# Patient Record
Sex: MALE | Race: WHITE | NOT HISPANIC OR LATINO | Employment: FULL TIME | URBAN - METROPOLITAN AREA
[De-identification: names, ages, dates, MRNs, and addresses within clinical notes are randomized per-mention and may not be internally consistent; named-entity substitution may affect disease eponyms.]

---

## 2017-01-10 ENCOUNTER — OFFICE VISIT (OUTPATIENT)
Dept: URGENT CARE | Facility: CLINIC | Age: 52
End: 2017-01-10
Payer: COMMERCIAL

## 2017-01-10 VITALS
WEIGHT: 137.44 LBS | DIASTOLIC BLOOD PRESSURE: 67 MMHG | HEART RATE: 89 BPM | TEMPERATURE: 98 F | SYSTOLIC BLOOD PRESSURE: 104 MMHG | HEIGHT: 66 IN | BODY MASS INDEX: 22.09 KG/M2

## 2017-01-10 DIAGNOSIS — J06.9 UPPER RESPIRATORY TRACT INFECTION, UNSPECIFIED TYPE: Primary | ICD-10-CM

## 2017-01-10 PROCEDURE — 99999 PR PBB SHADOW E&M-EST. PATIENT-LVL III: CPT | Mod: PBBFAC,,, | Performed by: NURSE PRACTITIONER

## 2017-01-10 PROCEDURE — 96372 THER/PROPH/DIAG INJ SC/IM: CPT | Mod: S$GLB,,, | Performed by: NURSE PRACTITIONER

## 2017-01-10 PROCEDURE — 99213 OFFICE O/P EST LOW 20 MIN: CPT | Mod: 25,S$GLB,, | Performed by: NURSE PRACTITIONER

## 2017-01-10 PROCEDURE — 3074F SYST BP LT 130 MM HG: CPT | Mod: S$GLB,,, | Performed by: NURSE PRACTITIONER

## 2017-01-10 PROCEDURE — 3078F DIAST BP <80 MM HG: CPT | Mod: S$GLB,,, | Performed by: NURSE PRACTITIONER

## 2017-01-10 PROCEDURE — 1159F MED LIST DOCD IN RCRD: CPT | Mod: S$GLB,,, | Performed by: NURSE PRACTITIONER

## 2017-01-10 RX ORDER — HYDROCODONE BITARTRATE AND ACETAMINOPHEN 10; 325 MG/1; MG/1
1 TABLET ORAL
COMMUNITY
End: 2021-12-29

## 2017-01-10 RX ORDER — AZITHROMYCIN 250 MG/1
TABLET, FILM COATED ORAL
Qty: 6 TABLET | Refills: 0 | Status: SHIPPED | OUTPATIENT
Start: 2017-01-10 | End: 2017-01-15

## 2017-01-10 RX ORDER — DEXAMETHASONE SODIUM PHOSPHATE 4 MG/ML
8 INJECTION, SOLUTION INTRA-ARTICULAR; INTRALESIONAL; INTRAMUSCULAR; INTRAVENOUS; SOFT TISSUE ONCE
Status: COMPLETED | OUTPATIENT
Start: 2017-01-10 | End: 2017-01-10

## 2017-01-10 RX ADMIN — DEXAMETHASONE SODIUM PHOSPHATE 8 MG: 4 INJECTION, SOLUTION INTRA-ARTICULAR; INTRALESIONAL; INTRAMUSCULAR; INTRAVENOUS; SOFT TISSUE at 12:01

## 2017-01-10 NOTE — MR AVS SNAPSHOT
[facility]  [address] [facility] [address]  Phone: [phone]  Fax: [phone]                  Hector Rao   1/10/2017 12:00 PM   Office Visit    Description:  Male : 1965   Provider:  Larissa Pope NP   Department:  [facility]           Reason for Visit     URI           Diagnoses this Visit        Comments    Upper respiratory tract infection, unspecified type    -  Primary            To Do List           Goals (5 Years of Data)     None      Follow-Up and Disposition     Return if symptoms worsen or fail to improve.       These Medications        Disp Refills Start End    azithromycin (Z-ANETTE) 250 MG tablet 6 tablet 0 1/10/2017 1/15/2017    Take 2 tablets by mouth on day 1; Take 1 tablet by mouth on days 2-5    [place] [facility] [place] [address] Ph #: [phone]         [facility]     [facility] On Call Nurse [facility] Line -  Assistance  Registered nurses in the [facility] provide clinical advisement, health education, appointment booking, and other advisory services.  Call for this free service at [phone].             Medications           Message regarding Medications     Verify the changes and/or additions to your medication regime listed below are the same as discussed with your clinician today.  If any of these changes or additions are incorrect, please notify your healthcare provider.        START taking these NEW medications        Refills    azithromycin (Z-ANETTE) 250 MG tablet 0    Sig: Take 2 tablets by mouth on day 1; Take 1 tablet by mouth on days 2-5    Class: Normal      These medications were administered today        Dose Freq    dexamethasone injection 8 mg 8 mg Once    Sig: Inject 2 mLs (8 mg total) into the muscle once.    Class: Normal    Route: Intramuscular      STOP taking these medications     budesonide-formoterol 160-4.5 mcg (SYMBICORT) 160-4.5 mcg/actuation HFAA  "Inhale 2 puffs into the lungs every 12 (twelve) hours.    predniSONE (DELTASONE) 5 MG tablet Take 8 po q day x 3 days then 4 po q day x 3 days then 2 po q day x 3 days then 1 po q day x 3 days then stop.           Verify that the below list of medications is an accurate representation of the medications you are currently taking.  If none reported, the list may be blank. If incorrect, please contact your healthcare provider. Carry this list with you in case of emergency.           Current Medications     albuterol 90 mcg/actuation inhaler Inhale 2 puffs into the lungs every 6 (six) hours as needed for Wheezing.    benazepril (LOTENSIN) 10 MG tablet Take 1 tablet (10 mg total) by mouth once daily.    clonazePAM (KLONOPIN) 0.5 MG tablet TAKE 1 TABLET BY MOUTH THREE TIMES A DAY IF NEEDED ANXIETY **MAY CAUSE DROWSINESS**    hydrocodone-acetaminophen 10-325mg (NORCO)  mg Tab Take 1 tablet by mouth.    azithromycin (Z-ANETTE) 250 MG tablet Take 2 tablets by mouth on day 1; Take 1 tablet by mouth on days 2-5           Clinical Reference Information           Vital Signs - Last Recorded  Most recent update: 1/10/2017 12:12 PM by Bhavna Lynch LPN    BP Pulse Temp Ht Wt BMI    104/67 89 98.3 °F (36.8 °C) (Oral) 5' 6" (1.676 m) 62.4 kg (137 lb 7.3 oz) 22.19 kg/m2      Blood Pressure          Most Recent Value    BP  104/67      Allergies as of 1/10/2017     Codeine    Lexapro [Escitalopram Oxalate]      Immunizations Administered on Date of Encounter - 1/10/2017     None      Smoking Cessation     If you would like to quit smoking:   You may be eligible for free services if you are a [place] resident and started smoking cigarettes before September 1, 1988.  Call the Smoking Cessation Trust ([employer]) toll free at ([phone] or ([phone].   Call 4-800-QUIT-NOW if you do not meet the above criteria.            "

## 2017-01-10 NOTE — PROGRESS NOTES
"CC:   Chief Complaint   Patient presents with    URI     HPI: This is a new problem.   Hector Rao is a 51 y.o. male with a complaint of URI.  The current episode started in the past 4 days.   The problem has been gradually worsening.   Associated symptoms included fever, chills, sore throat, cough, dyspnea, wheezing, headache.    Pertinent negatives include night sweats, nasal congestion, rhinorrhea, facial pain, swollen glands, chest pain, hemoptysis   Treatments tried: mucinex, sudafed, tylenol has been used and this has provided no relief.     The current allergy list that we have since it was last reconciled is as follows:  Review of patient's allergies indicates:   Allergen Reactions    Codeine      Other reaction(s): Nausea    Lexapro [escitalopram oxalate]      Erectile dysfunction.     Outpatient Medications Prior to Visit   Medication Sig Dispense Refill    albuterol 90 mcg/actuation inhaler Inhale 2 puffs into the lungs every 6 (six) hours as needed for Wheezing. 3 Inhaler 3    benazepril (LOTENSIN) 10 MG tablet Take 1 tablet (10 mg total) by mouth once daily. 90 tablet 3    clonazePAM (KLONOPIN) 0.5 MG tablet TAKE 1 TABLET BY MOUTH THREE TIMES A DAY IF NEEDED ANXIETY **MAY CAUSE DROWSINESS** 270 tablet 1    budesonide-formoterol 160-4.5 mcg (SYMBICORT) 160-4.5 mcg/actuation HFAA Inhale 2 puffs into the lungs every 12 (twelve) hours. 3 Inhaler 3    predniSONE (DELTASONE) 5 MG tablet Take 8 po q day x 3 days then 4 po q day x 3 days then 2 po q day x 3 days then 1 po q day x 3 days then stop. 45 tablet 0     No facility-administered medications prior to visit.         Physical Exam     Visit Vitals    /67    Pulse 89    Temp 98.3 °F (36.8 °C) (Oral)    Ht 5' 6" (1.676 m)    Wt 62.4 kg (137 lb 7.3 oz)    BMI 22.19 kg/m2     Physical Exam   Constitutional: He is oriented to person, place, and time. Vital signs are normal. He appears well-developed and well-nourished.   HENT:   Head: " Normocephalic and atraumatic.   Right Ear: Tympanic membrane and ear canal normal.   Left Ear: Tympanic membrane and ear canal normal.   Nose: Mucosal edema and rhinorrhea present.   Mouth/Throat: Uvula is midline and mucous membranes are normal. Posterior oropharyngeal erythema present. No oropharyngeal exudate.   Eyes: EOM are normal. Pupils are equal, round, and reactive to light.   Neck: Neck supple.   Cardiovascular: Normal rate, regular rhythm and normal heart sounds.    Pulmonary/Chest: Effort normal. He has no decreased breath sounds. He has wheezes in the right middle field, the right lower field, the left middle field and the left lower field. He has no rhonchi. He has no rales.   Musculoskeletal: Normal range of motion.   Neurological: He is alert and oriented to person, place, and time.   Skin: Skin is warm and dry.   Psychiatric: He has a normal mood and affect. His behavior is normal. Judgment and thought content normal.   Nursing note and vitals reviewed.      Encounter Diagnosis   Name Primary?    Upper respiratory tract infection, unspecified type Yes       PLAN:    Hector was seen today for uri.    Diagnoses and all orders for this visit:    Upper respiratory tract infection, unspecified type  -     dexamethasone injection 8 mg; Inject 2 mLs (8 mg total) into the muscle once.  -     azithromycin (Z-ANETTE) 250 MG tablet; Take 2 tablets by mouth on day 1; Take 1 tablet by mouth on days 2-5      Medications Ordered This Encounter      azithromycin (Z-ANETTE) 250 MG tablet          Sig: Take 2 tablets by mouth on day 1; Take 1 tablet by mouth on days 2-5          Dispense:  6 tablet          Refill:  0      dexamethasone injection 8 mg  No orders of the defined types were placed in this encounter.    RTC if symptoms are worsening or changing significantly or if not improved by the end of therapy.

## 2017-01-30 DIAGNOSIS — I10 ESSENTIAL HYPERTENSION: ICD-10-CM

## 2017-01-30 RX ORDER — BENAZEPRIL HYDROCHLORIDE 10 MG/1
TABLET ORAL
Qty: 90 TABLET | Refills: 3 | Status: SHIPPED | OUTPATIENT
Start: 2017-01-30 | End: 2017-04-05 | Stop reason: SDUPTHER

## 2017-02-14 DIAGNOSIS — F41.9 ANXIETY: ICD-10-CM

## 2017-02-14 RX ORDER — CLONAZEPAM 0.5 MG/1
TABLET ORAL
Qty: 90 TABLET | Refills: 0 | Status: SHIPPED | OUTPATIENT
Start: 2017-02-14 | End: 2017-04-05 | Stop reason: SDUPTHER

## 2017-02-14 NOTE — TELEPHONE ENCOUNTER
I have refilled the patient's requested medication x 1 month.  However, the patient is due for a face to face visit for narcotic refill. Call the patient on the phone and book the patient with EITHER ME OR MELVIN LUIS NP for a visit.    PLEASE DOCUMENT THE FACT THAT YOU HAVE CONTACTED THE PATIENT IN THE CHART FOR FUTURE REFERENCE.    Health Maintenance Due   Topic Date Due    Hepatitis C Screening  1965    Pneumococcal PPSV23 (Medium Risk) (1) 07/05/1981    Influenza Vaccine  08/01/2016

## 2017-03-23 ENCOUNTER — PATIENT OUTREACH (OUTPATIENT)
Dept: ADMINISTRATIVE | Facility: HOSPITAL | Age: 52
End: 2017-03-23

## 2017-03-23 DIAGNOSIS — I10 ESSENTIAL HYPERTENSION: Primary | ICD-10-CM

## 2017-04-05 ENCOUNTER — OFFICE VISIT (OUTPATIENT)
Dept: FAMILY MEDICINE | Facility: CLINIC | Age: 52
End: 2017-04-05
Payer: COMMERCIAL

## 2017-04-05 VITALS
DIASTOLIC BLOOD PRESSURE: 85 MMHG | HEIGHT: 66 IN | HEART RATE: 83 BPM | WEIGHT: 139.31 LBS | TEMPERATURE: 98 F | BODY MASS INDEX: 22.39 KG/M2 | SYSTOLIC BLOOD PRESSURE: 137 MMHG

## 2017-04-05 DIAGNOSIS — I10 ESSENTIAL HYPERTENSION: Primary | ICD-10-CM

## 2017-04-05 DIAGNOSIS — Z11.59 NEED FOR HEPATITIS C SCREENING TEST: ICD-10-CM

## 2017-04-05 DIAGNOSIS — J45.20 INTERMITTENT ASTHMA, UNCOMPLICATED: ICD-10-CM

## 2017-04-05 DIAGNOSIS — Z72.0 NICOTINE ABUSE: ICD-10-CM

## 2017-04-05 DIAGNOSIS — S03.00XA TMJ (DISLOCATION OF TEMPOROMANDIBULAR JOINT), INITIAL ENCOUNTER: ICD-10-CM

## 2017-04-05 DIAGNOSIS — F41.9 ANXIETY: ICD-10-CM

## 2017-04-05 DIAGNOSIS — Z12.5 PROSTATE CANCER SCREENING: ICD-10-CM

## 2017-04-05 PROCEDURE — 3075F SYST BP GE 130 - 139MM HG: CPT | Mod: S$GLB,,, | Performed by: FAMILY MEDICINE

## 2017-04-05 PROCEDURE — 3079F DIAST BP 80-89 MM HG: CPT | Mod: S$GLB,,, | Performed by: FAMILY MEDICINE

## 2017-04-05 PROCEDURE — 90471 IMMUNIZATION ADMIN: CPT | Mod: S$GLB,,, | Performed by: FAMILY MEDICINE

## 2017-04-05 PROCEDURE — 99214 OFFICE O/P EST MOD 30 MIN: CPT | Mod: S$GLB,,, | Performed by: FAMILY MEDICINE

## 2017-04-05 PROCEDURE — 1160F RVW MEDS BY RX/DR IN RCRD: CPT | Mod: S$GLB,,, | Performed by: FAMILY MEDICINE

## 2017-04-05 PROCEDURE — 90732 PPSV23 VACC 2 YRS+ SUBQ/IM: CPT | Mod: S$GLB,,, | Performed by: FAMILY MEDICINE

## 2017-04-05 PROCEDURE — 99999 PR PBB SHADOW E&M-EST. PATIENT-LVL III: CPT | Mod: PBBFAC,,, | Performed by: FAMILY MEDICINE

## 2017-04-05 RX ORDER — ALBUTEROL SULFATE 90 UG/1
2 AEROSOL, METERED RESPIRATORY (INHALATION) EVERY 6 HOURS PRN
Qty: 3 INHALER | Refills: 3 | Status: SHIPPED | OUTPATIENT
Start: 2017-04-05 | End: 2018-07-10 | Stop reason: SDUPTHER

## 2017-04-05 RX ORDER — CLONAZEPAM 0.5 MG/1
TABLET ORAL
Qty: 90 TABLET | Refills: 5 | Status: SHIPPED | OUTPATIENT
Start: 2017-04-05 | End: 2017-11-25 | Stop reason: SDUPTHER

## 2017-04-05 RX ORDER — BENAZEPRIL HYDROCHLORIDE 10 MG/1
10 TABLET ORAL DAILY
Qty: 90 TABLET | Refills: 3 | Status: SHIPPED | OUTPATIENT
Start: 2017-04-05 | End: 2017-07-31 | Stop reason: SDUPTHER

## 2017-04-05 NOTE — PROGRESS NOTES
"Subjective:      Patient ID: Hector Rao is a 51 y.o. male.    Chief Complaint: Annual Exam    HPI       He has had an ENT evaluate him for his TMJ and he has had a dentist that has been doing various reconstruction on his teeth.  He has had some continued ear pain on the left and he has had chiropractic treatment.  He has a snapping of his left ear when he opens and closes his jaw.  He can feel it with his finger.  He states that the chiropractor has referred him to a TMJ specialist.  It is only on the left ear that this is occurring.       The patient presents with essential hypertension.  The patient is tolerating the medication well and is in excellent compliance.  The patient is experiencing no side effects.  Counseling was offered regarding low salt diets.  The patient has a reduced salt intake.  The patient denies chest pain, palpitations, shortness of breath, dyspnea on exertion, left or murmur neck pain, nausea, vomiting, diaphoresis, paroxysmal nocturnal dyspnea, and orthopnea.   /85 (BP Location: Right arm, Patient Position: Sitting, BP Method: Automatic)  Pulse 83  Temp 97.6 °F (36.4 °C) (Oral)   Ht 5' 6" (1.676 m)  Wt 63.2 kg (139 lb 5.3 oz)  BMI 22.49 kg/m2    He has nicotine abuse and he does not want to quit this at this time.      Has has asthma and he is on albuterol for this intermittetnly.      He has anxiety and he is on chronic klonopin for this at this time and it is helping him stay calm.  He has not had problems with the medicine.     He has chronic pain and he is in with chronic pain management.     Health Maintenance Due   Topic Date Due    Hepatitis C Screening  1965    Pneumococcal PPSV23 (Medium Risk) (1) 07/05/1981    Influenza Vaccine  08/01/2016       Past Medical History:  Past Medical History:   Diagnosis Date    Anxiety     Asthma, acute     Joint pain     TMJ (dislocation of temporomandibular joint)      Past Surgical History:   Procedure Laterality Date "    KNEE SURGERY      left side     Review of patient's allergies indicates:   Allergen Reactions    Codeine      Other reaction(s): Nausea    Lexapro [escitalopram oxalate]      Erectile dysfunction.     Current Outpatient Prescriptions on File Prior to Visit   Medication Sig Dispense Refill    albuterol 90 mcg/actuation inhaler Inhale 2 puffs into the lungs every 6 (six) hours as needed for Wheezing. 3 Inhaler 3    benazepril (LOTENSIN) 10 MG tablet TAKE 1 TABLET BY MOUTH EVERY DAY 90 tablet 3    clonazePAM (KLONOPIN) 0.5 MG tablet TAKE 1 TABLET BY MOUTH THREE TIMES A DAY IF NEEDED ANXIETY **MAY CAUSE DROWSINESS** 90 tablet 0    hydrocodone-acetaminophen 10-325mg (NORCO)  mg Tab Take 1 tablet by mouth.       No current facility-administered medications on file prior to visit.      Social History     Social History    Marital status:      Spouse name: Myrtle    Number of children: 2    Years of education: N/A     Occupational History    [employer]      [employer]     Social History Main Topics    Smoking status: Current Every Day Smoker     Packs/day: 2.00     Years: 36.00     Types: Cigarettes    Smokeless tobacco: Never Used    Alcohol use 1.2 oz/week     2 Cans of beer per week      Comment: He is a reformed alcoholic/ 2 beers per week    Drug use: Yes      Comment: He used to use marijuana.    Sexual activity: Yes     Partners: Female     Other Topics Concern    Not on file     Social History Narrative    No narrative on file     Family History   Problem Relation Age of Onset    Stroke Mother     Hypertension Mother     Stroke Father     Hypertension Father     Stroke       grandmother/grandfather             Review of Systems   Constitutional: Negative.  Negative for chills, diaphoresis and fever.   HENT: Positive for dental problem. Negative for congestion, hearing loss, mouth sores, postnasal drip and sore throat.    Eyes: Negative for pain  "and visual disturbance.   Respiratory: Negative for cough, chest tightness, shortness of breath and wheezing.    Cardiovascular: Negative for chest pain.   Gastrointestinal: Negative for abdominal pain, anal bleeding, blood in stool, constipation, diarrhea, nausea and vomiting.   Genitourinary: Negative for dysuria and hematuria.   Musculoskeletal: Negative for back pain, neck pain and neck stiffness.   Skin: Negative for rash.   Neurological: Negative for dizziness and weakness.       Objective:   /85 (BP Location: Right arm, Patient Position: Sitting, BP Method: Automatic)  Pulse 83  Temp 97.6 °F (36.4 °C) (Oral)   Ht 5' 6" (1.676 m)  Wt 63.2 kg (139 lb 5.3 oz)  BMI 22.49 kg/m2    Physical Exam   Constitutional: He is oriented to person, place, and time. He appears well-developed and well-nourished.   HENT:   Head: Normocephalic and atraumatic.       Right Ear: External ear normal.   Left Ear: External ear normal.   Nose: Nose normal.   Mouth/Throat: Oropharynx is clear and moist. No oropharyngeal exudate.   There is a clicking noted on the left TMJ.   Eyes: Conjunctivae and EOM are normal. Pupils are equal, round, and reactive to light. Right eye exhibits no discharge. Left eye exhibits no discharge. No scleral icterus.   Neck: Normal range of motion. Neck supple. No JVD present. No thyromegaly present.   Cardiovascular: Normal rate, regular rhythm, normal heart sounds and intact distal pulses.  Exam reveals no gallop and no friction rub.    No murmur heard.  Pulmonary/Chest: Effort normal and breath sounds normal. No respiratory distress. He has no wheezes. He has no rales. He exhibits no tenderness.   Abdominal: Soft. Bowel sounds are normal. He exhibits no distension and no mass. There is no tenderness. There is no rebound and no guarding.   Genitourinary:   Genitourinary Comments: The patient was offered to allow me to check the prostate today but the patient declined to have it examined.  "   Musculoskeletal: Normal range of motion. He exhibits no edema or tenderness.   Lymphadenopathy:     He has no cervical adenopathy.   Neurological: He is alert and oriented to person, place, and time. No cranial nerve deficit. Coordination normal.   Skin: Skin is warm and dry. He is not diaphoretic.   Psychiatric: He has a normal mood and affect.       Assessment:     1. Essential hypertension    2. Anxiety    3. Nicotine abuse    4. TMJ (dislocation of temporomandibular joint), initial encounter    5. Need for hepatitis C screening test    6. Prostate cancer screening    7. Intermittent asthma, uncomplicated        Plan:   Hector was seen today for annual exam.    Diagnoses and all orders for this visit:    Essential hypertension  -     benazepril (LOTENSIN) 10 MG tablet; Take 1 tablet (10 mg total) by mouth once daily.  -     Comprehensive metabolic panel; Future  -     Lipid panel; Future    Anxiety  -     clonazePAM (KLONOPIN) 0.5 MG tablet; TAKE 1 TABLET BY MOUTH THREE TIMES A DAY IF NEEDED ANXIETY **MAY CAUSE DROWSINESS**    Nicotine abuse  -     Ambulatory referral to Smoking Cessation Program    TMJ (dislocation of temporomandibular joint), initial encounter    Need for hepatitis C screening test  -     Hepatitis C antibody; Future    Prostate cancer screening  -     PSA, Screening; Future    Intermittent asthma, uncomplicated  -     albuterol 90 mcg/actuation inhaler; Inhale 2 puffs into the lungs every 6 (six) hours as needed for Wheezing.    Other orders  -     Pneumococcal Polysaccharide Vaccine (23 Valent) (SQ/IM)        He needs to see a TMJ specialist who he has already secured the name of and is planning on seeing.

## 2017-04-05 NOTE — MR AVS SNAPSHOT
[facility]  [address] [facility] [address]  Phone: [phone]  Fax: [phone]                  Hector TAYLOR Jalen   2017 10:00 AM   Office Visit    Description:  Male : 1965   Provider:  Jerome Arnold MD   Department:  [facility]           Reason for Visit     Annual Exam           Diagnoses this Visit        Comments    Essential hypertension    -  Primary     Anxiety         Nicotine abuse         TMJ (dislocation of temporomandibular joint), initial encounter         Need for hepatitis C screening test         Prostate cancer screening         Intermittent asthma, uncomplicated                To Do List           Future Appointments        Provider Department Dept Phone    2017 1:45 PM LABORATORY, [place] [facility] [phone]      Goals (5 Years of Data)     None       These Medications        Disp Refills Start End    clonazePAM (KLONOPIN) 0.5 MG tablet 90 tablet 5 2017     TAKE 1 TABLET BY MOUTH THREE TIMES A DAY IF NEEDED ANXIETY **MAY CAUSE DROWSINESS**    Pharmacy: [address] Ph #: [phone]       Notes to Pharmacy: Generic For:KLONOPIN 0.5 MG TABLET    benazepril (LOTENSIN) 10 MG tablet 90 tablet 3 2017     Take 1 tablet (10 mg total) by mouth once daily. - Oral    Pharmacy: [address] Ph #: [phone]       Notes to Pharmacy: Generic For:LOTENSIN 10 MG TABLET  2017 4:56:11 PM  N O T I C E    Last quantity doesn't match original quantity    albuterol 90 mcg/actuation inhaler 3 Inhaler 3 2017     Inhale 2 puffs into the lungs every 6 (six) hours as needed for Wheezing. - Inhalation    Pharmacy: [address] Ph #: [phone]         [facility]     [facility] Nurse Care Line - 24/7 Assistance  Unless otherwise directed by your provider,  "please contact [facility], our nurse care line that is available for 24/7 assistance.     Registered nurses in the [facility] provide: appointment scheduling, clinical advisement, health education, and other advisory services.  Call: [phone] (toll free)               Medications           Message regarding Medications     Verify the changes and/or additions to your medication regime listed below are the same as discussed with your clinician today.  If any of these changes or additions are incorrect, please notify your healthcare provider.        CHANGE how you are taking these medications     Start Taking Instead of    benazepril (LOTENSIN) 10 MG tablet benazepril (LOTENSIN) 10 MG tablet    Dosage:  Take 1 tablet (10 mg total) by mouth once daily. Dosage:  TAKE 1 TABLET BY MOUTH EVERY DAY    Reason for Change:  Reorder            Verify that the below list of medications is an accurate representation of the medications you are currently taking.  If none reported, the list may be blank. If incorrect, please contact your healthcare provider. Carry this list with you in case of emergency.           Current Medications     albuterol 90 mcg/actuation inhaler Inhale 2 puffs into the lungs every 6 (six) hours as needed for Wheezing.    benazepril (LOTENSIN) 10 MG tablet Take 1 tablet (10 mg total) by mouth once daily.    clonazePAM (KLONOPIN) 0.5 MG tablet TAKE 1 TABLET BY MOUTH THREE TIMES A DAY IF NEEDED ANXIETY **MAY CAUSE DROWSINESS**    hydrocodone-acetaminophen 10-325mg (NORCO)  mg Tab Take 1 tablet by mouth.           Clinical Reference Information           Your Vitals Were     BP Pulse Temp Height Weight BMI    137/85 (BP Location: Right arm, Patient Position: Sitting, BP Method: Automatic) 83 97.6 °F (36.4 °C) (Oral) 5' 6" (1.676 m) 63.2 kg (139 lb 5.3 oz) 22.49 kg/m2      Blood Pressure          Most Recent Value    BP  137/85      Allergies as of 4/5/2017     Codeine    Lexapro " [Escitalopram Oxalate]      Immunizations Administered on Date of Encounter - 4/5/2017     Name Date Dose VIS Date Route    Pneumococcal Polysaccharide - 23 Valent  Incomplete 0.5 mL 4/24/2015 Intramuscular      Orders Placed During Today's Visit      Normal Orders This Visit    Ambulatory referral to Smoking Cessation Program     Pneumococcal Polysaccharide Vaccine (23 Valent) (SQ/IM)     Future Labs/Procedures Expected by Expires    Comprehensive metabolic panel  4/5/2017 (Approximate) 4/5/2018    Hepatitis C antibody  4/5/2017 (Approximate) 4/5/2018    Lipid panel  4/5/2017 (Approximate) 4/5/2018    PSA, Screening  4/5/2017 4/6/2018      Smoking Cessation     If you would like to quit smoking:   You may be eligible for free services if you are a [place] resident and started smoking cigarettes before September 1, 1988.  Call the Smoking Cessation Trust ([facility]) toll free at ([phone]) [phone] or [phone]) [phone].   Call 1-800-QUIT-NOW if you do not meet the above criteria.   Contact us via email: [email or web address]   View our website for more information: [email or web address]        Language Assistance Services     ATTENTION: Language assistance services are available, free of charge. Please call [phone].      ATENCIÓN: Si habla español, tiene a diallo disposición servicios gratuitos de asistencia lingüística. Llame al [phone].     CHÚ Ý: N?u b?n nói Ti?ng Vi?t, có các d?ch v? h? tr? ngôn ng? mi?n phí dành cho b?n. G?i s? [phone].         [facility] complies with applicable Federal civil rights laws and does not discriminate on the basis of race, color, national origin, age, disability, or sex.

## 2017-04-07 ENCOUNTER — LAB VISIT (OUTPATIENT)
Dept: LAB | Facility: HOSPITAL | Age: 52
End: 2017-04-07
Attending: FAMILY MEDICINE
Payer: COMMERCIAL

## 2017-04-07 DIAGNOSIS — I10 ESSENTIAL HYPERTENSION: ICD-10-CM

## 2017-04-07 DIAGNOSIS — Z11.59 NEED FOR HEPATITIS C SCREENING TEST: ICD-10-CM

## 2017-04-07 DIAGNOSIS — Z12.5 PROSTATE CANCER SCREENING: ICD-10-CM

## 2017-04-07 LAB
ALBUMIN SERPL BCP-MCNC: 3.9 G/DL
ALP SERPL-CCNC: 54 U/L
ALT SERPL W/O P-5'-P-CCNC: 18 U/L
ANION GAP SERPL CALC-SCNC: 10 MMOL/L
AST SERPL-CCNC: 22 U/L
BILIRUB SERPL-MCNC: 0.4 MG/DL
BUN SERPL-MCNC: 19 MG/DL
CALCIUM SERPL-MCNC: 9.3 MG/DL
CHLORIDE SERPL-SCNC: 106 MMOL/L
CHOLEST/HDLC SERPL: 3.3 {RATIO}
CO2 SERPL-SCNC: 22 MMOL/L
COMPLEXED PSA SERPL-MCNC: 0.39 NG/ML
CREAT SERPL-MCNC: 1 MG/DL
EST. GFR  (AFRICAN AMERICAN): >60 ML/MIN/1.73 M^2
EST. GFR  (NON AFRICAN AMERICAN): >60 ML/MIN/1.73 M^2
GLUCOSE SERPL-MCNC: 84 MG/DL
HDL/CHOLESTEROL RATIO: 30.5 %
HDLC SERPL-MCNC: 154 MG/DL
HDLC SERPL-MCNC: 47 MG/DL
LDLC SERPL CALC-MCNC: 94.4 MG/DL
NONHDLC SERPL-MCNC: 107 MG/DL
POTASSIUM SERPL-SCNC: 4 MMOL/L
PROT SERPL-MCNC: 6.7 G/DL
SODIUM SERPL-SCNC: 138 MMOL/L
TRIGL SERPL-MCNC: 63 MG/DL

## 2017-04-07 PROCEDURE — 80053 COMPREHEN METABOLIC PANEL: CPT

## 2017-04-07 PROCEDURE — 84153 ASSAY OF PSA TOTAL: CPT

## 2017-04-07 PROCEDURE — 36415 COLL VENOUS BLD VENIPUNCTURE: CPT | Mod: PO

## 2017-04-07 PROCEDURE — 86803 HEPATITIS C AB TEST: CPT

## 2017-04-07 PROCEDURE — 80061 LIPID PANEL: CPT

## 2017-04-10 LAB — HCV AB SERPL QL IA: NEGATIVE

## 2017-04-10 NOTE — PROGRESS NOTES
The patient's lipid and liver are at a controlled target on the labs that I reviewed.   Repeat a LIPID AND LIVER PROFILE in 12 months.  Refill the lipid medications for a year for the patient.    The patient has a normal PSA so recheck that in 1 year.     The electrolytes are good.  Recheck those in 1 year.

## 2017-07-31 ENCOUNTER — OFFICE VISIT (OUTPATIENT)
Dept: FAMILY MEDICINE | Facility: CLINIC | Age: 52
End: 2017-07-31
Payer: COMMERCIAL

## 2017-07-31 VITALS
WEIGHT: 137.38 LBS | DIASTOLIC BLOOD PRESSURE: 64 MMHG | HEIGHT: 66 IN | SYSTOLIC BLOOD PRESSURE: 101 MMHG | HEART RATE: 75 BPM | BODY MASS INDEX: 22.08 KG/M2 | TEMPERATURE: 98 F

## 2017-07-31 DIAGNOSIS — Z72.0 NICOTINE ABUSE: ICD-10-CM

## 2017-07-31 DIAGNOSIS — E78.5 HYPERLIPIDEMIA, UNSPECIFIED HYPERLIPIDEMIA TYPE: ICD-10-CM

## 2017-07-31 DIAGNOSIS — I10 ESSENTIAL HYPERTENSION: ICD-10-CM

## 2017-07-31 DIAGNOSIS — G45.9 TRANSIENT CEREBRAL ISCHEMIA, UNSPECIFIED TYPE: Primary | ICD-10-CM

## 2017-07-31 PROCEDURE — 99214 OFFICE O/P EST MOD 30 MIN: CPT | Mod: S$GLB,,, | Performed by: FAMILY MEDICINE

## 2017-07-31 PROCEDURE — 99999 PR PBB SHADOW E&M-EST. PATIENT-LVL III: CPT | Mod: PBBFAC,,, | Performed by: FAMILY MEDICINE

## 2017-07-31 RX ORDER — IBUPROFEN 200 MG
1 TABLET ORAL
COMMUNITY
Start: 2017-07-23 | End: 2017-08-06

## 2017-07-31 RX ORDER — ATORVASTATIN CALCIUM 20 MG/1
20 TABLET, FILM COATED ORAL DAILY
Qty: 30 TABLET | Refills: 11 | Status: SHIPPED | OUTPATIENT
Start: 2017-07-31 | End: 2018-07-17 | Stop reason: SDUPTHER

## 2017-07-31 RX ORDER — ASPIRIN 81 MG/1
81 TABLET ORAL
COMMUNITY
Start: 2017-07-23 | End: 2020-10-30 | Stop reason: SDUPTHER

## 2017-07-31 RX ORDER — OXYMETAZOLINE HCL 0.05 %
2 SPRAY, NON-AEROSOL (ML) NASAL
COMMUNITY
End: 2018-12-17

## 2017-07-31 RX ORDER — ACETAMINOPHEN 325 MG/1
650 TABLET ORAL
COMMUNITY
Start: 2017-07-23 | End: 2017-08-02

## 2017-07-31 RX ORDER — MULTIVITAMIN
1 TABLET ORAL
COMMUNITY
End: 2018-04-09

## 2017-07-31 RX ORDER — BENAZEPRIL HYDROCHLORIDE 10 MG/1
10 TABLET ORAL DAILY
Qty: 90 TABLET | Refills: 3 | Status: SHIPPED | OUTPATIENT
Start: 2017-07-31 | End: 2018-08-22 | Stop reason: SDUPTHER

## 2017-07-31 RX ORDER — ATORVASTATIN CALCIUM 20 MG/1
20 TABLET, FILM COATED ORAL
COMMUNITY
Start: 2017-07-23 | End: 2017-07-31 | Stop reason: SDUPTHER

## 2017-07-31 NOTE — PROGRESS NOTES
Subjective:      Patient ID: Hector Rao is a 52 y.o. male.    Chief Complaint: Follow-up    HPI he is f/u on his TIA workup.  I have reviewed all of his labs and workup and it appears that they kept him on the statin, ACE inhibitor and the aspirin.    He has not had a recurrence of the symptoms at this time.  He had a GABBY and the ECHO portion was read today.    He has lost his job and he has had a continue problem with his left ear and the vertigo, etc that he has had from it and he has been seeing an ENT for this.     Facial asymmetry 07/23/2017   Last Assessment & Plan:     The patient was placed in observation for evaluation of possible TIA. By the time the patient was evaluated in the ER is reported neurologic deficits (specifically facial asymmetry reported by the wife) had resolved. The patient has baseline cervical spinal canal stenosis. An MRI of the cervical spine did not show anything acute, specifically no spinal cord compression, and nothing that warranted neurosurgical/or orthopedic spine consultation inpatient. His MRI of the cervical spine showed multilevel osteophytes and some congenital narrowing. Of note, his wife was stating that he had an episode of the left side of his face being abnormal in appearance. Possibly left-sided facial droop? The patient had a head CT done at [facility] emergency room that did not show anything acute. He was transferred here for observation status for stroke workup. He was placed on an aspirin and statin. Again, as mentioned above, by the time of evaluation in the ER he had not had any shoulder droop or asymmetry in the only neurologic deficit was his report of pain and some numbness down his left arm but no strength deficits upon exam. His neuro exam has remained stable/essentially normal (with the exception of the aforementioned chronic sensory complaints in the left arm). The patient had an MRI of his brain done that did not show any acute findings nor old  stroke. Carotid ultrasound showed minimal disease on the left at 20-39% stenosis and normal on the right at 0-19% stenosis. It is suspected the patient might have had a TIA and he is being treated as such given his risk factors of age, hypertension, and tobacco history. He has been counseled regarding stroke risk factors and counseled to quit smoking. TTE done and official/cardiology read pending. No hx of arrhythmia and patient has NSR on EKG and no reported events on tele. We will continue him on a statin and aspirin at discharge continue his ACE inhibitor for blood pressure control. Of note, his hemodynamics have been stable while inpatient. He is not a diabetic. He did receive Decadron in the ER when they were working up his cervical disc disease and that resulted in some transient hyperglycemia. Overall the patient's been requesting discharge stating he feels much better. Of counseled the patient regarding the fact that this could have been a TIA and that we are going to treat him as such and he voiced understanding. Overall the patient is stable for discharge.     Hypertension 07/23/2017   Last Assessment & Plan:     The patient was continued on his home medication of lisinopril given his neurologic deficits had resolved. He will be continued on his lisinopril at discharge. His hemodynamics have remained stable.     Tobacco abuse 07/23/2017   Last Assessment & Plan:     The patient's been counseled to quit and provided with a nicotine patch. He wants to self quit.     Cervical stenosis of spine 07/22/2017   Last Assessment & Plan:     The MRI of the patient's cervical spine showed chronic changes, specifically multilevel osteophytes with some mild congenital narrowing. No cord compression was present. The patient has had chronic neurologic complaints in his left arm in the form of sensory complaints and pain but no motor loss/abnormality. His complaints are not new. There is no indication for inpatient  neurosurgical nor orthopedic spinal consultation. I have informed the patient of these results and I have recommended that he follow-up with his PCP and get potentially referred to a neurosurgeon or spinal surgeon for continued monitoring.         There are no preventive care reminders to display for this patient.    Past Medical History:  Past Medical History:   Diagnosis Date    Anxiety     Asthma, acute     Joint pain     TIA (transient ischemic attack) 7/31/2017    TMJ (dislocation of temporomandibular joint)      Past Surgical History:   Procedure Laterality Date    KNEE SURGERY      left side     Review of patient's allergies indicates:   Allergen Reactions    Codeine      Other reaction(s): Nausea    Lexapro [escitalopram oxalate]      Erectile dysfunction.     Current Outpatient Prescriptions on File Prior to Visit   Medication Sig Dispense Refill    albuterol 90 mcg/actuation inhaler Inhale 2 puffs into the lungs every 6 (six) hours as needed for Wheezing. 3 Inhaler 3    benazepril (LOTENSIN) 10 MG tablet Take 1 tablet (10 mg total) by mouth once daily. 90 tablet 3    clonazePAM (KLONOPIN) 0.5 MG tablet TAKE 1 TABLET BY MOUTH THREE TIMES A DAY IF NEEDED ANXIETY **MAY CAUSE DROWSINESS** 90 tablet 5    hydrocodone-acetaminophen 10-325mg (NORCO)  mg Tab Take 1 tablet by mouth.       No current facility-administered medications on file prior to visit.      Social History     Social History    Marital status:      Spouse name: Myrtle    Number of children: 2    Years of education: N/A     Occupational History     [employer]      [employer]     Social History Main Topics    Smoking status: Current Every Day Smoker     Packs/day: 2.00     Years: 36.00     Types: Cigarettes    Smokeless tobacco: Never Used    Alcohol use 1.2 oz/week     2 Cans of beer per week      Comment: He is a reformed alcoholic/ 2 beers per week    Drug use:       Comment: He  "used to use marijuana.    Sexual activity: Yes     Partners: Female     Other Topics Concern    Not on file     Social History Narrative    No narrative on file     Family History   Problem Relation Age of Onset    Stroke Mother     Hypertension Mother     Stroke Father     Hypertension Father     Stroke       grandmother/grandfather           Review of Systems   Constitutional: Negative for fatigue.   Respiratory: Negative for cough and shortness of breath.    Cardiovascular: Negative for chest pain and palpitations.   Gastrointestinal: Negative for abdominal pain.   Genitourinary: Negative for dysuria and hematuria.       Objective:   /64   Pulse 75   Temp 98.3 °F (36.8 °C) (Oral)   Ht 5' 6" (1.676 m)   Wt 62.3 kg (137 lb 5.6 oz)   BMI 22.17 kg/m²     Physical Exam   Constitutional: He is oriented to person, place, and time. He appears well-developed and well-nourished.   HENT:   Head: Normocephalic and atraumatic.   Right Ear: External ear normal.   Left Ear: External ear normal.   Nose: Nose normal.   Mouth/Throat: Oropharynx is clear and moist. No oropharyngeal exudate.   Eyes: Conjunctivae and EOM are normal. Pupils are equal, round, and reactive to light. Right eye exhibits no discharge. Left eye exhibits no discharge. No scleral icterus.   Neck: Normal range of motion. Neck supple. No JVD present. No thyromegaly present.   Cardiovascular: Normal rate, regular rhythm, normal heart sounds and intact distal pulses.  Exam reveals no gallop and no friction rub.    No murmur heard.  Pulmonary/Chest: Effort normal and breath sounds normal. No respiratory distress. He has no wheezes. He has no rales. He exhibits no tenderness.   Abdominal: Soft. Bowel sounds are normal. He exhibits no distension and no mass. There is no tenderness. There is no rebound and no guarding.   Musculoskeletal: Normal range of motion. He exhibits no edema or tenderness.   Lymphadenopathy:     He has no cervical " adenopathy.   Neurological: He is alert and oriented to person, place, and time. No cranial nerve deficit. Coordination normal.   Skin: Skin is warm and dry. He is not diaphoretic.   Psychiatric: He has a normal mood and affect.       Assessment:     1. Transient cerebral ischemia, unspecified type    2. Essential hypertension    3. Nicotine abuse    4. Hyperlipidemia, unspecified hyperlipidemia type        Plan:   Hector was seen today for follow-up.    Diagnoses and all orders for this visit:    Transient cerebral ischemia, unspecified type  -     atorvastatin (LIPITOR) 20 MG tablet; Take 1 tablet (20 mg total) by mouth once daily.    Essential hypertension  -     benazepril (LOTENSIN) 10 MG tablet; Take 1 tablet (10 mg total) by mouth once daily.    Nicotine abuse    Hyperlipidemia, unspecified hyperlipidemia type  -     Hepatic function panel; Future  -     Lipid panel; Future

## 2017-10-31 ENCOUNTER — LAB VISIT (OUTPATIENT)
Dept: LAB | Facility: HOSPITAL | Age: 52
End: 2017-10-31
Attending: FAMILY MEDICINE
Payer: COMMERCIAL

## 2017-10-31 DIAGNOSIS — E78.5 HYPERLIPIDEMIA, UNSPECIFIED HYPERLIPIDEMIA TYPE: ICD-10-CM

## 2017-10-31 LAB
ALBUMIN SERPL BCP-MCNC: 3.7 G/DL
ALP SERPL-CCNC: 55 U/L
ALT SERPL W/O P-5'-P-CCNC: 25 U/L
AST SERPL-CCNC: 20 U/L
BILIRUB DIRECT SERPL-MCNC: 0.3 MG/DL
BILIRUB SERPL-MCNC: 0.6 MG/DL
CHOLEST SERPL-MCNC: 116 MG/DL
CHOLEST/HDLC SERPL: 2.5 {RATIO}
HDLC SERPL-MCNC: 46 MG/DL
HDLC SERPL: 39.7 %
LDLC SERPL CALC-MCNC: 58.4 MG/DL
NONHDLC SERPL-MCNC: 70 MG/DL
PROT SERPL-MCNC: 7.2 G/DL
TRIGL SERPL-MCNC: 58 MG/DL

## 2017-10-31 PROCEDURE — 36415 COLL VENOUS BLD VENIPUNCTURE: CPT | Mod: PO

## 2017-10-31 PROCEDURE — 80061 LIPID PANEL: CPT

## 2017-10-31 PROCEDURE — 80076 HEPATIC FUNCTION PANEL: CPT

## 2017-11-25 DIAGNOSIS — F41.9 ANXIETY: ICD-10-CM

## 2017-11-25 RX ORDER — CLONAZEPAM 0.5 MG/1
TABLET ORAL
Qty: 90 TABLET | Refills: 2 | Status: SHIPPED | OUTPATIENT
Start: 2017-11-25 | End: 2018-08-30 | Stop reason: SDUPTHER

## 2017-11-27 DIAGNOSIS — F41.9 ANXIETY: ICD-10-CM

## 2017-11-27 RX ORDER — CLONAZEPAM 0.5 MG/1
TABLET ORAL
Qty: 90 TABLET | Refills: 2 | OUTPATIENT
Start: 2017-11-27

## 2017-11-27 NOTE — TELEPHONE ENCOUNTER
I have refused a medicine for the patient.  I refilled his klonopin this weekend.    clonazePAM (KLONOPIN) 0.5 MG tablet 90 tablet 2 11/25/2017  No   Sig: TAKE 1 TABLET BY MOUTH THREE TIMES A DAY IF NEEDED ANXIETY **MAY CAUSE DROWSINESS**   Class: Normal   Notes to Pharmacy: Generic For:KLONOPIN 0.5 MG TABLET   Order: [id number]   Date/Time Signed: 11/25/2017 11:09       E-Prescribing Status: Receipt confirmed by pharmacy (11/25/2017 11:14 AM CST)

## 2017-11-28 ENCOUNTER — PATIENT MESSAGE (OUTPATIENT)
Dept: FAMILY MEDICINE | Facility: CLINIC | Age: 52
End: 2017-11-28

## 2018-04-09 ENCOUNTER — OFFICE VISIT (OUTPATIENT)
Dept: FAMILY MEDICINE | Facility: CLINIC | Age: 53
End: 2018-04-09
Payer: COMMERCIAL

## 2018-04-09 VITALS
BODY MASS INDEX: 23.84 KG/M2 | SYSTOLIC BLOOD PRESSURE: 118 MMHG | DIASTOLIC BLOOD PRESSURE: 71 MMHG | HEART RATE: 82 BPM | HEIGHT: 66 IN | WEIGHT: 148.38 LBS

## 2018-04-09 DIAGNOSIS — F17.210 CIGARETTE NICOTINE DEPENDENCE WITHOUT COMPLICATION: Primary | ICD-10-CM

## 2018-04-09 DIAGNOSIS — H69.92 DYSFUNCTION OF LEFT EUSTACHIAN TUBE: ICD-10-CM

## 2018-04-09 DIAGNOSIS — H81.02 MENIERE DISEASE, LEFT: ICD-10-CM

## 2018-04-09 PROCEDURE — 3074F SYST BP LT 130 MM HG: CPT | Mod: CPTII,S$GLB,, | Performed by: FAMILY MEDICINE

## 2018-04-09 PROCEDURE — 99214 OFFICE O/P EST MOD 30 MIN: CPT | Mod: S$GLB,,, | Performed by: FAMILY MEDICINE

## 2018-04-09 PROCEDURE — 99999 PR PBB SHADOW E&M-EST. PATIENT-LVL III: CPT | Mod: PBBFAC,,, | Performed by: FAMILY MEDICINE

## 2018-04-09 PROCEDURE — 3078F DIAST BP <80 MM HG: CPT | Mod: CPTII,S$GLB,, | Performed by: FAMILY MEDICINE

## 2018-04-09 RX ORDER — HYDROCODONE BITARTRATE AND ACETAMINOPHEN 10; 325 MG/1; MG/1
TABLET ORAL
COMMUNITY
End: 2018-04-09 | Stop reason: SDUPTHER

## 2018-04-09 RX ORDER — FEXOFENADINE HCL AND PSEUDOEPHEDRINE HCI 60; 120 MG/1; MG/1
1 TABLET, EXTENDED RELEASE ORAL 2 TIMES DAILY
Qty: 60 TABLET | Refills: 0 | Status: SHIPPED | OUTPATIENT
Start: 2018-04-09 | End: 2018-12-17

## 2018-04-09 RX ORDER — FLUTICASONE PROPIONATE 50 MCG
SPRAY, SUSPENSION (ML) NASAL
Qty: 16 G | Refills: 12 | Status: SHIPPED | OUTPATIENT
Start: 2018-04-09 | End: 2018-12-17

## 2018-04-09 RX ORDER — METHYLPREDNISOLONE 4 MG/1
TABLET ORAL
Qty: 21 TABLET | Refills: 0 | Status: SHIPPED | OUTPATIENT
Start: 2018-04-09 | End: 2018-12-17

## 2018-04-15 NOTE — PROGRESS NOTES
Subjective:      Patient ID: Hector Rao is a 52 y.o. male.    Chief Complaint: Nicotine Dependence and Tinnitus (left)    Faith has a very difficult situation that he is dealing with.  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  He was off for a while and got back on with [employer] and he had a desk job in [place] but this was a problem also because he had trouble even going up in elevators.  He has lost that job and is now working janitorial or maintenance work.  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  He has been to an ENT and has had a shot of steroid that helped on the first but did not help on the second.  He states that this is a disabling condition and he has been unsuccessful in getting assistance with the disability office.  The jobs that he could do have been low paying and he has mental anguish over the fact that he has worked all of his life and has come to this position.  He feel abandoned by the system which is not supporting him with this recent declination of his benefits that he feels he should have.  I reviewed all of the ENT workup and treatments done at [facility] with him today as I was able to get their notes.  He is smoking and he would like to quit at this time. We discussed the free clinic and he is interested.     Health Maintenance Due   Topic Date Due    Influenza Vaccine  08/01/2017       Past Medical History:  Past Medical History:   Diagnosis Date    Anxiety     Asthma, acute     Hyperlipidemia 7/31/2017    Joint pain     TIA (transient ischemic attack) 7/31/2017    TMJ (dislocation of temporomandibular joint)      Past Surgical History:   Procedure Laterality Date    KNEE SURGERY      left side     Review of  patient's allergies indicates:   Allergen Reactions    Codeine      Other reaction(s): Nausea    Lexapro [escitalopram oxalate]      Erectile dysfunction.     Current Outpatient Prescriptions on File Prior to Visit   Medication Sig Dispense Refill    albuterol 90 mcg/actuation inhaler Inhale 2 puffs into the lungs every 6 (six) hours as needed for Wheezing. 3 Inhaler 3    aspirin (ECOTRIN) 81 MG EC tablet Take 81 mg by mouth.      atorvastatin (LIPITOR) 20 MG tablet Take 1 tablet (20 mg total) by mouth once daily. 30 tablet 11    benazepril (LOTENSIN) 10 MG tablet Take 1 tablet (10 mg total) by mouth once daily. 90 tablet 3    clonazePAM (KLONOPIN) 0.5 MG tablet TAKE 1 TABLET BY MOUTH THREE TIMES A DAY IF NEEDED ANXIETY **MAY CAUSE DROWSINESS** 90 tablet 2    hydrocodone-acetaminophen 10-325mg (NORCO)  mg Tab Take 1 tablet by mouth.      oxymetazoline (AFRIN) 0.05 % nasal spray 2 sprays.       No current facility-administered medications on file prior to visit.      Social History     Social History    Marital status:      Spouse name: Myrtle    Number of children: 2    Years of education: N/A     Occupational History     [employer]      [employer]     Social History Main Topics    Smoking status: Current Every Day Smoker     Packs/day: 2.00     Years: 36.00     Types: Cigarettes    Smokeless tobacco: Never Used    Alcohol use 1.2 oz/week     2 Cans of beer per week      Comment: He is a reformed alcoholic/ 2 beers per week    Drug use: Yes      Comment: He used to use marijuana.    Sexual activity: Yes     Partners: Female     Other Topics Concern    Not on file     Social History Narrative    No narrative on file     Family History   Problem Relation Age of Onset    Stroke Mother     Hypertension Mother     Stroke Father     Hypertension Father     Stroke       grandmother/grandfather             Review of Systems   Constitutional: Negative for  "fever.   HENT: Positive for ear pain (this is at times. ), hearing loss, postnasal drip, rhinorrhea and tinnitus (this is left sided. ). Negative for ear discharge, sinus pain, sore throat and trouble swallowing.    Respiratory: Negative for cough, shortness of breath and wheezing.    Cardiovascular: Negative for chest pain and palpitations.   Gastrointestinal: Negative for abdominal pain.   Genitourinary: Negative for dysuria and hematuria.       Objective:   /71   Pulse 82   Ht 5' 6" (1.676 m)   Wt 67.3 kg (148 lb 6.4 oz)   BMI 23.95 kg/m²     Physical Exam   Constitutional: He is oriented to person, place, and time. He appears well-developed and well-nourished.   HENT:   Head: Normocephalic and atraumatic.   Right Ear: External ear normal.   Left Ear: External ear normal.   Nose: Nose normal.   Mouth/Throat: Oropharynx is clear and moist. No oropharyngeal exudate.   Eyes: Conjunctivae and EOM are normal. Pupils are equal, round, and reactive to light. Right eye exhibits no discharge. Left eye exhibits no discharge. No scleral icterus.   Neck: Normal range of motion. Neck supple. No JVD present. No thyromegaly present.   Cardiovascular: Normal rate, regular rhythm, normal heart sounds and intact distal pulses.  Exam reveals no gallop and no friction rub.    No murmur heard.  Pulmonary/Chest: Effort normal and breath sounds normal. No respiratory distress. He has no wheezes. He has no rales. He exhibits no tenderness.   Abdominal: Soft. Bowel sounds are normal. He exhibits no distension and no mass. There is no tenderness. There is no rebound and no guarding.   Musculoskeletal: Normal range of motion. He exhibits no edema or tenderness.   Lymphadenopathy:     He has no cervical adenopathy.   Neurological: He is alert and oriented to person, place, and time. No cranial nerve deficit. Coordination normal.   Skin: Skin is warm and dry. He is not diaphoretic.   Psychiatric: He has a normal mood and affect. "       Assessment:     1. Cigarette nicotine dependence without complication    2. Dysfunction of left eustachian tube    3. Meniere disease, left        Plan:   Hector was seen today for nicotine dependence and tinnitus.    Diagnoses and all orders for this visit:    Cigarette nicotine dependence without complication  -     Ambulatory referral to Smoking Cessation Program    Dysfunction of left eustachian tube  -     methylPREDNISolone (MEDROL DOSEPACK) 4 mg tablet; follow package directions  -     fexofenadine-pseudoephedrine  mg (ALLEGRA-D)  mg per tablet; Take 1 tablet by mouth 2 (two) times daily.  -     fluticasone (FLONASE) 50 mcg/actuation nasal spray; Use 2 puffs in each nostril q day.    Meniere disease, left      I feel that he should see his ENT again at this time to see if other options are available.  This has obviously been debilitating to him over time and he has had workups and treatments with the specialists on this issue.

## 2018-04-17 ENCOUNTER — CLINICAL SUPPORT (OUTPATIENT)
Dept: SMOKING CESSATION | Facility: CLINIC | Age: 53
End: 2018-04-17
Payer: COMMERCIAL

## 2018-04-17 DIAGNOSIS — F17.210 HEAVY CIGARETTE SMOKER (20-39 PER DAY): Primary | ICD-10-CM

## 2018-04-17 PROCEDURE — 99404 PREV MED CNSL INDIV APPRX 60: CPT | Mod: S$GLB,,,

## 2018-04-17 PROCEDURE — 99999 PR PBB SHADOW E&M-EST. PATIENT-LVL I: CPT | Mod: PBBFAC,,,

## 2018-04-17 RX ORDER — BUPROPION HYDROCHLORIDE 150 MG/1
150 TABLET, EXTENDED RELEASE ORAL 2 TIMES DAILY
Qty: 60 TABLET | Refills: 0 | Status: SHIPPED | OUTPATIENT
Start: 2018-04-17 | End: 2018-05-28 | Stop reason: SDUPTHER

## 2018-04-17 RX ORDER — IBUPROFEN 200 MG
1 TABLET ORAL DAILY
Qty: 14 PATCH | Refills: 0 | Status: SHIPPED | OUTPATIENT
Start: 2018-04-17 | End: 2018-04-24 | Stop reason: SDUPTHER

## 2018-04-23 ENCOUNTER — CLINICAL SUPPORT (OUTPATIENT)
Dept: SMOKING CESSATION | Facility: CLINIC | Age: 53
End: 2018-04-23
Payer: COMMERCIAL

## 2018-04-23 DIAGNOSIS — F17.210 HEAVY CIGARETTE SMOKER (20-39 PER DAY): ICD-10-CM

## 2018-04-23 DIAGNOSIS — F17.210 MODERATE SMOKER (20 OR LESS PER DAY): Primary | ICD-10-CM

## 2018-04-23 PROCEDURE — 99999 PR PBB SHADOW E&M-EST. PATIENT-LVL I: CPT | Mod: PBBFAC,,,

## 2018-04-23 PROCEDURE — 90853 GROUP PSYCHOTHERAPY: CPT | Mod: S$GLB,,,

## 2018-04-23 NOTE — Clinical Note
Patient reports decreasing cigarette smoking from 2 packs per day for 41 years to 1 pack per day. The patient remains on the prescribed tobacco cessation medication regimen of 150 mg Wellbutrin SR with a 21 mg nicotine patch QD without any negative side effects at this time.

## 2018-04-24 RX ORDER — IBUPROFEN 200 MG
1 TABLET ORAL DAILY
Qty: 28 PATCH | Refills: 0 | Status: SHIPPED | OUTPATIENT
Start: 2018-04-24 | End: 2018-05-28 | Stop reason: SDUPTHER

## 2018-04-24 NOTE — PROGRESS NOTES
Site: [place]  Date:  4/23/18  Clinical Status of Patient: Outpatient   Length of Service and Code: 90 minutes - 03055   Number in Attendance: 5  Group Activities/Focus of Group:  Orientation, client introductions, completion of TCRS (Tobacco Cessation Rating Scale) learned addiction model, cues/triggers, personal reasons for quitting, medications, goals and  quit date. completion of TCRS (Tobacco Cessation Rating Scale) reviewed strategies, cues, and triggers. Introduced the negative impact of tobacco on health, the health advantages of discontinuing the use of tobacco, time line improved health changes after a quit, withdrawal issues to expect from nicotine and habit, and ways to achieve the goal of a quit.    Target symptoms:  withdrawal and medication side effects             The following were rated moderate (3) to severe (4) on TCRS:       Moderate 3: none     Severe 4:   none  Patient's Response to Intervention: Patient reports decreasing cigarette smoking from 2 packs per day for 41 years to 1 pack per day. The patient remains on the prescribed tobacco cessation medication regimen of 150 mg Wellbutrin SR with a 21 mg nicotine patch QD without any negative side effects at this time.     Progress Toward Goals and Other Mental Status Changes: The patient denies any abnormal behavioral or mental changes at this time.     Plan: The patient will continue with group therapy sessions and medication regimen prescribed with management by physician or Cessation Clinic Provider. Patient will inform Smoking Clinic Cessation Counselor of symptoms as rated high on TCRS.  The patient will continue with group therapy sessions and medication monitoring by CTTS. Prescribed medication management will be by physician.     Return to Clinic: 1 week

## 2018-04-30 ENCOUNTER — CLINICAL SUPPORT (OUTPATIENT)
Dept: SMOKING CESSATION | Facility: CLINIC | Age: 53
End: 2018-04-30
Payer: COMMERCIAL

## 2018-04-30 DIAGNOSIS — F17.210 MODERATE SMOKER (20 OR LESS PER DAY): Primary | ICD-10-CM

## 2018-04-30 PROCEDURE — 99999 PR PBB SHADOW E&M-EST. PATIENT-LVL I: CPT | Mod: PBBFAC,,,

## 2018-04-30 PROCEDURE — 90853 GROUP PSYCHOTHERAPY: CPT | Mod: S$GLB,,,

## 2018-04-30 NOTE — Clinical Note
Patient reports smoking less than a pack of cigarettes per day after smoking 2 packs per day for 41 years. He has romana a quit date of 5/15/18. The patient remains on the prescribed tobacco cessation medication regimen of 150 mg Wellbutrin SR BID with a 21 mg nicotine patch QD without any negative side effects at this time.

## 2018-05-07 ENCOUNTER — CLINICAL SUPPORT (OUTPATIENT)
Dept: SMOKING CESSATION | Facility: CLINIC | Age: 53
End: 2018-05-07
Payer: COMMERCIAL

## 2018-05-07 DIAGNOSIS — F17.210 MODERATE SMOKER (20 OR LESS PER DAY): Primary | ICD-10-CM

## 2018-05-07 PROCEDURE — 90853 GROUP PSYCHOTHERAPY: CPT | Mod: S$GLB,,,

## 2018-05-07 PROCEDURE — 99999 PR PBB SHADOW E&M-EST. PATIENT-LVL I: CPT | Mod: PBBFAC,,,

## 2018-05-07 NOTE — Clinical Note
Patient reports decreasing cigarette smoking from 2 packs per day for 41 years to a 1/2 pack per day. He is determined to stop smoking and has set a quit date of 5/30/18. The patient remains on the prescribed tobacco cessation medication regimen of 150 mg Wellbutrin BID with a 21 mg nicotine patch QD without any negative side effects at this time.

## 2018-05-08 NOTE — PROGRESS NOTES
Smoking Cessation Group Session #5    Site: [facility]  Date:  5/7/18  Clinical Status of Patient: Outpatient   Length of Service and Code: 90 minutes - 26324   Number in Attendance: 4  Group Activities/Focus of Group:  completion of TCRS (Tobacco Cessation Rating Scale) reviewed strategies, habitual behavior, high risks situations, understanding urges and cravings, stress and relaxation with open discussion and additional interventions, Introduced lapses, relapses, understanding them and analyzing the situation of a lapse, conflict issues that may be linked to a lapse.     Target symptoms:  withdrawal and medication side effects             The following were rated moderate (3) to severe (4) on TCRS:       Moderate 3: none     Severe 4:   none  Patient's Response to Intervention: Patient reports decreasing cigarette smoking from 2 packs per day for 41 years to a 1/2 pack per day. He is determined to stop smoking and has set a quit date of 5/30/18. The patient remains on the prescribed tobacco cessation medication regimen of 150 mg Wellbutrin BID with a 21 mg nicotine patch QD without any negative side effects at this time.     Progress Toward Goals and Other Mental Status Changes: The patient denies any abnormal behavioral or mental changes at this time.     Plan: The patient will continue with group therapy sessions and medication regimen prescribed with management by physician or by the Cessation Clinic Provider. Patient will inform Smoking Cessation Counselor of symptoms as rated high on TCRS.  The patient will continue with group therapy sessions and medication monitoring by CTTS. Prescribed medication management will be by physician.     Return to Clinic: 1 week    Quit Date: none   Planned Quit Date: 5/30/18

## 2018-05-14 ENCOUNTER — CLINICAL SUPPORT (OUTPATIENT)
Dept: SMOKING CESSATION | Facility: CLINIC | Age: 53
End: 2018-05-14
Payer: COMMERCIAL

## 2018-05-14 DIAGNOSIS — F17.210 LIGHT CIGARETTE SMOKER (1-9 CIGS/DAY): Primary | ICD-10-CM

## 2018-05-14 PROCEDURE — 99999 PR PBB SHADOW E&M-EST. PATIENT-LVL I: CPT | Mod: PBBFAC,,,

## 2018-05-14 PROCEDURE — 90853 GROUP PSYCHOTHERAPY: CPT | Mod: S$GLB,,,

## 2018-05-14 NOTE — Clinical Note
Patient reports decreasing cigarette smoking from 2 packs per day for 41 years to less than a pack per day. He is pleased with his progress and has set a quit date of 5/30/18. The patient remains on the prescribed tobacco cessation medication regimen of 21 mg nicotine patch QD without any negative side effects at this time. He is not taking Chantix at this time. Patient believes Chantix may make the ringing in his ears worse.

## 2018-05-15 NOTE — PROGRESS NOTES
Smoking Cessation Group Session #2    Site: [facility]  Date:  5/14/18  Clinical Status of Patient: Outpatient   Length of Service and Code: 90 minutes - 60619   Number in Attendance: 4  Group Activities/Focus of Group: completion of TCRS (Tobacco Cessation Rating Scale) reviewed strategies, cues, and triggers. Introduced the negative impact of tobacco on health, the health advantages of discontinuing the use of tobacco, time line improved health changes after a quit, withdrawal issues to expect from nicotine and habit, and ways to achieve the goal of a quit.    Target symptoms:  withdrawal and medication side effects             The following were rated moderate (3) to severe (4) on TCRS:       Moderate 3: none     Severe 4:   none  Patient's Response to Intervention: Patient reports decreasing cigarette smoking from 2 packs per day for 41 years to less than a pack per day. He is pleased with his progress and has set a quit date of 5/30/18. The patient remains on the prescribed tobacco cessation medication regimen of 21 mg nicotine patch QD without any negative side effects at this time. He is not taking Chantix at this time. Patient believes Chantix may make the ringing in his ears worse.    Progress Toward Goals and Other Mental Status Changes: The patient denies any abnormal behavioral or mental changes at this time.     Plan: The patient will continue with group therapy sessions and medication regimen prescribed with management by physician or by the Cessation Clinic Provider. Patient will inform Smoking Cessation Counselor of symptoms as rated high on TCRS. The patient will continue with group therapy sessions and medication monitoring by CTTS. Prescribed medication management will be by physician.     Return to Clinic: 1 week    Quit Date: none   Planned Quit Date: 5/30/18

## 2018-05-21 ENCOUNTER — CLINICAL SUPPORT (OUTPATIENT)
Dept: SMOKING CESSATION | Facility: CLINIC | Age: 53
End: 2018-05-21
Payer: COMMERCIAL

## 2018-05-21 DIAGNOSIS — F17.210 LIGHT CIGARETTE SMOKER (1-9 CIGS/DAY): Primary | ICD-10-CM

## 2018-05-21 PROCEDURE — 99999 PR PBB SHADOW E&M-EST. PATIENT-LVL I: CPT | Mod: PBBFAC,,,

## 2018-05-21 PROCEDURE — 90853 GROUP PSYCHOTHERAPY: CPT | Mod: S$GLB,,,

## 2018-05-21 NOTE — Clinical Note
The patient will continue with group therapy sessions and medication regimen prescribed with management by physician or by the Cessation Clinic Provider. Patient will inform Smoking Cessation Counselor of symptoms as rated high on TCRS. The patient will continue with group therapy sessions and medication monitoring by CTTS. Prescribed medication management will be by physician.

## 2018-05-22 NOTE — PROGRESS NOTES
Smoking Cessation Group Session #3    Site: [facility]  Date:  5/21/18  Clinical Status of Patient: Outpatient   Length of Service and Code: 90 minutes - 65003   Number in Attendance: 4  Group Activities/Focus of Group: completion of TCRS (Tobacco Cessation Rating Scale) reviewed strategies, controlling environment, cues, triggers, new goals set. Introduced high risk situations with preparation interventions, caffeine similarities with withdrawal issues of habit and nicotine, Alcohol, Understanding urges, cravings, stress and relaxation. Open discussion with intervention discussion.     Target symptoms:  withdrawal and medication side effects             The following were rated moderate (3) to severe (4) on TCRS:       Moderate 3: none     Severe 4:   none  Patient's Response to Intervention: Patient reports decreasing cigarette smoking from 2 packs per day for 41 years to a 1/2 pack of cigarettes. He is pleased with his progress and has set a quit date of 7/5/18. The patient remains on the prescribed tobacco cessation medication regimen of 21 mg nicotine patch QD without any negative side effects at this time. He is no longer taking Wellbutrin SR BID.    Progress Toward Goals and Other Mental Status Changes: The patient denies any abnormal behavioral or mental changes at this time.     Plan: The patient will continue with group therapy sessions and medication regimen prescribed with management by physician or by the Cessation Clinic Provider. Patient will inform Smoking Cessation Counselor of symptoms as rated high on TCRS. The patient will continue with group therapy sessions and medication monitoring by CTTS. Prescribed medication management will be by physician.     Return to Clinic: 1 week    Quit Date: none   Planned Quit Date: 7/5/18

## 2018-05-28 ENCOUNTER — CLINICAL SUPPORT (OUTPATIENT)
Dept: SMOKING CESSATION | Facility: CLINIC | Age: 53
End: 2018-05-28
Payer: COMMERCIAL

## 2018-05-28 DIAGNOSIS — F17.210 HEAVY CIGARETTE SMOKER (20-39 PER DAY): ICD-10-CM

## 2018-05-28 DIAGNOSIS — F17.210 MODERATE SMOKER (20 OR LESS PER DAY): ICD-10-CM

## 2018-05-28 DIAGNOSIS — F17.210 LIGHT CIGARETTE SMOKER (1-9 CIGS/DAY): Primary | ICD-10-CM

## 2018-05-28 PROCEDURE — 90853 GROUP PSYCHOTHERAPY: CPT | Mod: S$GLB,,,

## 2018-05-28 PROCEDURE — 99999 PR PBB SHADOW E&M-EST. PATIENT-LVL I: CPT | Mod: PBBFAC,,,

## 2018-05-28 RX ORDER — IBUPROFEN 200 MG
1 TABLET ORAL DAILY
Qty: 28 PATCH | Refills: 0 | Status: SHIPPED | OUTPATIENT
Start: 2018-05-28 | End: 2018-12-17

## 2018-05-28 RX ORDER — BUPROPION HYDROCHLORIDE 150 MG/1
150 TABLET, EXTENDED RELEASE ORAL 2 TIMES DAILY
Qty: 60 TABLET | Refills: 0 | Status: SHIPPED | OUTPATIENT
Start: 2018-05-28 | End: 2018-12-17

## 2018-05-28 NOTE — Clinical Note
Patient reports decreasing cigarette smoking from 2 packs per for 41 years to a 1/2 pack per day. He is pleased with his progress and intends to become tobacco free by 6/15/18. The patient remains on the prescribed tobacco cessation medication regimen of 150 mg  Wellbutrin SR BID with a 21 mg nicotine patch QD without any negative side effects at this time.

## 2018-05-29 NOTE — PROGRESS NOTES
Smoking Cessation Group Session #6    Site: [facility]  Date:  5/28/2018  Clinical Status of Patient: Outpatient   Length of Service and Code: 90 minutes - 42600   Number in Attendance: 2  Group Activities/Focus of Group: completion of TCRS (Tobacco Cessation Rating Scale) reviewed strategies, cues, triggers, high risk situations, lapses, relapses, diet, exercise, stress, relaxation, sleep, habitual behavior, and life style changes.    Target symptoms:  withdrawal and medication side effects             The following were rated moderate (3) to severe (4) on TCRS:       Moderate 3: none     Severe 4:   none  Patient's Response to Intervention: Patient reports decreasing cigarette smoking from 2 packs per for 41 years to a 1/2 pack per day. He is pleased with his progress and intends to become tobacco free by 6/15/18. The patient remains on the prescribed tobacco cessation medication regimen of 150 mg  Wellbutrin SR BID with a 21 mg nicotine patch QD without any negative side effects at this time.     Progress Toward Goals and Other Mental Status Changes: The patient denies any abnormal behavioral or mental changes at this time.       Plan: The patient will continue with group therapy sessions and medication regimen prescribed with management by physician or by the Cessation Clinic Provider. Patient will inform Smoking Cessation Counselor of symptoms as rated high on TCRS.  The patient will continue with group therapy sessions and medication monitoring by CTTS. Prescribed medication management will be by physician.     Return to Clinic: 1 week    Quit Date: none   Planned Quit Date: 6/15/18

## 2018-05-31 ENCOUNTER — TELEPHONE (OUTPATIENT)
Dept: SMOKING CESSATION | Facility: CLINIC | Age: 53
End: 2018-05-31

## 2018-05-31 NOTE — TELEPHONE ENCOUNTER
I returned patient's call in regard to medication. He stated in a voicemail to me he has not received the Wellbutrin that was ordered on 5/28/18. Once I was able to talk to him he had checked with the pharmacy and was told the RX is ready for . Patient has my contact information, Bibi Porter, [phone] for concerns or questions.

## 2018-06-26 ENCOUNTER — TELEPHONE (OUTPATIENT)
Dept: SMOKING CESSATION | Facility: CLINIC | Age: 53
End: 2018-06-26

## 2018-07-10 DIAGNOSIS — J45.20 INTERMITTENT ASTHMA, UNCOMPLICATED: ICD-10-CM

## 2018-07-10 DIAGNOSIS — J45.30 MILD PERSISTENT ASTHMA WITHOUT COMPLICATION: ICD-10-CM

## 2018-07-10 RX ORDER — ALBUTEROL SULFATE 90 UG/1
AEROSOL, METERED RESPIRATORY (INHALATION)
Qty: 54 G | Refills: 3 | Status: SHIPPED | OUTPATIENT
Start: 2018-07-10 | End: 2019-03-29

## 2018-07-10 RX ORDER — FLUTICASONE PROPIONATE AND SALMETEROL 50; 250 UG/1; UG/1
POWDER RESPIRATORY (INHALATION)
Qty: 60 EACH | Refills: 11 | Status: SHIPPED | OUTPATIENT
Start: 2018-07-10 | End: 2018-12-17

## 2018-07-11 ENCOUNTER — CLINICAL SUPPORT (OUTPATIENT)
Dept: SMOKING CESSATION | Facility: CLINIC | Age: 53
End: 2018-07-11
Payer: COMMERCIAL

## 2018-07-11 DIAGNOSIS — F17.200 NICOTINE DEPENDENCE: Primary | ICD-10-CM

## 2018-07-11 PROCEDURE — 99407 BEHAV CHNG SMOKING > 10 MIN: CPT | Mod: S$GLB,,,

## 2018-07-11 NOTE — PROGRESS NOTES
Successful contact with patient regarding tobacco cessation quit #1. Pt states, he is down to 8 cigarettes per day; from 2 packs per day and he feels he can continue on his own. Pt commended for the accomplishment thus far. Pt provided with his current benefit status and contact information to schedule an appointment if he needs support. Will follow up with his progress in 3 months.

## 2018-07-17 DIAGNOSIS — G45.9 TRANSIENT CEREBRAL ISCHEMIA, UNSPECIFIED TYPE: ICD-10-CM

## 2018-07-17 RX ORDER — ATORVASTATIN CALCIUM 20 MG/1
TABLET, FILM COATED ORAL
Qty: 30 TABLET | Refills: 11 | Status: SHIPPED | OUTPATIENT
Start: 2018-07-17 | End: 2019-06-27 | Stop reason: SDUPTHER

## 2018-08-22 DIAGNOSIS — I10 ESSENTIAL HYPERTENSION: ICD-10-CM

## 2018-08-22 RX ORDER — BENAZEPRIL HYDROCHLORIDE 10 MG/1
TABLET ORAL
Qty: 90 TABLET | Refills: 0 | Status: SHIPPED | OUTPATIENT
Start: 2018-08-22 | End: 2018-12-14 | Stop reason: SDUPTHER

## 2018-08-30 DIAGNOSIS — F41.9 ANXIETY: ICD-10-CM

## 2018-08-30 RX ORDER — CLONAZEPAM 0.5 MG/1
TABLET ORAL
Qty: 90 TABLET | Refills: 0 | Status: SHIPPED | OUTPATIENT
Start: 2018-08-30 | End: 2018-12-17 | Stop reason: SDUPTHER

## 2018-10-23 ENCOUNTER — TELEPHONE (OUTPATIENT)
Dept: SMOKING CESSATION | Facility: CLINIC | Age: 53
End: 2018-10-23

## 2018-11-02 ENCOUNTER — CLINICAL SUPPORT (OUTPATIENT)
Dept: SMOKING CESSATION | Facility: CLINIC | Age: 53
End: 2018-11-02
Payer: COMMERCIAL

## 2018-11-02 DIAGNOSIS — F17.200 NICOTINE DEPENDENCE: Primary | ICD-10-CM

## 2018-11-02 PROCEDURE — 99407 BEHAV CHNG SMOKING > 10 MIN: CPT | Mod: S$GLB,,,

## 2018-11-02 NOTE — PROGRESS NOTES
Successful contact with patient regarding tobacco cessation quit #1. Pt states, he has cut down tremendously, he currently smoke 5-6 cigarettes per day, and he is not ready to return to the program at this time. Pt informed of his benefit status, future telephone follow ups, and contact information to schedule an appointment if he need support. Will update the tobacco cessation smart form for 6 months on quit #1.

## 2018-11-15 DIAGNOSIS — I10 ESSENTIAL HYPERTENSION: ICD-10-CM

## 2018-11-16 RX ORDER — BENAZEPRIL HYDROCHLORIDE 10 MG/1
TABLET ORAL
Qty: 90 TABLET | Refills: 0 | OUTPATIENT
Start: 2018-11-16

## 2018-12-14 DIAGNOSIS — I10 ESSENTIAL HYPERTENSION: ICD-10-CM

## 2018-12-15 RX ORDER — BENAZEPRIL HYDROCHLORIDE 10 MG/1
TABLET ORAL
Qty: 90 TABLET | Refills: 0 | Status: SHIPPED | OUTPATIENT
Start: 2018-12-15 | End: 2018-12-17

## 2018-12-17 ENCOUNTER — HOSPITAL ENCOUNTER (OUTPATIENT)
Dept: RADIOLOGY | Facility: HOSPITAL | Age: 53
Discharge: HOME | End: 2018-12-17
Attending: FAMILY MEDICINE
Payer: COMMERCIAL

## 2018-12-17 ENCOUNTER — OFFICE VISIT (OUTPATIENT)
Dept: FAMILY MEDICINE | Facility: CLINIC | Age: 53
End: 2018-12-17
Payer: COMMERCIAL

## 2018-12-17 VITALS
OXYGEN SATURATION: 96 % | BODY MASS INDEX: 24.43 KG/M2 | SYSTOLIC BLOOD PRESSURE: 120 MMHG | WEIGHT: 152 LBS | HEIGHT: 66 IN | TEMPERATURE: 98 F | DIASTOLIC BLOOD PRESSURE: 60 MMHG

## 2018-12-17 DIAGNOSIS — J45.40 MODERATE PERSISTENT ASTHMA WITHOUT COMPLICATION: ICD-10-CM

## 2018-12-17 DIAGNOSIS — Z72.0 NICOTINE ABUSE: ICD-10-CM

## 2018-12-17 DIAGNOSIS — F41.9 ANXIETY: ICD-10-CM

## 2018-12-17 DIAGNOSIS — J45.40 MODERATE PERSISTENT ASTHMA WITHOUT COMPLICATION: Primary | ICD-10-CM

## 2018-12-17 DIAGNOSIS — I10 ESSENTIAL HYPERTENSION: ICD-10-CM

## 2018-12-17 PROCEDURE — 90686 IIV4 VACC NO PRSV 0.5 ML IM: CPT | Mod: S$GLB,,, | Performed by: FAMILY MEDICINE

## 2018-12-17 PROCEDURE — 99999 PR PBB SHADOW E&M-EST. PATIENT-LVL IV: CPT | Mod: PBBFAC,,, | Performed by: FAMILY MEDICINE

## 2018-12-17 PROCEDURE — 71046 X-RAY EXAM CHEST 2 VIEWS: CPT | Mod: TC,PO

## 2018-12-17 PROCEDURE — 3074F SYST BP LT 130 MM HG: CPT | Mod: CPTII,S$GLB,, | Performed by: FAMILY MEDICINE

## 2018-12-17 PROCEDURE — 3008F BODY MASS INDEX DOCD: CPT | Mod: CPTII,S$GLB,, | Performed by: FAMILY MEDICINE

## 2018-12-17 PROCEDURE — 99214 OFFICE O/P EST MOD 30 MIN: CPT | Mod: 25,S$GLB,, | Performed by: FAMILY MEDICINE

## 2018-12-17 PROCEDURE — 90471 IMMUNIZATION ADMIN: CPT | Mod: S$GLB,,, | Performed by: FAMILY MEDICINE

## 2018-12-17 PROCEDURE — 3078F DIAST BP <80 MM HG: CPT | Mod: CPTII,S$GLB,, | Performed by: FAMILY MEDICINE

## 2018-12-17 PROCEDURE — 71046 X-RAY EXAM CHEST 2 VIEWS: CPT | Mod: 26,,, | Performed by: RADIOLOGY

## 2018-12-17 RX ORDER — CLONAZEPAM 0.5 MG/1
0.5 TABLET ORAL NIGHTLY PRN
Qty: 90 TABLET | Refills: 0 | Status: SHIPPED | OUTPATIENT
Start: 2018-12-17 | End: 2019-03-29 | Stop reason: SDUPTHER

## 2018-12-17 RX ORDER — ALBUTEROL SULFATE 90 UG/1
2 AEROSOL, METERED RESPIRATORY (INHALATION) EVERY 6 HOURS PRN
COMMUNITY
End: 2019-09-23 | Stop reason: SDUPTHER

## 2018-12-17 RX ORDER — PREDNISONE 20 MG/1
40 TABLET ORAL DAILY
Qty: 10 TABLET | Refills: 0 | Status: SHIPPED | OUTPATIENT
Start: 2018-12-17 | End: 2018-12-22

## 2018-12-17 RX ORDER — BUDESONIDE AND FORMOTEROL FUMARATE DIHYDRATE 160; 4.5 UG/1; UG/1
2 AEROSOL RESPIRATORY (INHALATION) EVERY 12 HOURS
Qty: 1 INHALER | Refills: 5 | Status: SHIPPED | OUTPATIENT
Start: 2018-12-17 | End: 2019-05-10 | Stop reason: ALTCHOICE

## 2018-12-18 ENCOUNTER — TELEPHONE (OUTPATIENT)
Dept: FAMILY MEDICINE | Facility: CLINIC | Age: 53
End: 2018-12-18

## 2018-12-18 DIAGNOSIS — R91.8 ABNORMAL FINDINGS ON DIAGNOSTIC IMAGING OF LUNG: ICD-10-CM

## 2018-12-18 NOTE — PROGRESS NOTES
Complains of some dyspnea on exertion for months.  He did have recent pulmonary function testing consistent with obstruction.  He did have some evidence of reversibility.  He has chronic long-term smoking history.  He was on Advair previously, but states too expensive.  He does use albuterol intermittently.  He continues to smoke.  Hypertension blood pressure controlled.  Chronic anxiety uses clonazepam daily through Dr. Arnold.  He is on chronic opioid therapy through outside pain management physician.      Past Medical History:  Past Medical History:   Diagnosis Date    Anxiety     Asthma, acute     Hyperlipidemia 7/31/2017    Joint pain     TIA (transient ischemic attack) 7/31/2017    TMJ (dislocation of temporomandibular joint)      Past Surgical History:   Procedure Laterality Date    COLONOSCOPY N/A 3/17/2015    Performed by Jerome Arnold MD at [facility]    KNEE SURGERY      left side     Social History     Socioeconomic History    Marital status:      Spouse name: Myrtle    Number of children: 2    Years of education: Not on file    Highest education level: Not on file   Social Needs    Financial resource strain: Not on file    Food insecurity - worry: Not on file    Food insecurity - inability: Not on file    Transportation needs - medical: Not on file    Transportation needs - non-medical: Not on file   Occupational History    Occupation: Electronic Tech     Employer: [employer]     Employer:  [employer]   Tobacco Use    Smoking status: Current Every Day Smoker     Packs/day: 2.00     Years: 41.00     Pack years: 82.00     Types: Cigarettes    Smokeless tobacco: Never Used   Substance and Sexual Activity    Alcohol use: Yes     Alcohol/week: 1.2 oz     Types: 2 Cans of beer per week     Comment: He is a reformed alcoholic/ 2 beers per week    Drug use: Yes     Comment: He used to use marijuana.    Sexual activity: Yes     Partners: Female   Other Topics  Concern    Not on file   Social History Narrative    Not on file     Family History   Problem Relation Age of Onset    Stroke Mother     Hypertension Mother     Stroke Father     Hypertension Father     Stroke Unknown         grandmother/grandfather     Review of patient's allergies indicates:   Allergen Reactions    Codeine      Other reaction(s): Nausea    Lexapro [escitalopram oxalate]      Erectile dysfunction.     Current Outpatient Medications on File Prior to Visit   Medication Sig Dispense Refill    albuterol (PROVENTIL/VENTOLIN HFA) 90 mcg/actuation inhaler Inhale 2 puffs into the lungs every 6 (six) hours as needed. Rescue      atorvastatin (LIPITOR) 20 MG tablet TAKE 1 TABLET BY MOUTH EVERY DAY 30 tablet 11    hydrocodone-acetaminophen 10-325mg (NORCO)  mg Tab Take 1 tablet by mouth.      VENTOLIN HFA 90 mcg/actuation inhaler INHALE 2 PUFFS INTO THE LUNGS EVERY 6 HOURS AS NEEDED FOR WHEEZING. 54 g 3    [DISCONTINUED] benazepril (LOTENSIN) 10 MG tablet TAKE 1 TABLET BY MOUTH EVERY DAY 90 tablet 0    [DISCONTINUED] clonazePAM (KLONOPIN) 0.5 MG tablet TAKE 1 TABLET BY MOUTH THREE TIMES A DAY IF NEEDED ANXIETY **MAY CAUSE DROWSINESS** 90 tablet 0    aspirin (ECOTRIN) 81 MG EC tablet Take 81 mg by mouth.      [DISCONTINUED] ADVAIR DISKUS 250-50 mcg/dose diskus inhaler USE 1 INHALATION BY MOUTH TWICE DAILY, RINSE MOUTH AFTER EACH USE 60 each 11    [DISCONTINUED] buPROPion (WELLBUTRIN SR) 150 MG TBSR 12 hr tablet Take 1 tablet (150 mg total) by mouth 2 (two) times daily. 60 tablet 0    [DISCONTINUED] fexofenadine-pseudoephedrine  mg (ALLEGRA-D)  mg per tablet Take 1 tablet by mouth 2 (two) times daily. 60 tablet 0    [DISCONTINUED] fluticasone (FLONASE) 50 mcg/actuation nasal spray Use 2 puffs in each nostril q day. 16 g 12    [DISCONTINUED] methylPREDNISolone (MEDROL DOSEPACK) 4 mg tablet follow package directions 21 tablet 0    [DISCONTINUED] nicotine (NICODERM CQ) 21  "mg/24 hr Place 1 patch onto the skin once daily. 28 patch 0    [DISCONTINUED] oxymetazoline (AFRIN) 0.05 % nasal spray 2 sprays.       No current facility-administered medications on file prior to visit.            ROS:  GENERAL: No fever, chills,  or significant weight changes.   CARDIOVASCULAR: Denies chest pain, PND.  ABDOMEN: Appetite fine. Denies diarrhea, abdominal pain, hematemesis or blood in stool.  URINARY: No flank pain, dysuria or hematuria.    OBJECTIVE:     Vitals:    12/17/18 1320   BP: 120/60   Temp: 98.1 °F (36.7 °C)   SpO2: 96%   Weight: 68.9 kg (152 lb)   Height: 5' 6" (1.676 m)     Wt Readings from Last 3 Encounters:   12/17/18 68.9 kg (152 lb)   04/09/18 67.3 kg (148 lb 6.4 oz)   07/31/17 62.3 kg (137 lb 5.6 oz)     APPEARANCE: Well nourished, well developed, in no acute distress.    HEAD: Normocephalic.  Atraumatic.  No sinus tenderness.  EYES:   Right eye: Pupil reactive.  Conjunctiva clear.    Left eye: Pupil reactive.  Conjunctiva clear.    Both fundi:  Grossly normal to nondilated exam. EOMI.    EARS: TM's intact. Light reflex normal. No retraction or perforation.    NOSE:  clear.  MOUTH & THROAT:  No pharyngeal erythema or exudate. No lesions.  NECK: Supple. No bruits.  No JVD.  No cervical lymphadenopathy.  No thyromegaly.    CHEST:  He has some scattered wheezes and rhonchi.  Normal respiratory effort.  No rales .  CARDIOVASCULAR: Normal rate.  Regular rhythm.  No murmurs.  No rub.  No gallops.  ABDOMEN: Bowel sounds normal.  Soft.  No tenderness.  No organomegaly.  PERIPHERAL VASCULAR: No cyanosis.  No clubbing.  No edema.  NEUROLOGIC: No focal findings.  MENTAL STATUS: Alert.  Oriented x 3.            Hector was seen today for medication refill and shortness of breath.    Diagnoses and all orders for this visit:    Moderate persistent asthma without complication  -     Ambulatory referral to Pulmonology  -     X-Ray Chest PA And Lateral; Future    Anxiety  -     clonazePAM (KLONOPIN) " 0.5 MG tablet; Take 1 tablet (0.5 mg total) by mouth nightly as needed for Anxiety.    Nicotine abuse  -     Ambulatory referral to Pulmonology    Essential hypertension    Other orders  -     budesonide-formoterol 160-4.5 mcg (SYMBICORT) 160-4.5 mcg/actuation HFAA; Inhale 2 puffs into the lungs every 12 (twelve) hours.  -     predniSONE (DELTASONE) 20 MG tablet; Take 2 tablets (40 mg total) by mouth once daily. for 5 days  -     Influenza - Quadrivalent (3 years & older) (PF)      Discussed risk of overdose with benzodiazepine and hydrocodone.  He has been on this combination for quite a while without issue.  Encouraged him to quit smoking.  Will see if the Symbicort is cheaper.  Arrange pulmonary follow-up.  Follow-up Dr. Arnold 3 months.

## 2018-12-18 NOTE — TELEPHONE ENCOUNTER
----- Message from Kenny Mota MD sent at 12/17/2018  5:04 PM CST -----  The chest x-ray shows evidence of COPD and emphysema.  Possible abnormality towards the right of unclear cause.  Need further imaging for evaluation.  Schedule CT scan chest with contrast. My nurse will call you to arrange.  Thanks,  Dr. Mota

## 2018-12-19 ENCOUNTER — TELEPHONE (OUTPATIENT)
Dept: FAMILY MEDICINE | Facility: CLINIC | Age: 53
End: 2018-12-19

## 2018-12-19 NOTE — TELEPHONE ENCOUNTER
----- Message from Fidelina Bowden sent at 12/19/2018  2:08 PM CST -----  Pt at [phone]//states he is returning your call regarding his x-ray results//please call again//sherri/humberto

## 2018-12-23 ENCOUNTER — HOSPITAL ENCOUNTER (OUTPATIENT)
Dept: RADIOLOGY | Facility: HOSPITAL | Age: 53
Discharge: HOME | End: 2018-12-23
Attending: FAMILY MEDICINE
Payer: COMMERCIAL

## 2018-12-23 DIAGNOSIS — R91.8 ABNORMAL FINDINGS ON DIAGNOSTIC IMAGING OF LUNG: ICD-10-CM

## 2018-12-23 PROCEDURE — 71260 CT THORAX DX C+: CPT | Mod: TC

## 2018-12-23 PROCEDURE — 25500020 PHARM REV CODE 255: Performed by: FAMILY MEDICINE

## 2018-12-23 RX ADMIN — IOHEXOL 75 ML: 350 INJECTION, SOLUTION INTRAVENOUS at 09:12

## 2018-12-24 DIAGNOSIS — R93.89 ABNORMAL CT OF THE CHEST: Primary | ICD-10-CM

## 2018-12-24 NOTE — PROGRESS NOTES
I have reviewed this patient's recent labs and due to this, the following orders AND/OR meds have been sent in.  Please notify the patient so that the patient can be informed about my recommendations.      Orders Placed This Encounter   Procedures    CT Chest With Contrast     Standing Status:   Future     Standing Expiration Date:   12/24/2019     Order Specific Question:   Is the patient allergic to iodine or contrast?     Answer:   No     Order Specific Question:   Is the patient on ANY Metformin medication such as Glucophage/Glucovance ?     Answer:   No     Order Specific Question:   Does the patient have any of the following risk factors?     Answer:   None

## 2018-12-24 NOTE — PROGRESS NOTES
The CT of the chest shows what appears to be scarring of the lung.  Based on this, the radiologist recommends a 6 month recheck of the chest with a CT with contrast.    Orders Placed This Encounter  Procedures   CT Chest With Contrast    Standing Status:   Future    Standing Expiration Date:   12/24/2019    Order Specific Question:   Is the patient allergic to iodine or contrast?    Answer:   No    Order Specific Question:   Is the patient on ANY Metformin medication such as Glucophage/Glucovance ?    Answer:   No    Order Specific Question:   Does the patient have any of the following risk factors?    Answer:   None

## 2019-01-21 ENCOUNTER — OFFICE VISIT (OUTPATIENT)
Dept: FAMILY MEDICINE | Facility: CLINIC | Age: 54
End: 2019-01-21
Payer: COMMERCIAL

## 2019-01-21 VITALS
DIASTOLIC BLOOD PRESSURE: 84 MMHG | HEIGHT: 66 IN | WEIGHT: 150.38 LBS | TEMPERATURE: 99 F | BODY MASS INDEX: 24.17 KG/M2 | SYSTOLIC BLOOD PRESSURE: 136 MMHG | HEART RATE: 93 BPM

## 2019-01-21 DIAGNOSIS — J06.9 UPPER RESPIRATORY TRACT INFECTION, UNSPECIFIED TYPE: Primary | ICD-10-CM

## 2019-01-21 PROCEDURE — 3079F PR MOST RECENT DIASTOLIC BLOOD PRESSURE 80-89 MM HG: ICD-10-PCS | Mod: CPTII,S$GLB,, | Performed by: NURSE PRACTITIONER

## 2019-01-21 PROCEDURE — 3079F DIAST BP 80-89 MM HG: CPT | Mod: CPTII,S$GLB,, | Performed by: NURSE PRACTITIONER

## 2019-01-21 PROCEDURE — 3075F PR MOST RECENT SYSTOLIC BLOOD PRESS GE 130-139MM HG: ICD-10-PCS | Mod: CPTII,S$GLB,, | Performed by: NURSE PRACTITIONER

## 2019-01-21 PROCEDURE — 96372 THER/PROPH/DIAG INJ SC/IM: CPT | Mod: S$GLB,,, | Performed by: NURSE PRACTITIONER

## 2019-01-21 PROCEDURE — 96372 PR INJECTION,THERAP/PROPH/DIAG2ST, IM OR SUBCUT: ICD-10-PCS | Mod: S$GLB,,, | Performed by: NURSE PRACTITIONER

## 2019-01-21 PROCEDURE — 3008F PR BODY MASS INDEX (BMI) DOCUMENTED: ICD-10-PCS | Mod: CPTII,S$GLB,, | Performed by: NURSE PRACTITIONER

## 2019-01-21 PROCEDURE — 99213 PR OFFICE/OUTPT VISIT, EST, LEVL III, 20-29 MIN: ICD-10-PCS | Mod: 25,S$GLB,, | Performed by: NURSE PRACTITIONER

## 2019-01-21 PROCEDURE — 3075F SYST BP GE 130 - 139MM HG: CPT | Mod: CPTII,S$GLB,, | Performed by: NURSE PRACTITIONER

## 2019-01-21 PROCEDURE — 3008F BODY MASS INDEX DOCD: CPT | Mod: CPTII,S$GLB,, | Performed by: NURSE PRACTITIONER

## 2019-01-21 PROCEDURE — 99999 PR PBB SHADOW E&M-EST. PATIENT-LVL IV: CPT | Mod: PBBFAC,,, | Performed by: NURSE PRACTITIONER

## 2019-01-21 PROCEDURE — 99213 OFFICE O/P EST LOW 20 MIN: CPT | Mod: 25,S$GLB,, | Performed by: NURSE PRACTITIONER

## 2019-01-21 PROCEDURE — 99999 PR PBB SHADOW E&M-EST. PATIENT-LVL IV: ICD-10-PCS | Mod: PBBFAC,,, | Performed by: NURSE PRACTITIONER

## 2019-01-21 RX ORDER — METHYLPREDNISOLONE 4 MG/1
TABLET ORAL
Qty: 1 PACKAGE | Refills: 0 | Status: SHIPPED | OUTPATIENT
Start: 2019-01-21 | End: 2019-03-22

## 2019-01-21 RX ORDER — DEXAMETHASONE SODIUM PHOSPHATE 4 MG/ML
4 INJECTION, SOLUTION INTRA-ARTICULAR; INTRALESIONAL; INTRAMUSCULAR; INTRAVENOUS; SOFT TISSUE
Status: COMPLETED | OUTPATIENT
Start: 2019-01-21 | End: 2019-01-21

## 2019-01-21 RX ADMIN — DEXAMETHASONE SODIUM PHOSPHATE 4 MG: 4 INJECTION, SOLUTION INTRA-ARTICULAR; INTRALESIONAL; INTRAMUSCULAR; INTRAVENOUS; SOFT TISSUE at 03:01

## 2019-01-21 NOTE — PROGRESS NOTES
Subjective:       Patient ID: Hector Rao is a 53 y.o. male.    Chief Complaint: Fever and Chest Congestion    Sinus Problem   This is a new problem. The current episode started in the past 7 days. The problem has been gradually worsening since onset. There has been no fever. The pain is mild. Associated symptoms include congestion, coughing, headaches and sinus pressure. Pertinent negatives include no ear pain, shortness of breath or sore throat. Treatments tried: antihistamine, flonase. The treatment provided no relief.       Review of Systems   Constitutional: Negative for fatigue, fever and unexpected weight change.   HENT: Positive for congestion and sinus pressure. Negative for ear pain and sore throat.    Eyes: Negative.  Negative for pain and visual disturbance.   Respiratory: Positive for cough. Negative for shortness of breath.    Cardiovascular: Negative for chest pain and palpitations.   Gastrointestinal: Negative for abdominal pain, diarrhea, nausea and vomiting.   Genitourinary: Negative for dysuria and frequency.   Musculoskeletal: Negative for arthralgias and myalgias.   Skin: Negative for color change and rash.   Neurological: Positive for headaches. Negative for dizziness.   Psychiatric/Behavioral: Negative for sleep disturbance. The patient is not nervous/anxious.        Vitals:    01/21/19 1523   BP: 136/84   Pulse: 93   Temp: 98.7 °F (37.1 °C)       Objective:     Current Outpatient Medications   Medication Sig Dispense Refill    albuterol (PROVENTIL/VENTOLIN HFA) 90 mcg/actuation inhaler Inhale 2 puffs into the lungs every 6 (six) hours as needed. Rescue      aspirin (ECOTRIN) 81 MG EC tablet Take 81 mg by mouth.      atorvastatin (LIPITOR) 20 MG tablet TAKE 1 TABLET BY MOUTH EVERY DAY 30 tablet 11    budesonide-formoterol 160-4.5 mcg (SYMBICORT) 160-4.5 mcg/actuation HFAA Inhale 2 puffs into the lungs every 12 (twelve) hours. 1 Inhaler 5    clonazePAM (KLONOPIN) 0.5 MG tablet Take 1  tablet (0.5 mg total) by mouth nightly as needed for Anxiety. 90 tablet 0    hydrocodone-acetaminophen 10-325mg (NORCO)  mg Tab Take 1 tablet by mouth.      methylPREDNISolone (MEDROL DOSEPACK) 4 mg tablet use as directed 1 Package 0    VENTOLIN HFA 90 mcg/actuation inhaler INHALE 2 PUFFS INTO THE LUNGS EVERY 6 HOURS AS NEEDED FOR WHEEZING. 54 g 3     Current Facility-Administered Medications   Medication Dose Route Frequency Provider Last Rate Last Dose    dexamethasone injection 4 mg  4 mg Intramuscular 1 time in Clinic/HOD Juanjose Huynh NP           Physical Exam   Constitutional: He is oriented to person, place, and time. He appears well-developed. No distress.   HENT:   Head: Normocephalic and atraumatic.   Right Ear: Tympanic membrane normal.   Left Ear: Tympanic membrane is injected.   Nose: Mucosal edema present.   Mouth/Throat: Posterior oropharyngeal edema present.   Eyes: EOM are normal. Pupils are equal, round, and reactive to light.   Neck: Normal range of motion. Neck supple.   Cardiovascular: Normal rate and regular rhythm.   Pulmonary/Chest: Effort normal and breath sounds normal.   Musculoskeletal: Normal range of motion.   Neurological: He is alert and oriented to person, place, and time.   Skin: Skin is warm and dry. No rash noted.   Psychiatric: He has a normal mood and affect. Thought content normal.   Nursing note and vitals reviewed.      Assessment:       1. Upper respiratory tract infection, unspecified type        Plan:   Upper respiratory tract infection, unspecified type  -     dexamethasone injection 4 mg    Other orders  -     methylPREDNISolone (MEDROL DOSEPACK) 4 mg tablet; use as directed  Dispense: 1 Package; Refill: 0        Follow-up if symptoms worsen or fail to improve.    Patient Instructions   Start medrol edmar tomorrow am

## 2019-02-01 ENCOUNTER — PATIENT MESSAGE (OUTPATIENT)
Dept: FAMILY MEDICINE | Facility: CLINIC | Age: 54
End: 2019-02-01

## 2019-02-20 ENCOUNTER — CLINICAL SUPPORT (OUTPATIENT)
Dept: FAMILY MEDICINE | Facility: CLINIC | Age: 54
End: 2019-02-20

## 2019-02-20 DIAGNOSIS — Z00.00 ROUTINE GENERAL MEDICAL EXAMINATION AT A HEALTH CARE FACILITY: Primary | ICD-10-CM

## 2019-02-20 PROCEDURE — 99201 PR OFFICE/OUTPT VISIT,NEW,LEVL I: CPT | Mod: S$GLB,,, | Performed by: FAMILY MEDICINE

## 2019-02-20 PROCEDURE — 99201 PR OFFICE/OUTPT VISIT,NEW,LEVL I: ICD-10-PCS | Mod: S$GLB,,, | Performed by: FAMILY MEDICINE

## 2019-02-20 NOTE — PROGRESS NOTES
Hector has presented today for return to work screening on behalf of [facility] [employer] ( [email or web address]. Hector Rao has completed Non-DOT Physical.    Total: $55.00    Francine Brooke

## 2019-03-08 ENCOUNTER — TELEPHONE (OUTPATIENT)
Dept: FAMILY MEDICINE | Facility: CLINIC | Age: 54
End: 2019-03-08

## 2019-03-19 ENCOUNTER — PATIENT MESSAGE (OUTPATIENT)
Dept: FAMILY MEDICINE | Facility: CLINIC | Age: 54
End: 2019-03-19

## 2019-03-20 DIAGNOSIS — F41.9 ANXIETY: ICD-10-CM

## 2019-03-21 ENCOUNTER — PATIENT MESSAGE (OUTPATIENT)
Dept: FAMILY MEDICINE | Facility: CLINIC | Age: 54
End: 2019-03-21

## 2019-03-21 ENCOUNTER — TELEPHONE (OUTPATIENT)
Dept: FAMILY MEDICINE | Facility: CLINIC | Age: 54
End: 2019-03-21

## 2019-03-21 RX ORDER — CLONAZEPAM 0.5 MG/1
TABLET ORAL
Qty: 90 TABLET | Refills: 0 | OUTPATIENT
Start: 2019-03-21

## 2019-03-22 ENCOUNTER — LAB VISIT (OUTPATIENT)
Dept: LAB | Facility: HOSPITAL | Age: 54
End: 2019-03-22
Attending: INTERNAL MEDICINE
Payer: COMMERCIAL

## 2019-03-22 ENCOUNTER — OFFICE VISIT (OUTPATIENT)
Dept: PULMONOLOGY | Facility: CLINIC | Age: 54
End: 2019-03-22
Payer: COMMERCIAL

## 2019-03-22 VITALS
HEIGHT: 66 IN | HEART RATE: 80 BPM | OXYGEN SATURATION: 99 % | SYSTOLIC BLOOD PRESSURE: 140 MMHG | RESPIRATION RATE: 18 BRPM | WEIGHT: 147.69 LBS | DIASTOLIC BLOOD PRESSURE: 80 MMHG | BODY MASS INDEX: 23.74 KG/M2

## 2019-03-22 DIAGNOSIS — J45.21 MILD INTERMITTENT ASTHMA WITH ACUTE EXACERBATION: Chronic | ICD-10-CM

## 2019-03-22 DIAGNOSIS — J43.2 CENTRILOBULAR EMPHYSEMA: ICD-10-CM

## 2019-03-22 DIAGNOSIS — F17.210 NICOTINE DEPENDENCE, CIGARETTES, UNCOMPLICATED: Chronic | ICD-10-CM

## 2019-03-22 DIAGNOSIS — J45.21 MILD INTERMITTENT ASTHMA WITH ACUTE EXACERBATION: Primary | Chronic | ICD-10-CM

## 2019-03-22 LAB
CRP SERPL-MCNC: 0.7 MG/L
ERYTHROCYTE [SEDIMENTATION RATE] IN BLOOD BY WESTERGREN METHOD: 1 MM/HR

## 2019-03-22 PROCEDURE — 99205 PR OFFICE/OUTPT VISIT, NEW, LEVL V, 60-74 MIN: ICD-10-PCS | Mod: 25,S$GLB,, | Performed by: INTERNAL MEDICINE

## 2019-03-22 PROCEDURE — 86255 FLUORESCENT ANTIBODY SCREEN: CPT

## 2019-03-22 PROCEDURE — 3077F PR MOST RECENT SYSTOLIC BLOOD PRESSURE >= 140 MM HG: ICD-10-PCS | Mod: CPTII,S$GLB,, | Performed by: INTERNAL MEDICINE

## 2019-03-22 PROCEDURE — 99205 OFFICE O/P NEW HI 60 MIN: CPT | Mod: 25,S$GLB,, | Performed by: INTERNAL MEDICINE

## 2019-03-22 PROCEDURE — 96372 PR INJECTION,THERAP/PROPH/DIAG2ST, IM OR SUBCUT: ICD-10-PCS | Mod: S$GLB,,, | Performed by: INTERNAL MEDICINE

## 2019-03-22 PROCEDURE — 3079F PR MOST RECENT DIASTOLIC BLOOD PRESSURE 80-89 MM HG: ICD-10-PCS | Mod: CPTII,S$GLB,, | Performed by: INTERNAL MEDICINE

## 2019-03-22 PROCEDURE — 3079F DIAST BP 80-89 MM HG: CPT | Mod: CPTII,S$GLB,, | Performed by: INTERNAL MEDICINE

## 2019-03-22 PROCEDURE — 99999 PR PBB SHADOW E&M-EST. PATIENT-LVL III: ICD-10-PCS | Mod: PBBFAC,,, | Performed by: INTERNAL MEDICINE

## 2019-03-22 PROCEDURE — 3077F SYST BP >= 140 MM HG: CPT | Mod: CPTII,S$GLB,, | Performed by: INTERNAL MEDICINE

## 2019-03-22 PROCEDURE — 3008F PR BODY MASS INDEX (BMI) DOCUMENTED: ICD-10-PCS | Mod: CPTII,S$GLB,, | Performed by: INTERNAL MEDICINE

## 2019-03-22 PROCEDURE — 86038 ANTINUCLEAR ANTIBODIES: CPT

## 2019-03-22 PROCEDURE — 96372 THER/PROPH/DIAG INJ SC/IM: CPT | Mod: S$GLB,,, | Performed by: INTERNAL MEDICINE

## 2019-03-22 PROCEDURE — 94640 PR INHAL RX, AIRWAY OBST/DX SPUTUM INDUCT: ICD-10-PCS | Mod: 59,S$GLB,, | Performed by: INTERNAL MEDICINE

## 2019-03-22 PROCEDURE — 86140 C-REACTIVE PROTEIN: CPT

## 2019-03-22 PROCEDURE — 94640 AIRWAY INHALATION TREATMENT: CPT | Mod: 59,S$GLB,, | Performed by: INTERNAL MEDICINE

## 2019-03-22 PROCEDURE — 36415 COLL VENOUS BLD VENIPUNCTURE: CPT

## 2019-03-22 PROCEDURE — 99999 PR PBB SHADOW E&M-EST. PATIENT-LVL III: CPT | Mod: PBBFAC,,, | Performed by: INTERNAL MEDICINE

## 2019-03-22 PROCEDURE — 3008F BODY MASS INDEX DOCD: CPT | Mod: CPTII,S$GLB,, | Performed by: INTERNAL MEDICINE

## 2019-03-22 PROCEDURE — 85651 RBC SED RATE NONAUTOMATED: CPT

## 2019-03-22 RX ORDER — IPRATROPIUM BROMIDE AND ALBUTEROL SULFATE 2.5; .5 MG/3ML; MG/3ML
3 SOLUTION RESPIRATORY (INHALATION) EVERY 6 HOURS PRN
Qty: 120 VIAL | Refills: 5 | Status: SHIPPED | OUTPATIENT
Start: 2019-03-22 | End: 2020-09-23

## 2019-03-22 RX ORDER — PREDNISONE 10 MG/1
TABLET ORAL
Qty: 60 TABLET | Refills: 0 | Status: SHIPPED | OUTPATIENT
Start: 2019-03-22 | End: 2019-05-24 | Stop reason: SDUPTHER

## 2019-03-22 RX ORDER — IPRATROPIUM BROMIDE AND ALBUTEROL SULFATE 2.5; .5 MG/3ML; MG/3ML
3 SOLUTION RESPIRATORY (INHALATION)
Status: COMPLETED | OUTPATIENT
Start: 2019-03-22 | End: 2019-03-22

## 2019-03-22 RX ORDER — TRIAMCINOLONE ACETONIDE 40 MG/ML
80 INJECTION, SUSPENSION INTRA-ARTICULAR; INTRAMUSCULAR ONCE
Status: COMPLETED | OUTPATIENT
Start: 2019-03-22 | End: 2019-03-22

## 2019-03-22 RX ADMIN — TRIAMCINOLONE ACETONIDE 80 MG: 40 INJECTION, SUSPENSION INTRA-ARTICULAR; INTRAMUSCULAR at 09:03

## 2019-03-22 RX ADMIN — IPRATROPIUM BROMIDE AND ALBUTEROL SULFATE 3 ML: 2.5; .5 SOLUTION RESPIRATORY (INHALATION) at 09:03
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
## 2019-03-22 NOTE — PATIENT INSTRUCTIONS
Lung Anatomy  Your lungs take air in to give your body oxygen, which the body needs to work. Your lungs, like all the tissues in your body, are made up of billions of tiny specialized cells. Old lung cells die and are replaced by new, identical lung cells. This natural process helps ensure healthy lungs.    Date Last Reviewed: 11/1/2016  © 7558-8260 [employer]. [address] [place] [address], [place] [address]. All rights reserved. This information is not intended as a substitute for professional medical care. Always follow your healthcare professional's instructions.

## 2019-03-22 NOTE — ASSESSMENT & PLAN NOTE
1. Azithromycin 500mg PO daily X 5 days  2. Prednisone taper per orders  3. Kenalog 80mg IM X 1   4. Nebulizer for home use  5. DUONEB PER ORDERS    PULMONARY EVALUATION IN 1 MONTH:  [x]        Complete PFT with bronchodilator.  []        Bronchial challenge with methacholine.   [x]        Stress test, pulmonary.  [x]        PULM - Arterial Blood Gases  []        Chest X Ray  []        CT scan of chest.   [x]        CINDY  [x]        Sedimentation rate  [x]        C-reactive protein  [x]        Anti-neutrophilic cytoplasmic antibody          Call if shortness of breath worsens, blood in sputum, change in character of cough, development of fever or chills, inability to maintain nutrition and hydration.     Re evaluate in four weeks with results.

## 2019-03-25 LAB
ANA SER QL IF: NORMAL
ANCA AB TITR SER IF: NORMAL TITER
P-ANCA TITR SER IF: NORMAL TITER

## 2019-03-27 ENCOUNTER — TELEPHONE (OUTPATIENT)
Dept: FAMILY MEDICINE | Facility: CLINIC | Age: 54
End: 2019-03-27

## 2019-03-27 NOTE — TELEPHONE ENCOUNTER
----- Message from Alia Gilbert sent at 3/27/2019  2:59 PM CDT -----  .Type:  Sooner Apoointment Request    Caller is requesting a sooner appointment.  Caller declined first available appointment listed below.  Caller will not accept being placed on the waitlist and is requesting a message be sent to doctor.  Name of Caller:pt  When is the first available appointment? 5/6  Symptoms:multiple health issues  Would the patient rather a call back or a response via [facility]? call  Best Call Back Number:.[phone] (home)   Additional Information: prefers Friday, only wants to see

## 2019-03-29 ENCOUNTER — OFFICE VISIT (OUTPATIENT)
Dept: FAMILY MEDICINE | Facility: CLINIC | Age: 54
End: 2019-03-29
Payer: COMMERCIAL

## 2019-03-29 VITALS
WEIGHT: 148 LBS | DIASTOLIC BLOOD PRESSURE: 88 MMHG | TEMPERATURE: 98 F | SYSTOLIC BLOOD PRESSURE: 148 MMHG | BODY MASS INDEX: 23.78 KG/M2 | HEART RATE: 74 BPM | HEIGHT: 66 IN

## 2019-03-29 DIAGNOSIS — M50.30 DDD (DEGENERATIVE DISC DISEASE), CERVICAL: ICD-10-CM

## 2019-03-29 DIAGNOSIS — I10 ESSENTIAL HYPERTENSION: ICD-10-CM

## 2019-03-29 DIAGNOSIS — G89.29 CHRONIC PAIN OF BOTH SHOULDERS: ICD-10-CM

## 2019-03-29 DIAGNOSIS — F41.9 ANXIETY: ICD-10-CM

## 2019-03-29 DIAGNOSIS — M25.512 CHRONIC PAIN OF BOTH SHOULDERS: ICD-10-CM

## 2019-03-29 DIAGNOSIS — J43.2 CENTRILOBULAR EMPHYSEMA: Primary | ICD-10-CM

## 2019-03-29 DIAGNOSIS — J45.21 MILD INTERMITTENT ASTHMA WITH ACUTE EXACERBATION: ICD-10-CM

## 2019-03-29 DIAGNOSIS — M25.511 CHRONIC PAIN OF BOTH SHOULDERS: ICD-10-CM

## 2019-03-29 DIAGNOSIS — H81.02 MENIERE'S DISEASE OF LEFT EAR: ICD-10-CM

## 2019-03-29 PROCEDURE — 3077F PR MOST RECENT SYSTOLIC BLOOD PRESSURE >= 140 MM HG: ICD-10-PCS | Mod: CPTII,S$GLB,, | Performed by: FAMILY MEDICINE

## 2019-03-29 PROCEDURE — 99999 PR PBB SHADOW E&M-EST. PATIENT-LVL III: CPT | Mod: PBBFAC,,, | Performed by: FAMILY MEDICINE

## 2019-03-29 PROCEDURE — 3079F DIAST BP 80-89 MM HG: CPT | Mod: CPTII,S$GLB,, | Performed by: FAMILY MEDICINE

## 2019-03-29 PROCEDURE — 99999 PR PBB SHADOW E&M-EST. PATIENT-LVL III: ICD-10-PCS | Mod: PBBFAC,,, | Performed by: FAMILY MEDICINE

## 2019-03-29 PROCEDURE — 99214 OFFICE O/P EST MOD 30 MIN: CPT | Mod: S$GLB,,, | Performed by: FAMILY MEDICINE

## 2019-03-29 PROCEDURE — 99214 PR OFFICE/OUTPT VISIT, EST, LEVL IV, 30-39 MIN: ICD-10-PCS | Mod: S$GLB,,, | Performed by: FAMILY MEDICINE

## 2019-03-29 PROCEDURE — 3079F PR MOST RECENT DIASTOLIC BLOOD PRESSURE 80-89 MM HG: ICD-10-PCS | Mod: CPTII,S$GLB,, | Performed by: FAMILY MEDICINE

## 2019-03-29 PROCEDURE — 3077F SYST BP >= 140 MM HG: CPT | Mod: CPTII,S$GLB,, | Performed by: FAMILY MEDICINE

## 2019-03-29 PROCEDURE — 3008F PR BODY MASS INDEX (BMI) DOCUMENTED: ICD-10-PCS | Mod: CPTII,S$GLB,, | Performed by: FAMILY MEDICINE

## 2019-03-29 PROCEDURE — 3008F BODY MASS INDEX DOCD: CPT | Mod: CPTII,S$GLB,, | Performed by: FAMILY MEDICINE

## 2019-03-29 RX ORDER — LOSARTAN POTASSIUM 25 MG/1
25 TABLET ORAL DAILY
Qty: 90 TABLET | Refills: 3 | Status: SHIPPED | OUTPATIENT
Start: 2019-03-29 | End: 2020-04-03

## 2019-03-29 RX ORDER — CLONAZEPAM 0.5 MG/1
0.5 TABLET ORAL NIGHTLY PRN
Qty: 90 TABLET | Refills: 1 | Status: SHIPPED | OUTPATIENT
Start: 2019-03-29 | End: 2021-12-29
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
## 2019-04-01 ENCOUNTER — TELEPHONE (OUTPATIENT)
Dept: FAMILY MEDICINE | Facility: CLINIC | Age: 54
End: 2019-04-01

## 2019-04-01 PROBLEM — H81.02 MENIERE'S DISEASE OF LEFT EAR: Status: ACTIVE | Noted: 2019-04-01

## 2019-04-01 NOTE — TELEPHONE ENCOUNTER
Pt came in office to  paperwork. Pt stated he was feeling dizzy, light headed and had dry mouth. Pt requested to have BP taken cause Dr. Arnold had changed BP medication. /74 with a Pulse of 79. Dr. Arnold aware.

## 2019-04-11 ENCOUNTER — OFFICE VISIT (OUTPATIENT)
Dept: FAMILY MEDICINE | Facility: CLINIC | Age: 54
End: 2019-04-11
Payer: COMMERCIAL

## 2019-04-11 VITALS
HEIGHT: 66 IN | HEART RATE: 78 BPM | BODY MASS INDEX: 23.76 KG/M2 | WEIGHT: 147.81 LBS | SYSTOLIC BLOOD PRESSURE: 132 MMHG | DIASTOLIC BLOOD PRESSURE: 86 MMHG | TEMPERATURE: 98 F

## 2019-04-11 DIAGNOSIS — I10 ESSENTIAL HYPERTENSION: Primary | ICD-10-CM

## 2019-04-11 DIAGNOSIS — H81.02 MENIERE'S DISEASE OF LEFT EAR: ICD-10-CM

## 2019-04-11 PROCEDURE — 99214 OFFICE O/P EST MOD 30 MIN: CPT | Mod: S$GLB,,, | Performed by: NURSE PRACTITIONER

## 2019-04-11 PROCEDURE — 3075F SYST BP GE 130 - 139MM HG: CPT | Mod: CPTII,S$GLB,, | Performed by: NURSE PRACTITIONER

## 2019-04-11 PROCEDURE — 3079F PR MOST RECENT DIASTOLIC BLOOD PRESSURE 80-89 MM HG: ICD-10-PCS | Mod: CPTII,S$GLB,, | Performed by: NURSE PRACTITIONER

## 2019-04-11 PROCEDURE — 3079F DIAST BP 80-89 MM HG: CPT | Mod: CPTII,S$GLB,, | Performed by: NURSE PRACTITIONER

## 2019-04-11 PROCEDURE — 3075F PR MOST RECENT SYSTOLIC BLOOD PRESS GE 130-139MM HG: ICD-10-PCS | Mod: CPTII,S$GLB,, | Performed by: NURSE PRACTITIONER

## 2019-04-11 PROCEDURE — 3008F BODY MASS INDEX DOCD: CPT | Mod: CPTII,S$GLB,, | Performed by: NURSE PRACTITIONER

## 2019-04-11 PROCEDURE — 99999 PR PBB SHADOW E&M-EST. PATIENT-LVL III: CPT | Mod: PBBFAC,,, | Performed by: NURSE PRACTITIONER

## 2019-04-11 PROCEDURE — 99214 PR OFFICE/OUTPT VISIT, EST, LEVL IV, 30-39 MIN: ICD-10-PCS | Mod: S$GLB,,, | Performed by: NURSE PRACTITIONER

## 2019-04-11 PROCEDURE — 3008F PR BODY MASS INDEX (BMI) DOCUMENTED: ICD-10-PCS | Mod: CPTII,S$GLB,, | Performed by: NURSE PRACTITIONER

## 2019-04-11 PROCEDURE — 99999 PR PBB SHADOW E&M-EST. PATIENT-LVL III: ICD-10-PCS | Mod: PBBFAC,,, | Performed by: NURSE PRACTITIONER

## 2019-04-11 RX ORDER — CHLORZOXAZONE 500 MG/1
500 TABLET ORAL 3 TIMES DAILY
Refills: 1 | COMMUNITY
Start: 2019-01-03

## 2019-04-11 RX ORDER — NALDEMEDINE 0.2 MG/1
TABLET ORAL
COMMUNITY
Start: 2019-03-28 | End: 2021-12-29

## 2019-04-11 NOTE — LETTER
April 11, 2019      [facility] ([facility]  [address] [address]  Phone: [phone]  Fax: [phone]       Patient: Hector Rao   YOB: 1965  Date of Visit: 04/11/2019    To Whom It May Concern:    Edd Rao  was at [facility] on 04/11/2019. He may return to work/school on 4/15/19 with no restrictions. If you have any questions or concerns, or if I can be of further assistance, please do not hesitate to contact me.    Sincerely,    Juanjose Huynh NP

## 2019-04-11 NOTE — PATIENT INSTRUCTIONS
Decrease Losartan to 1/2 tablet (12.5 mg) once daily            Treating Menieres Disease: Eating a Low-Salt Diet    A common way to combat Menieres disease is to eat less salt. Salt contains sodium. Sodium makes your body hold extra fluid. Because Menieres disease is caused by a buildup of fluid in the inner ear, eating less sodium may help relieve your symptoms. Your doctor can tell you how many milligrams (mg) of sodium are OK to eat each day. A common recommendation is to limit sodium to no more than 1,500 mg to 2,000 mg each day. This is the amount of sodium in about 1 teaspoon of table salt.  Tips for eating less salt  · Dont add salt to food when youre cooking. Season foods with flavorings such as lemon, pepper, garlic, onion, and dried herbs instead.  · Take the saltshaker off the table. Replace it with salt-free herb mixes, spices, and salt substitutes.  · Get a cookbook of low-salt recipes. It can give you ideas for healthy and great-tasting meals.  · Choose low-salt snacks such as no-salt pretzels or crackers, air-popped popcorn, or low-fat frozen yogurt.  · Don't use condiments high in sodium. These include mustard, relish, ketchup, soy sauce, and Worcestershire sauce.  · When you eat out, ask that your food be cooked without added salt. Watch out for fast foods. These are often heavily salted.  · Read labels before buying any food that isnt fresh. Check the label for the milligrams of sodium in each serving. Watch out for high-sodium ingredients such as baking soda and saccharin.  · Don't eat foods that are pickled, smoked, or packed in brine or broth.  · If you buy antacid tablets, choose a sodium-free brand.  · Certain toothpastes, mouthwashes, and medicines may have salt added. Ask your pharmacist to recommend low-salt substitutes.  Date Last Reviewed: 10/1/2016  © 6641-9743 [employer]. [address] [place] [address], [place] [address]. All rights reserved. This information is not  intended as a substitute for professional medical care. Always follow your healthcare professional's instructions.

## 2019-04-11 NOTE — PROGRESS NOTES
Subjective:       Patient ID: Hector Rao is a 53 y.o. male.    Chief Complaint: Blood Pressure Check    Hypertension   This is a chronic problem. The current episode started more than 1 year ago. The problem is unchanged. The problem is controlled. Pertinent negatives include no chest pain, headaches, palpitations or shortness of breath. (Dizziness) Past treatments include angiotensin blockers. The current treatment provides significant improvement. Compliance problems include medication side effects.    Dizziness:   Chronicity:  Recurrent  Onset:  In the past 7 days  Progression since onset:  Waxing and waning  Frequency:  Every few minutes  Severity:  Initially severe, but improved  Dizziness characteristics:  Sensation of movement, off-balance and spinning inside head only   Associated symptoms: tinnitus and light-headedness.no ear pain, no fever, no headaches, no nausea, no vomiting, no palpitations and no chest pain.  Treatments tried: discontinued Losartan.  Improvements on treatment:  No relief         Review of Systems   Constitutional: Negative for fatigue, fever and unexpected weight change.   HENT: Positive for tinnitus. Negative for ear pain and sore throat.    Eyes: Negative.  Negative for pain and visual disturbance.   Respiratory: Negative for cough and shortness of breath.    Cardiovascular: Negative for chest pain and palpitations.   Gastrointestinal: Negative for abdominal pain, diarrhea, nausea and vomiting.   Genitourinary: Negative for dysuria and frequency.   Musculoskeletal: Negative for arthralgias and myalgias.   Skin: Negative for color change and rash.   Neurological: Positive for dizziness and light-headedness. Negative for headaches.   Psychiatric/Behavioral: Negative for sleep disturbance. The patient is not nervous/anxious.        Vitals:    04/11/19 0900   BP: 132/86   Pulse: 78   Temp: 97.9 °F (36.6 °C)       Objective:     Current Outpatient Medications   Medication Sig Dispense  Refill    albuterol (PROVENTIL/VENTOLIN HFA) 90 mcg/actuation inhaler Inhale 2 puffs into the lungs every 6 (six) hours as needed. Rescue      albuterol-ipratropium (DUO-NEB) 2.5 mg-0.5 mg/3 mL nebulizer solution Take 3 mLs by nebulization every 6 (six) hours as needed for Wheezing. Rescue 120 vial 5    aspirin (ECOTRIN) 81 MG EC tablet Take 81 mg by mouth.      atorvastatin (LIPITOR) 20 MG tablet TAKE 1 TABLET BY MOUTH EVERY DAY 30 tablet 11    budesonide-formoterol 160-4.5 mcg (SYMBICORT) 160-4.5 mcg/actuation HFAA Inhale 2 puffs into the lungs every 12 (twelve) hours. 1 Inhaler 5    chlorzoxazone (PARAFON FORTE) 500 mg Tab Take 500 mg by mouth 3 (three) times daily.  1    clonazePAM (KLONOPIN) 0.5 MG tablet Take 1 tablet (0.5 mg total) by mouth nightly as needed for Anxiety. 90 tablet 1    hydrocodone-acetaminophen 10-325mg (NORCO)  mg Tab Take 1 tablet by mouth.      losartan (COZAAR) 25 MG tablet Take 1 tablet (25 mg total) by mouth once daily. 90 tablet 3    nebulizer and compressor Susham use as directed 1 each 0    predniSONE (DELTASONE) 10 MG tablet 40 mg PO QD X 3 days then 30 mg PO QD x 3 days then 20 mg PO QD X 3 days then 10 mg PO QD X 3 days then 5 mg PO QD x 3 days then stop. 60 tablet 0    SYMPROIC 0.2 mg Tab        No current facility-administered medications for this visit.        Physical Exam   Constitutional: He is oriented to person, place, and time. He appears well-developed. No distress.   HENT:   Head: Normocephalic and atraumatic.   Eyes: Pupils are equal, round, and reactive to light. EOM are normal.   Neck: Normal range of motion. Neck supple.   Cardiovascular: Normal rate and regular rhythm.   Pulmonary/Chest: Effort normal and breath sounds normal.   Musculoskeletal: Normal range of motion.   Neurological: He is alert and oriented to person, place, and time.   Skin: Skin is warm and dry. No rash noted.   Psychiatric: He has a normal mood and affect. Thought content  normal.   Nursing note and vitals reviewed.      Assessment:       1. Essential hypertension    2. Meniere's disease of left ear        Plan:   Essential hypertension    Meniere's disease of left ear    Low sodium diet  Keep follow up with pulmonology     Follow up in about 1 month (around 5/9/2019) for repeat blood pressure.    Patient Instructions   Decrease Losartan to 1/2 tablet (12.5 mg) once daily            Treating Menieres Disease: Eating a Low-Salt Diet    A common way to combat Menieres disease is to eat less salt. Salt contains sodium. Sodium makes your body hold extra fluid. Because Menieres disease is caused by a buildup of fluid in the inner ear, eating less sodium may help relieve your symptoms. Your doctor can tell you how many milligrams (mg) of sodium are OK to eat each day. A common recommendation is to limit sodium to no more than 1,500 mg to 2,000 mg each day. This is the amount of sodium in about 1 teaspoon of table salt.  Tips for eating less salt  · Dont add salt to food when youre cooking. Season foods with flavorings such as lemon, pepper, garlic, onion, and dried herbs instead.  · Take the saltshaker off the table. Replace it with salt-free herb mixes, spices, and salt substitutes.  · Get a cookbook of low-salt recipes. It can give you ideas for healthy and great-tasting meals.  · Choose low-salt snacks such as no-salt pretzels or crackers, air-popped popcorn, or low-fat frozen yogurt.  · Don't use condiments high in sodium. These include mustard, relish, ketchup, soy sauce, and Worcestershire sauce.  · When you eat out, ask that your food be cooked without added salt. Watch out for fast foods. These are often heavily salted.  · Read labels before buying any food that isnt fresh. Check the label for the milligrams of sodium in each serving. Watch out for high-sodium ingredients such as baking soda and saccharin.  · Don't eat foods that are pickled, smoked, or packed in brine or  broth.  · If you buy antacid tablets, choose a sodium-free brand.  · Certain toothpastes, mouthwashes, and medicines may have salt added. Ask your pharmacist to recommend low-salt substitutes.  Date Last Reviewed: 10/1/2016  © 9643-1527 [employer]. [address] [place] [address], [place] [address]. All rights reserved. This information is not intended as a substitute for professional medical care. Always follow your healthcare professional's instructions.

## 2019-04-18 ENCOUNTER — HOSPITAL ENCOUNTER (OUTPATIENT)
Dept: RADIOLOGY | Facility: HOSPITAL | Age: 54
Discharge: HOME | End: 2019-04-18
Attending: INTERNAL MEDICINE
Payer: COMMERCIAL

## 2019-04-18 ENCOUNTER — CLINICAL SUPPORT (OUTPATIENT)
Dept: PULMONOLOGY | Facility: CLINIC | Age: 54
End: 2019-04-18
Payer: COMMERCIAL

## 2019-04-18 ENCOUNTER — OFFICE VISIT (OUTPATIENT)
Dept: PULMONOLOGY | Facility: CLINIC | Age: 54
End: 2019-04-18
Payer: COMMERCIAL

## 2019-04-18 VITALS — BODY MASS INDEX: 24.59 KG/M2 | HEIGHT: 64 IN | WEIGHT: 144.06 LBS

## 2019-04-18 VITALS
HEIGHT: 64 IN | WEIGHT: 144 LBS | DIASTOLIC BLOOD PRESSURE: 85 MMHG | HEART RATE: 83 BPM | BODY MASS INDEX: 24.59 KG/M2 | SYSTOLIC BLOOD PRESSURE: 113 MMHG | RESPIRATION RATE: 18 BRPM | OXYGEN SATURATION: 98 %

## 2019-04-18 DIAGNOSIS — J45.21 MILD INTERMITTENT ASTHMA WITH ACUTE EXACERBATION: Chronic | ICD-10-CM

## 2019-04-18 DIAGNOSIS — F17.210 NICOTINE DEPENDENCE, CIGARETTES, UNCOMPLICATED: Chronic | ICD-10-CM

## 2019-04-18 DIAGNOSIS — J45.20 MILD INTERMITTENT ASTHMA WITHOUT COMPLICATION: Primary | ICD-10-CM

## 2019-04-18 DIAGNOSIS — J43.2 CENTRILOBULAR EMPHYSEMA: ICD-10-CM

## 2019-04-18 DIAGNOSIS — Z12.9 SCREENING FOR CANCER: ICD-10-CM

## 2019-04-18 DIAGNOSIS — J44.89 ASTHMA WITH COPD: Primary | Chronic | ICD-10-CM

## 2019-04-18 LAB
ALLENS TEST: ABNORMAL
BRPFT: ABNORMAL
DELSYS: ABNORMAL
DLCO ADJ PRE: 18.78 ML/(MIN*MMHG) (ref 18.3–32.16)
DLCO SINGLE BREATH LLN: 18.3
DLCO SINGLE BREATH PRE REF: 74.4 %
DLCO SINGLE BREATH REF: 25.23
DLCOC SBVA LLN: 2.82
DLCOC SBVA PRE REF: 76.7 %
DLCOC SBVA REF: 4.24
DLCOC SINGLE BREATH LLN: 18.3
DLCOC SINGLE BREATH PRE REF: 74.4 %
DLCOC SINGLE BREATH REF: 25.23
DLCOVA LLN: 2.82
DLCOVA PRE REF: 76.7 %
DLCOVA PRE: 3.26 ML/(MIN*MMHG*L) (ref 2.82–5.67)
DLCOVA REF: 4.24
DLVAADJ PRE: 3.26 ML/(MIN*MMHG*L) (ref 2.82–5.67)
ERV LLN: 1.1
ERV PRE REF: 123.5 %
ERV REF: 1.1
FEF 25 75 LLN: 1.44
FEF 25 75 PRE REF: 17.6 %
FEF 25 75 REF: 2.8
FEV1 FVC LLN: 68
FEV1 FVC PRE REF: 52.5 %
FEV1 FVC REF: 79
FEV1 LLN: 2.36
FEV1 PRE REF: 65 %
FEV1 REF: 3.08
FEV6 LLN: 3.04
FEV6 PRE REF: 98.4 %
FEV6 PRE: 3.74 L (ref 3.04–4.57)
FEV6 REF: 3.8
FRCPLETH LLN: 2.23
FRCPLETH PREREF: 171.9 %
FRCPLETH REF: 3.22
FVC LLN: 3
FVC PRE REF: 123.8 %
FVC REF: 3.89
HCO3 UR-SCNC: 22.2 MMOL/L (ref 24–28)
IVC PRE: 3.74 L (ref 3–4.78)
IVC SINGLE BREATH LLN: 3
IVC SINGLE BREATH PRE REF: 96.1 %
IVC SINGLE BREATH REF: 3.89
MODE: ABNORMAL
MVV LLN: 99
MVV PRE REF: 37.6 %
MVV REF: 117
PCO2 BLDA: 38.6 MMHG (ref 35–45)
PEF LLN: 6.35
PEF PRE REF: 65.6 %
PEF REF: 8.3
PH SMN: 7.37 [PH] (ref 7.35–7.45)
PO2 BLDA: 94 MMHG (ref 80–100)
POC BE: -3 MMOL/L
POC SATURATED O2: 97 % (ref 95–100)
PRE DLCO: 18.78 ML/(MIN*MMHG) (ref 18.3–32.16)
PRE ERV: 1.36 L (ref 1.1–1.1)
PRE FEF 25 75: 0.49 L/S (ref 1.44–4.16)
PRE FET 100: 15.52 SEC
PRE FEV1 FVC: 41.64 % (ref 67.61–90.9)
PRE FEV1: 2.01 L (ref 2.36–3.81)
PRE FRC PL: 5.53 L
PRE FVC: 4.82 L (ref 3–4.78)
PRE MVV: 44 L/MIN (ref 99.42–134.5)
PRE PEF: 5.44 L/S (ref 6.35–10.25)
PRE RV: 3.28 L (ref 1.44–2.79)
PRE TLC: 8.09 L (ref 4.79–7.1)
RAW LLN: 3.06
RAW PRE REF: 155.5 %
RAW PRE: 4.76 CMH2O*S/L (ref 3.06–3.06)
RAW REF: 3.06
RV LLN: 1.44
RV PRE REF: 154.9 %
RV REF: 2.12
RVTLC LLN: 26
RVTLC PRE REF: 114.3 %
RVTLC PRE: 40.49 % (ref 26.43–44.39)
RVTLC REF: 35
SAMPLE: ABNORMAL
SITE: ABNORMAL
TLC LLN: 4.79
TLC PRE REF: 136.2 %
TLC REF: 5.94
VA PRE: 5.77 L (ref 5.79–5.79)
VA SINGLE BREATH LLN: 5.79
VA SINGLE BREATH PRE REF: 99.6 %
VA SINGLE BREATH REF: 5.79
VC LLN: 3
VC PRE REF: 123.8 %
VC PRE: 4.82 L (ref 3–4.78)
VC REF: 3.89
VTGRAWPRE: 6.21 L

## 2019-04-18 PROCEDURE — 3008F BODY MASS INDEX DOCD: CPT | Mod: CPTII,S$GLB,, | Performed by: INTERNAL MEDICINE

## 2019-04-18 PROCEDURE — 94726 PLETHYSMOGRAPHY LUNG VOLUMES: CPT | Mod: S$GLB,,, | Performed by: INTERNAL MEDICINE

## 2019-04-18 PROCEDURE — 94010 BREATHING CAPACITY TEST: CPT | Mod: 59,S$GLB,, | Performed by: INTERNAL MEDICINE

## 2019-04-18 PROCEDURE — 82803 PR  BLOOD GASES: PH, PO2 & PCO2: ICD-10-PCS | Mod: S$GLB,,, | Performed by: INTERNAL MEDICINE

## 2019-04-18 PROCEDURE — 99999 PR PBB SHADOW E&M-EST. PATIENT-LVL IV: ICD-10-PCS | Mod: PBBFAC,,, | Performed by: INTERNAL MEDICINE

## 2019-04-18 PROCEDURE — 3079F DIAST BP 80-89 MM HG: CPT | Mod: CPTII,S$GLB,, | Performed by: INTERNAL MEDICINE

## 2019-04-18 PROCEDURE — 99999 PR PBB SHADOW E&M-EST. PATIENT-LVL IV: CPT | Mod: PBBFAC,,, | Performed by: INTERNAL MEDICINE

## 2019-04-18 PROCEDURE — 94618 PULMONARY STRESS TESTING: CPT | Mod: S$GLB,,, | Performed by: INTERNAL MEDICINE

## 2019-04-18 PROCEDURE — 94729 PR C02/MEMBANE DIFFUSE CAPACITY: ICD-10-PCS | Mod: S$GLB,,, | Performed by: INTERNAL MEDICINE

## 2019-04-18 PROCEDURE — 3008F PR BODY MASS INDEX (BMI) DOCUMENTED: ICD-10-PCS | Mod: CPTII,S$GLB,, | Performed by: INTERNAL MEDICINE

## 2019-04-18 PROCEDURE — 99215 PR OFFICE/OUTPT VISIT, EST, LEVL V, 40-54 MIN: ICD-10-PCS | Mod: 25,S$GLB,, | Performed by: INTERNAL MEDICINE

## 2019-04-18 PROCEDURE — 3079F PR MOST RECENT DIASTOLIC BLOOD PRESSURE 80-89 MM HG: ICD-10-PCS | Mod: CPTII,S$GLB,, | Performed by: INTERNAL MEDICINE

## 2019-04-18 PROCEDURE — 94726 PULM FUNCT TST PLETHYSMOGRAP: ICD-10-PCS | Mod: S$GLB,,, | Performed by: INTERNAL MEDICINE

## 2019-04-18 PROCEDURE — 3074F SYST BP LT 130 MM HG: CPT | Mod: CPTII,S$GLB,, | Performed by: INTERNAL MEDICINE

## 2019-04-18 PROCEDURE — 71250 CT THORAX DX C-: CPT | Mod: TC

## 2019-04-18 PROCEDURE — 99215 OFFICE O/P EST HI 40 MIN: CPT | Mod: 25,S$GLB,, | Performed by: INTERNAL MEDICINE

## 2019-04-18 PROCEDURE — 94010 BREATHING CAPACITY TEST: ICD-10-PCS | Mod: 59,S$GLB,, | Performed by: INTERNAL MEDICINE

## 2019-04-18 PROCEDURE — 94729 DIFFUSING CAPACITY: CPT | Mod: S$GLB,,, | Performed by: INTERNAL MEDICINE

## 2019-04-18 PROCEDURE — 36600 PR WITHDRAWAL OF ARTERIAL BLOOD: ICD-10-PCS | Mod: 59,S$GLB,, | Performed by: INTERNAL MEDICINE

## 2019-04-18 PROCEDURE — 82803 BLOOD GASES ANY COMBINATION: CPT | Mod: S$GLB,,, | Performed by: INTERNAL MEDICINE

## 2019-04-18 PROCEDURE — 36600 WITHDRAWAL OF ARTERIAL BLOOD: CPT | Mod: 59,S$GLB,, | Performed by: INTERNAL MEDICINE

## 2019-04-18 PROCEDURE — 3074F PR MOST RECENT SYSTOLIC BLOOD PRESSURE < 130 MM HG: ICD-10-PCS | Mod: CPTII,S$GLB,, | Performed by: INTERNAL MEDICINE

## 2019-04-18 PROCEDURE — 94618 PULMONARY STRESS TESTING: ICD-10-PCS | Mod: S$GLB,,, | Performed by: INTERNAL MEDICINE

## 2019-04-18 NOTE — PATIENT INSTRUCTIONS
Lung Anatomy  Your lungs take air in to give your body oxygen, which the body needs to work. Your lungs, like all the tissues in your body, are made up of billions of tiny specialized cells. Old lung cells die and are replaced by new, identical lung cells. This natural process helps ensure healthy lungs.    Date Last Reviewed: 11/1/2016  © 2557-7141 [employer]. [address] [place] [address], [place] [address]. All rights reserved. This information is not intended as a substitute for professional medical care. Always follow your healthcare professional's instructions.

## 2019-04-18 NOTE — PROGRESS NOTES
Subjective:      Established patient    Patient ID: Hector Rao is a 53 y.o. male.  Patient Active Problem List   Diagnosis    Anxiety    HTN (hypertension)    Colon cancer screening    Colon polyps    Internal hemorrhoid    Nicotine dependence, cigarettes, uncomplicated    TIA (transient ischemic attack)    Hyperlipidemia    Asthma with COPD / Emphysema    DDD (degenerative disc disease), cervical    Chronic pain of both shoulders    Meniere's disease of left ear       Problem list has been reviewed.    Chief Complaint: Asthma and COPD    Group Spirometric Classification Exacerbations / year mMRC CAT   Group B: Low risk; more symptoms  GOLD 1- 2  < = 1 >= 2 >=10     FEV1 = 2.01 (65.0%)    HPI    CT chest, PFT, 6 MWT and ABG reviewed with patient who expressed and voiced understanding.   All questions were answered to the patients satisfaction.      Patients reports NYHA II dyspnea    The patient does not have currently have symptoms / an exacerbation.         He is trying to guit smoking.     COPD QUESTIONNAIRE  How often do you cough?: (P) A little of the time  How often do you have phlegm (mucus) in your chest?: (P) A little of the time  How often does your chest feel tight?: (P) A little of the time  When you walk up a hill or one flight of stairs, how often are you breathless?: (P) All of the time  How often are you limited doing any activities at home?: (P) Most of the time  How often are you confident leaving the house despite your lung condition?: (P) All of the time  How often do you sleep soundly?: (P) Some of the time  How often do you have energy?: (P) A little of the time  Total score: (P) 20     His current respiratory therapy regimen is VENTOLIN, DUONEB, SYMBICORT  which provides  relief. He is  adherent with his regimen.    No recent change in breathing.     Answers for HPI/ROS submitted by the patient on 4/18/2019   Asthma  In the past 4 weeks, how much of the time did your asthma keep  you from getting as much done at work, school, or at home?: most of the time  During the past 4 weeks, how often have you had shortness of breath?: more than once a day  During the past 4 weeks, how often did your asthma symptoms (Wheezing, coughing, shortness of breath, chest tightness or pain) wake you up at night or earlier that usual in the morning?: not at all  During the past 4 weeks, how often have you used your rescue inhaler or nebulizer medication (such as albuterol)?: 1 or 2 times per day  How would you rate your asthma control during the past 4 weeks?: somewhat controlled   : 13     A full  review of systems, past , family  and social histories was performed except as mentioned in the note above, these are non contributory to the main issues discussed today.       Previous Report Reviewed: lab reports, office notes and radiology reports     The following portions of the patient's history were reviewed and updated as appropriate: He  has a past medical history of Anxiety, Asthma, acute, Hyperlipidemia (7/31/2017), Joint pain, TIA (transient ischemic attack) (7/31/2017), and TMJ (dislocation of temporomandibular joint).  He  has a past surgical history that includes Knee surgery.  His family history includes Hypertension in his father and mother; Stroke in his father, mother, and unknown relative.  He  reports that he has been smoking cigarettes.  He started smoking about 49 years ago. He has a 82.00 pack-year smoking history. He has never used smokeless tobacco. He reports that he drinks about 1.2 oz of alcohol per week. He reports that he has current or past drug history.  He has a current medication list which includes the following prescription(s): albuterol, albuterol-ipratropium, atorvastatin, budesonide-formoterol 160-4.5 mcg, chlorzoxazone, clonazepam, hydrocodone-acetaminophen, losartan, nebulizer and compressor, prednisone, symproic, and aspirin.  He is allergic to codeine and lexapro  "[escitalopram oxalate]..    Review of Systems   Constitutional: Negative for chills, fatigue and night sweats.   HENT: Negative for nosebleeds, ear pain and hearing loss.         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   Respiratory: Positive for cough and dyspnea on extertion. Negative for snoring, wheezing and somnolence.    Cardiovascular: Negative for chest pain and leg swelling.   Genitourinary: Negative for difficulty urinating.   Endocrine: Negative for cold intolerance and heat intolerance.    Musculoskeletal: Negative for arthralgias and back pain.   Skin: Negative for rash.   Gastrointestinal: Negative for nausea and abdominal pain.   Neurological: Negative for dizziness, light-headedness and headaches.   Hematological: Excessive bruising.   Psychiatric/Behavioral: The patient is nervous/anxious.    All other systems reviewed and are negative.       Objective:     /85   Pulse 83   Resp 18   Ht 5' 4" (1.626 m)   Wt 65.3 kg (144 lb)   SpO2 98%   BMI 24.72 kg/m²   Body mass index is 24.72 kg/m².     Physical Exam   Constitutional: He is oriented to person, place, and time. He appears well-developed. No distress.   HENT:   Head: Normocephalic and atraumatic.   Right Ear: Tympanic membrane normal.   Left Ear: Tympanic membrane is injected.   Nose: Mucosal edema present.   Mouth/Throat: Posterior oropharyngeal edema present.   Eyes: Pupils are equal, round, and reactive to light. EOM are normal.   Neck: Normal range of motion. Neck supple.   Cardiovascular: Normal rate and regular rhythm.   Pulmonary/Chest: Effort normal. He has no wheezes.   Abdominal: Soft. Bowel sounds are normal.   Musculoskeletal: Normal range of motion.   Neurological: He is alert and oriented to person, place, and time.   Skin: Skin is warm and dry. Capillary refill takes less than 2 seconds. No rash noted.   Psychiatric: He has a normal mood and affect. Thought content normal. "   Nursing note and vitals reviewed.      Personal Diagnostic Review    ABG: pH: 7.368   PaO2: 94 PaCO2 38.5   SaO2 97%:   Normal ABG  expected pH = 7.38  expected CO2 = 37  expected HCO3- = 22    Pulmonary function tests: FEV1: 2.01  (65.0 % predicted), FVC:  4.82 (123.8 % predicted), FEV1/FVC:  41, T.09 (136.2 % predicted), RV/TLVC: 40.49 (114.3 % predicted), DLCO: 6.29 (74.4 % predicted) : Moderate obstruction. Lung volumes revela both hyperinflation and air trapping. Diffusion capacity is mildly reduced.    Six Minute Walk Test: Six minute walk distance is 365.76 / 530.77 meters (68.91 % predicted) with somewhat heavy dyspnea. During exercise, there was no significant desaturation while breathing room air.  Norotension was present prior to exercise.  This may represent a normal cardiovascular response to exercise.  The patient reported non-pulmonary symptoms during exercise - shoulder pain  The patient did complete the study, walking 360 seconds of the 360 second test.  Based upon age and body mass index, exercise capacity is less than predicted.      Peak VO2 during walking was 14.95 ml/min/kg [4.27 METS]    CT of chest performed on 19 without contrast:       Base of Neck: No significant abnormality.    Airways: Patent.    Lungs: Stable appearance of right apical subsegmental atelectasis with mild volume loss and parenchymal calcifications superimposed on centrilobular emphysema, likely reflecting pleural parenchymal scarring.  Minimal left apical pleural-parenchymal capping.  No suspicious    pulmonary nodules or masses.  Diffuse centrilobular emphysematous changes throughout the lungs bilaterally greatest within the upper lobes no infiltrates.  No pleural effusions.    Pleura: No pleural fluid.No pleural calcification.    Ava/Mediastinum: No pathologic mik enlargement.    Esophagus: Normal.    Heart/pericardium: Normal size.  No pericardial effusion or calcification.    Pulmonary vasculature:  Pulmonary arteries distribute normally.  There are four pulmonary veins.    Aorta: Left-sided aortic arch with 3 arterial branches.  The aorta maintains normal caliber, contour and course. There is moderate calcification of the thoracic aorta.  There is  moderate coronary artery calcification.    Thoracic soft tissues: Normal. Both breasts are present.    Bones: No acute fracture. No suspicious lytic or sclerotic lesion.    Upper Abdomen: Moderate constipation.  No abnormality of the partially imaged upper abdomen.          ASHLEY Index for COPD Survival Prediction   Select Criteria:   FEV1 % Predicted After Bronchodialator     [x] >= 65% (0 points)    [] 50-64% (1 point)    [] 36-49% (2 points)    [] <= 35% (3 points)   6 Minute Walk Distance     [x] >= 350 Meters (0 points)    [] 250-349 Meters (1 point)    [] 150-249 Meters (2 points)    [] <= 149 Meters (3 points)   MMRC Dyspnea Scale (4 is worst)     [x] MMRC 0: Dyspneic on strenuous excercise (0 points)    [] MMRC 1: Dyspneic on walking a slight hill (0 points)    [] MMRC 2: Dyspneic on walking level ground; must stop occasionally due to breathlessness (1 point)    [] MMRC 3: Must stop for breathlessness after walking 100 yards or after a few minutes (2 points)    [] MMRC 4: Cannot leave house; breathless on dressing/undressing (3 points)   Body Mass Index     [x] > 21 (0 points)    [] <= 21 (1 point)   Results: Total Criteria Point Count:     Approximate 4 Year Survival Interpretation   0-2 Points:  [x] 80%   3-4 Points:  [] 67%   5-6 Points:  [] 57%   7-10 Points:[] 18%         References  1. Celli BR, Atif CG, Jeff JM, et. al. The body-mass index, airflow obstruction, dyspnea and exercise capacity index in chronic obstructive pulmonary disease. N Engl J Med. 2004 Mar 4;350(10):1005-12.  2. Michell DA, Collin CK. Evaluation of clinical methods for rating dyspnea. Chest. 1988 Mar;93(3):580-6      Assessment / Plan:       Discussed diagnosis, its evaluation,  treatment and usual course. All questions answered.    Problem List Items Addressed This Visit        Pulmonary    Asthma with COPD / Emphysema - Primary (Chronic)    Current Assessment & Plan     Asthma with COPD ROS: taking medications as instructed, no medication side effects noted, no significant ongoing wheezing or shortness of breath, using bronchodilator MDI less than twice a week.   New concerns: persistent dyspnea   Exam:  wheezing noted diffusely.   Assessment:  Asthma with COPD  stable  Plan:  VENTOLIN, DUONEB, SYMBICORT. Current treatment plan is effective, no change in therapy.         Relevant Orders    Complete PFT without bronchodilator - Clinic       Other    Nicotine dependence, cigarettes, uncomplicated    Current Assessment & Plan     Assistance with smoking cessation was offered, including:  []  Medications  [x]  Counseling  []  Printed Information on Smoking Cessation  []  Referral to a Smoking Cessation Program    Patient was counseled regarding smoking for 3-10 minutes.         Relevant Orders    Ambulatory referral to Smoking Cessation Program      Other Visit Diagnoses     Screening for cancer        Relevant Orders    CT Chest Lung Screening Low Dose            TIME SPENT WITH PATIENT: Time spent: 40 minutes in face to face  discussion concerning diagnosis, prognosis, review of lab and test results, benefits of treatment as well as management of disease, counseling of patient and coordination of care between various health  care providers . Greater than half the time spent was used for coordination of care and counseling of patient.       Follow up in about 1 year (around 4/18/2020) for Asthma with COPD, Tobacco use disorder.

## 2019-04-18 NOTE — PROCEDURES
Physician interpretation:    ABG: pH: 7.368   PaO2: 94 PaCO2 38.5   SaO2 97%:       Normal ABG  expected pH = 7.38  expected CO2 = 37  expected HCO3- = 22

## 2019-04-18 NOTE — PROCEDURES
"[facility]  Six Minute Walk     SUMMARY     Ordering Provider: Dr. Butler   Interpreting Provider: Dr. Butler  Performing nurse/tech/RT: NANETTE Mendoza  Diagnosis: (Asthma)  Height: 5' 4" (162.6 cm)  Weight: 65.4 kg (144 lb 1.1 oz)  BMI (Calculated): 24.8   Patient Race:             Phase Oxygen Assessment Supplemental O2 Heart   Rate Blood Pressure Sukumar Dyspnea Scale Rating   Resting 97 % Room Air 86 bpm 114/77 2   Exercise        Minute        1 96 % Room Air 105 bpm     2 96 % Room Air 102 bpm     3 96 % Room Air 99 bpm     4 96 % Room Air 99 bpm     5 97 % Room Air 104 bpm     6  97 % Room Air 110 bpm 131/74 5-6   Recovery        Minute        1 98 % Room Air 82 bpm     2 98 % Room Air 86 bpm     3 98 % Room Air 83 bpm     4 98 % Room Air 83 bpm 113/85 3     Six Minute Walk Summary  6MWT Status: completed without stopping  Patient Reported: Dyspnea, Other (Comment)( R Shoulder Pain)     Interpretation:  Did the patient stop or pause?: No                                         Total Time Walked (Calculated): 360 seconds  Final Partial Lap Distance (feet): 0 feet  Total Distance Meters (Calculated): 365.76 meters  Predicted Distance Meters (Calculated): 530.77 meters  Percentage of Predicted (Calculated): 68.91  Peak VO2 (Calculated): 14.95  Mets: 4.27  Has The Patient Had a Previous Six Minute Walk Test?: No       Previous 6MWT Results  Has The Patient Had a Previous Six Minute Walk Test?: No      PHYSICIAN INTERPRETATION:  Six minute walk distance is 365.76 / 530.77 meters (68.91 % predicted) with somewhat heavy dyspnea. During exercise, there was no significant desaturation while breathing room air.  Norotension was present prior to exercise.  This may represent a normal cardiovascular response to exercise.  The patient reported non-pulmonary symptoms during exercise - shoulder pain  The patient did complete the study, walking 360 seconds of the 360 second test.  Based upon age and " body mass index, exercise capacity is less than predicted.      Peak VO2 during walking was 14.95 ml/min/kg [4.27 METS

## 2019-04-18 NOTE — ASSESSMENT & PLAN NOTE
Asthma with COPD ROS: taking medications as instructed, no medication side effects noted, no significant ongoing wheezing or shortness of breath, using bronchodilator MDI less than twice a week.   New concerns: persistent dyspnea   Exam:  wheezing noted diffusely.   Assessment:  Asthma with COPD  stable  Plan:  VENTOLIN, DUONEB, SYMBICORT. Current treatment plan is effective, no change in therapy.

## 2019-05-10 ENCOUNTER — OFFICE VISIT (OUTPATIENT)
Dept: FAMILY MEDICINE | Facility: CLINIC | Age: 54
End: 2019-05-10
Payer: COMMERCIAL

## 2019-05-10 VITALS
WEIGHT: 147 LBS | BODY MASS INDEX: 24.49 KG/M2 | TEMPERATURE: 98 F | HEIGHT: 65 IN | SYSTOLIC BLOOD PRESSURE: 147 MMHG | DIASTOLIC BLOOD PRESSURE: 93 MMHG | HEART RATE: 62 BPM

## 2019-05-10 DIAGNOSIS — I10 ESSENTIAL HYPERTENSION: Primary | ICD-10-CM

## 2019-05-10 DIAGNOSIS — J44.89 ASTHMA WITH COPD: Chronic | ICD-10-CM

## 2019-05-10 PROCEDURE — 99999 PR PBB SHADOW E&M-EST. PATIENT-LVL III: ICD-10-PCS | Mod: PBBFAC,,, | Performed by: NURSE PRACTITIONER

## 2019-05-10 PROCEDURE — 3080F DIAST BP >= 90 MM HG: CPT | Mod: CPTII,S$GLB,, | Performed by: NURSE PRACTITIONER

## 2019-05-10 PROCEDURE — 99214 OFFICE O/P EST MOD 30 MIN: CPT | Mod: S$GLB,,, | Performed by: NURSE PRACTITIONER

## 2019-05-10 PROCEDURE — 99999 PR PBB SHADOW E&M-EST. PATIENT-LVL III: CPT | Mod: PBBFAC,,, | Performed by: NURSE PRACTITIONER

## 2019-05-10 PROCEDURE — 3077F SYST BP >= 140 MM HG: CPT | Mod: CPTII,S$GLB,, | Performed by: NURSE PRACTITIONER

## 2019-05-10 PROCEDURE — 3008F PR BODY MASS INDEX (BMI) DOCUMENTED: ICD-10-PCS | Mod: CPTII,S$GLB,, | Performed by: NURSE PRACTITIONER

## 2019-05-10 PROCEDURE — 99214 PR OFFICE/OUTPT VISIT, EST, LEVL IV, 30-39 MIN: ICD-10-PCS | Mod: S$GLB,,, | Performed by: NURSE PRACTITIONER

## 2019-05-10 PROCEDURE — 3077F PR MOST RECENT SYSTOLIC BLOOD PRESSURE >= 140 MM HG: ICD-10-PCS | Mod: CPTII,S$GLB,, | Performed by: NURSE PRACTITIONER

## 2019-05-10 PROCEDURE — 3080F PR MOST RECENT DIASTOLIC BLOOD PRESSURE >= 90 MM HG: ICD-10-PCS | Mod: CPTII,S$GLB,, | Performed by: NURSE PRACTITIONER

## 2019-05-10 PROCEDURE — 3008F BODY MASS INDEX DOCD: CPT | Mod: CPTII,S$GLB,, | Performed by: NURSE PRACTITIONER

## 2019-05-10 RX ORDER — BENAZEPRIL HYDROCHLORIDE 10 MG/1
10 TABLET ORAL
COMMUNITY
Start: 2013-11-11 | End: 2019-05-10

## 2019-05-10 RX ORDER — FLUTICASONE PROPIONATE AND SALMETEROL 250; 50 UG/1; UG/1
1 POWDER RESPIRATORY (INHALATION) 2 TIMES DAILY
Qty: 60 EACH | Refills: 11 | Status: SHIPPED | OUTPATIENT
Start: 2019-05-10 | End: 2020-05-28

## 2019-05-10 NOTE — LETTER
May 10, 2019      [facility]  [address] [facility] [address] [facility] [address]  Phone: [phone]  Fax: [phone]       Patient: Hector Rao   YOB: 1965  Date of Visit: 05/10/2019    To Whom It May Concern:    Edd Rao  was at [facility] on 05/10/2019. He may return to work/school on 5/21/19 with no restrictions. If you have any questions or concerns, or if I can be of further assistance, please do not hesitate to contact me.    Sincerely,    Juanjose Huynh NP

## 2019-05-10 NOTE — PROGRESS NOTES
Subjective:       Patient ID: Hector Rao is a 53 y.o. male.    Chief Complaint: Annual Exam    Hypertension   This is a chronic problem. The current episode started more than 1 year ago. The problem has been waxing and waning since onset. Condition status: home readings 130-150/80's. Pertinent negatives include no chest pain, headaches, palpitations or shortness of breath. Past treatments include angiotensin blockers.   Shortness of Breath   This is a chronic problem. The current episode started more than 1 year ago. The problem occurs constantly. The problem has been waxing and waning. Associated symptoms include wheezing. Pertinent negatives include no abdominal pain, chest pain, ear pain, fever, headaches, rash, sore throat or vomiting.          Review of Systems   Constitutional: Negative for fatigue, fever and unexpected weight change.   HENT: Negative for ear pain and sore throat.    Eyes: Negative.  Negative for pain and visual disturbance.   Respiratory: Positive for wheezing. Negative for cough and shortness of breath.    Cardiovascular: Negative for chest pain and palpitations.   Gastrointestinal: Negative for abdominal pain, diarrhea, nausea and vomiting.   Genitourinary: Negative for dysuria and frequency.   Musculoskeletal: Negative for arthralgias and myalgias.   Skin: Negative for color change and rash.   Neurological: Negative for dizziness and headaches.   Psychiatric/Behavioral: Negative for sleep disturbance. The patient is not nervous/anxious.        Vitals:    05/10/19 0808   BP: (!) 147/93   Pulse: 62   Temp: 97.9 °F (36.6 °C)       Objective:     Current Outpatient Medications   Medication Sig Dispense Refill    albuterol (PROVENTIL/VENTOLIN HFA) 90 mcg/actuation inhaler Inhale 2 puffs into the lungs every 6 (six) hours as needed. Rescue      albuterol-ipratropium (DUO-NEB) 2.5 mg-0.5 mg/3 mL nebulizer solution Take 3 mLs by nebulization every 6 (six) hours as needed for Wheezing. Rescue  120 vial 5    atorvastatin (LIPITOR) 20 MG tablet TAKE 1 TABLET BY MOUTH EVERY DAY 30 tablet 11    chlorzoxazone (PARAFON FORTE) 500 mg Tab Take 500 mg by mouth 3 (three) times daily.  1    clonazePAM (KLONOPIN) 0.5 MG tablet Take 1 tablet (0.5 mg total) by mouth nightly as needed for Anxiety. 90 tablet 1    hydrocodone-acetaminophen 10-325mg (NORCO)  mg Tab Take 1 tablet by mouth.      losartan (COZAAR) 25 MG tablet Take 1 tablet (25 mg total) by mouth once daily. 90 tablet 3    nebulizer and compressor Sushma use as directed 1 each 0    predniSONE (DELTASONE) 10 MG tablet 40 mg PO QD X 3 days then 30 mg PO QD x 3 days then 20 mg PO QD X 3 days then 10 mg PO QD X 3 days then 5 mg PO QD x 3 days then stop. 60 tablet 0    SYMPROIC 0.2 mg Tab       aspirin (ECOTRIN) 81 MG EC tablet Take 81 mg by mouth.      fluticasone-salmeterol diskus inhaler 250-50 mcg Inhale 1 puff into the lungs 2 (two) times daily. Controller 60 each 11     No current facility-administered medications for this visit.        Physical Exam   Constitutional: He is oriented to person, place, and time. He appears well-developed. No distress.   HENT:   Head: Normocephalic and atraumatic.   Eyes: Pupils are equal, round, and reactive to light. EOM are normal.   Neck: Normal range of motion. Neck supple.   Cardiovascular: Normal rate and regular rhythm.   Pulmonary/Chest: Effort normal and breath sounds normal.   Musculoskeletal: Normal range of motion.   Neurological: He is alert and oriented to person, place, and time.   Skin: Skin is warm and dry. No rash noted.   Psychiatric: He has a normal mood and affect. Thought content normal.   Nursing note and vitals reviewed.      Assessment:       1. Essential hypertension    2. Asthma with COPD / Emphysema        Plan:   Essential hypertension    Asthma with COPD / Emphysema    Other orders  -     fluticasone-salmeterol diskus inhaler 250-50 mcg; Inhale 1 puff into the lungs 2 (two) times  daily. Controller  Dispense: 60 each; Refill: 11        No follow-ups on file.    There are no Patient Instructions on file for this visit.

## 2019-05-17 ENCOUNTER — TELEPHONE (OUTPATIENT)
Dept: SMOKING CESSATION | Facility: CLINIC | Age: 54
End: 2019-05-17

## 2019-05-23 ENCOUNTER — PATIENT MESSAGE (OUTPATIENT)
Dept: FAMILY MEDICINE | Facility: CLINIC | Age: 54
End: 2019-05-23

## 2019-05-24 ENCOUNTER — OFFICE VISIT (OUTPATIENT)
Dept: FAMILY MEDICINE | Facility: CLINIC | Age: 54
End: 2019-05-24
Payer: COMMERCIAL

## 2019-05-24 VITALS
WEIGHT: 148.81 LBS | HEIGHT: 66 IN | HEART RATE: 91 BPM | DIASTOLIC BLOOD PRESSURE: 91 MMHG | SYSTOLIC BLOOD PRESSURE: 156 MMHG | TEMPERATURE: 98 F | BODY MASS INDEX: 23.92 KG/M2

## 2019-05-24 DIAGNOSIS — J45.21 MILD INTERMITTENT ASTHMA WITH ACUTE EXACERBATION: Chronic | ICD-10-CM

## 2019-05-24 DIAGNOSIS — I10 ESSENTIAL HYPERTENSION: ICD-10-CM

## 2019-05-24 DIAGNOSIS — J44.1 COPD WITH ACUTE EXACERBATION: Primary | ICD-10-CM

## 2019-05-24 PROCEDURE — 99999 PR PBB SHADOW E&M-EST. PATIENT-LVL III: ICD-10-PCS | Mod: PBBFAC,,, | Performed by: FAMILY MEDICINE

## 2019-05-24 PROCEDURE — 3080F DIAST BP >= 90 MM HG: CPT | Mod: CPTII,S$GLB,, | Performed by: FAMILY MEDICINE

## 2019-05-24 PROCEDURE — 99214 OFFICE O/P EST MOD 30 MIN: CPT | Mod: 25,S$GLB,, | Performed by: FAMILY MEDICINE

## 2019-05-24 PROCEDURE — 3080F PR MOST RECENT DIASTOLIC BLOOD PRESSURE >= 90 MM HG: ICD-10-PCS | Mod: CPTII,S$GLB,, | Performed by: FAMILY MEDICINE

## 2019-05-24 PROCEDURE — 96372 PR INJECTION,THERAP/PROPH/DIAG2ST, IM OR SUBCUT: ICD-10-PCS | Mod: S$GLB,,, | Performed by: FAMILY MEDICINE

## 2019-05-24 PROCEDURE — 99999 PR PBB SHADOW E&M-EST. PATIENT-LVL III: CPT | Mod: PBBFAC,,, | Performed by: FAMILY MEDICINE

## 2019-05-24 PROCEDURE — 3008F BODY MASS INDEX DOCD: CPT | Mod: CPTII,S$GLB,, | Performed by: FAMILY MEDICINE

## 2019-05-24 PROCEDURE — 3077F PR MOST RECENT SYSTOLIC BLOOD PRESSURE >= 140 MM HG: ICD-10-PCS | Mod: CPTII,S$GLB,, | Performed by: FAMILY MEDICINE

## 2019-05-24 PROCEDURE — 96372 THER/PROPH/DIAG INJ SC/IM: CPT | Mod: S$GLB,,, | Performed by: FAMILY MEDICINE

## 2019-05-24 PROCEDURE — 99214 PR OFFICE/OUTPT VISIT, EST, LEVL IV, 30-39 MIN: ICD-10-PCS | Mod: 25,S$GLB,, | Performed by: FAMILY MEDICINE

## 2019-05-24 PROCEDURE — 3077F SYST BP >= 140 MM HG: CPT | Mod: CPTII,S$GLB,, | Performed by: FAMILY MEDICINE

## 2019-05-24 PROCEDURE — 3008F PR BODY MASS INDEX (BMI) DOCUMENTED: ICD-10-PCS | Mod: CPTII,S$GLB,, | Performed by: FAMILY MEDICINE

## 2019-05-24 RX ORDER — PREDNISONE 10 MG/1
TABLET ORAL
Qty: 60 TABLET | Refills: 0 | Status: SHIPPED | OUTPATIENT
Start: 2019-05-24 | End: 2019-10-08

## 2019-05-24 RX ORDER — DEXAMETHASONE SODIUM PHOSPHATE 4 MG/ML
8 INJECTION, SOLUTION INTRA-ARTICULAR; INTRALESIONAL; INTRAMUSCULAR; INTRAVENOUS; SOFT TISSUE ONCE
Status: COMPLETED | OUTPATIENT
Start: 2019-05-24 | End: 2019-05-24

## 2019-05-24 RX ADMIN — DEXAMETHASONE SODIUM PHOSPHATE 8 MG: 4 INJECTION, SOLUTION INTRA-ARTICULAR; INTRALESIONAL; INTRAMUSCULAR; INTRAVENOUS; SOFT TISSUE at 09:05

## 2019-05-24 NOTE — PROGRESS NOTES
Subjective:      Patient ID: Hector Rao is a 53 y.o. male.    Chief Complaint: Follow-up (Needs paperwork completed)    HPI he is f/u on his COPD that has rendered him not able to work. He has followed up with the pulmonary doctor and he had a recent visit here in the office.  He did start advair.  He is here to get short term disability forms filled out. 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    The patient has hypertension and it was noted to be elevated today.   He is taking 1/2 of a 25 mg tab daily.   His bp is up on recheck so hew will resume the 25 mg a day and recheck the bp in 2 weeks.     He is wheezing now and is SOB.  He has not had fever or chills.    Health Maintenance Due   Topic Date Due    Lipid Panel  10/31/2018       Past Medical History:  Past Medical History:   Diagnosis Date    Anxiety     Asthma, acute     Hyperlipidemia 7/31/2017    Joint pain     TIA (transient ischemic attack) 7/31/2017    TMJ (dislocation of temporomandibular joint)      Past Surgical History:   Procedure Laterality Date    COLONOSCOPY N/A 3/17/2015    Performed by Jerome [facility] at [facility]    KNEE SURGERY      left side     Review of patient's allergies indicates:   Allergen Reactions    Codeine Nausea And Vomiting     Other reaction(s): Nausea    Lexapro [escitalopram oxalate]      Erectile dysfunction.     Current Outpatient Medications on File Prior to Visit   Medication Sig Dispense Refill    albuterol (PROVENTIL/VENTOLIN HFA) 90 mcg/actuation inhaler Inhale 2 puffs into the lungs every 6 (six) hours as needed. Rescue      albuterol-ipratropium (DUO-NEB) 2.5 mg-0.5 mg/3 mL nebulizer solution Take 3 mLs by nebulization every 6 (six) hours as needed for Wheezing. Rescue 120 vial 5    aspirin (ECOTRIN) 81 MG EC tablet Take 81 mg  by mouth.      atorvastatin (LIPITOR) 20 MG tablet TAKE 1 TABLET BY MOUTH EVERY DAY 30 tablet 11    chlorzoxazone (PARAFON FORTE) 500 mg Tab Take 500 mg by mouth 3 (three) times daily.  1    clonazePAM (KLONOPIN) 0.5 MG tablet Take 1 tablet (0.5 mg total) by mouth nightly as needed for Anxiety. 90 tablet 1    fluticasone-salmeterol diskus inhaler 250-50 mcg Inhale 1 puff into the lungs 2 (two) times daily. Controller 60 each 11    hydrocodone-acetaminophen 10-325mg (NORCO)  mg Tab Take 1 tablet by mouth.      losartan (COZAAR) 25 MG tablet Take 1 tablet (25 mg total) by mouth once daily. 90 tablet 3    nebulizer and compressor Sushma use as directed 1 each 0    predniSONE (DELTASONE) 10 MG tablet 40 mg PO QD X 3 days then 30 mg PO QD x 3 days then 20 mg PO QD X 3 days then 10 mg PO QD X 3 days then 5 mg PO QD x 3 days then stop. 60 tablet 0    SYMPROIC 0.2 mg Tab        No current facility-administered medications on file prior to visit.      Social History     Socioeconomic History    Marital status:      Spouse name: Myrtle    Number of children: 2    Years of education: Not on file    Highest education level: Not on file   Occupational History    Occupation: Electronic Tech     Employer: [employer]     Employer:  [employer]   Social Needs    Financial resource strain: Not on file    Food insecurity:     Worry: Not on file     Inability: Not on file    Transportation needs:     Medical: Not on file     Non-medical: Not on file   Tobacco Use    Smoking status: Current Every Day Smoker     Packs/day: 2.00     Years: 41.00     Pack years: 82.00     Types: Cigarettes     Start date: 1970    Smokeless tobacco: Never Used   Substance and Sexual Activity    Alcohol use: Yes     Alcohol/week: 1.2 oz     Types: 2 Cans of beer per week     Comment: He is a reformed alcoholic/ 2 beers per week    Drug use: Yes     Comment: He used to use marijuana.    Sexual activity: Yes      "Partners: Female   Lifestyle    Physical activity:     Days per week: Not on file     Minutes per session: Not on file    Stress: Not on file   Relationships    Social connections:     Talks on phone: Not on file     Gets together: Not on file     Attends Sikhism service: Not on file     Active member of club or organization: Not on file     Attends meetings of clubs or organizations: Not on file     Relationship status: Not on file   Other Topics Concern    Not on file   Social History Narrative    Not on file     Family History   Problem Relation Age of Onset    Stroke Mother     Hypertension Mother     Stroke Father     Hypertension Father     Stroke Unknown         grandmother/grandfather             Review of Systems   Constitutional: Positive for activity change. Negative for unexpected weight change.   HENT: Positive for hearing loss. Negative for rhinorrhea and trouble swallowing.    Eyes: Positive for visual disturbance. Negative for discharge.   Respiratory: Positive for chest tightness and wheezing.    Cardiovascular: Positive for chest pain and palpitations.   Gastrointestinal: Positive for constipation. Negative for blood in stool, diarrhea and vomiting.   Endocrine: Negative for polydipsia and polyuria.   Genitourinary: Negative for difficulty urinating, hematuria and urgency.   Musculoskeletal: Positive for arthralgias and neck pain. Negative for joint swelling.   Neurological: Positive for weakness and headaches.   Psychiatric/Behavioral: Positive for confusion and dysphoric mood.       Objective:   BP (!) 156/91   Pulse 91   Temp 98.1 °F (36.7 °C) (Oral)   Ht 5' 6" (1.676 m)   Wt 67.5 kg (148 lb 12.8 oz)   BMI 24.02 kg/m²     Physical Exam   Constitutional: He appears well-developed and well-nourished. No distress.   HENT:   Head: Normocephalic and atraumatic.   Right Ear: External ear normal.   Left Ear: External ear normal.   Nose: Nose normal.   Mouth/Throat: Oropharynx is clear " and moist. No oropharyngeal exudate.   Eyes: Pupils are equal, round, and reactive to light. Conjunctivae and EOM are normal. Right eye exhibits no discharge. Left eye exhibits no discharge. No scleral icterus.   Neck: Normal range of motion. Neck supple. No thyromegaly present.   Cardiovascular: Normal rate, regular rhythm, normal heart sounds and intact distal pulses. Exam reveals no gallop and no friction rub.   No murmur heard.  Pulmonary/Chest: Effort normal. No stridor. No respiratory distress. He has wheezes. He has no rales. He exhibits no tenderness.   Lymphadenopathy:     He has no cervical adenopathy.   Skin: He is not diaphoretic.       Assessment:     1. COPD with acute exacerbation    2. Mild intermittent asthma with acute exacerbation Active   3. Essential hypertension        Plan:   Hector was seen today for follow-up.    Diagnoses and all orders for this visit:    COPD with acute exacerbation    Mild intermittent asthma with acute exacerbation  -     predniSONE (DELTASONE) 10 MG tablet; 40 mg PO QD X 3 days then 30 mg PO QD x 3 days then 20 mg PO QD X 3 days then 10 mg PO QD X 3 days then 5 mg PO QD x 3 days then stop.    Essential hypertension    Other orders  -     dexamethasone injection 8 mg    increase losartan to 25 mg po q day.  rtc in 3 weeks for a recheck.  I filled out forms for him today for short term disability.

## 2019-05-30 ENCOUNTER — PATIENT OUTREACH (OUTPATIENT)
Dept: ADMINISTRATIVE | Facility: HOSPITAL | Age: 54
End: 2019-05-30

## 2019-05-31 ENCOUNTER — TELEPHONE (OUTPATIENT)
Dept: SMOKING CESSATION | Facility: CLINIC | Age: 54
End: 2019-05-31

## 2019-06-10 ENCOUNTER — PATIENT MESSAGE (OUTPATIENT)
Dept: FAMILY MEDICINE | Facility: CLINIC | Age: 54
End: 2019-06-10

## 2019-06-13 ENCOUNTER — LAB VISIT (OUTPATIENT)
Dept: LAB | Facility: HOSPITAL | Age: 54
End: 2019-06-13
Attending: NURSE PRACTITIONER
Payer: COMMERCIAL

## 2019-06-13 ENCOUNTER — OFFICE VISIT (OUTPATIENT)
Dept: FAMILY MEDICINE | Facility: CLINIC | Age: 54
End: 2019-06-13
Payer: COMMERCIAL

## 2019-06-13 VITALS
DIASTOLIC BLOOD PRESSURE: 76 MMHG | SYSTOLIC BLOOD PRESSURE: 120 MMHG | HEIGHT: 66 IN | BODY MASS INDEX: 24.3 KG/M2 | WEIGHT: 151.19 LBS | TEMPERATURE: 98 F | HEART RATE: 77 BPM

## 2019-06-13 DIAGNOSIS — Z12.5 SCREENING FOR PROSTATE CANCER: ICD-10-CM

## 2019-06-13 DIAGNOSIS — N52.9 ERECTILE DYSFUNCTION, UNSPECIFIED ERECTILE DYSFUNCTION TYPE: ICD-10-CM

## 2019-06-13 DIAGNOSIS — I10 ESSENTIAL HYPERTENSION: Primary | ICD-10-CM

## 2019-06-13 DIAGNOSIS — R53.83 FATIGUE, UNSPECIFIED TYPE: ICD-10-CM

## 2019-06-13 DIAGNOSIS — I10 ESSENTIAL HYPERTENSION: ICD-10-CM

## 2019-06-13 LAB
25(OH)D3+25(OH)D2 SERPL-MCNC: 23 NG/ML (ref 30–96)
ALBUMIN SERPL BCP-MCNC: 3.7 G/DL (ref 3.5–5.2)
ALP SERPL-CCNC: 56 U/L (ref 55–135)
ALT SERPL W/O P-5'-P-CCNC: 20 U/L (ref 10–44)
ANION GAP SERPL CALC-SCNC: 10 MMOL/L (ref 8–16)
AST SERPL-CCNC: 14 U/L (ref 10–40)
BILIRUB SERPL-MCNC: 0.4 MG/DL (ref 0.1–1)
BUN SERPL-MCNC: 14 MG/DL (ref 6–20)
CALCIUM SERPL-MCNC: 9.6 MG/DL (ref 8.7–10.5)
CHLORIDE SERPL-SCNC: 105 MMOL/L (ref 95–110)
CHOLEST SERPL-MCNC: 147 MG/DL (ref 120–199)
CHOLEST/HDLC SERPL: 2.1 {RATIO} (ref 2–5)
CO2 SERPL-SCNC: 25 MMOL/L (ref 23–29)
COMPLEXED PSA SERPL-MCNC: 0.51 NG/ML (ref 0–4)
CREAT SERPL-MCNC: 0.9 MG/DL (ref 0.5–1.4)
EST. GFR  (AFRICAN AMERICAN): >60 ML/MIN/1.73 M^2
EST. GFR  (NON AFRICAN AMERICAN): >60 ML/MIN/1.73 M^2
GLUCOSE SERPL-MCNC: 94 MG/DL (ref 70–110)
HDLC SERPL-MCNC: 69 MG/DL (ref 40–75)
HDLC SERPL: 46.9 % (ref 20–50)
LDLC SERPL CALC-MCNC: 68.4 MG/DL (ref 63–159)
NONHDLC SERPL-MCNC: 78 MG/DL
POTASSIUM SERPL-SCNC: 3.7 MMOL/L (ref 3.5–5.1)
PROT SERPL-MCNC: 6.8 G/DL (ref 6–8.4)
SODIUM SERPL-SCNC: 140 MMOL/L (ref 136–145)
TESTOST SERPL-MCNC: 424 NG/DL (ref 304–1227)
TRIGL SERPL-MCNC: 48 MG/DL (ref 30–150)
TSH SERPL DL<=0.005 MIU/L-ACNC: 1.05 UIU/ML (ref 0.4–4)
VIT B12 SERPL-MCNC: 387 PG/ML (ref 210–950)

## 2019-06-13 PROCEDURE — 99214 PR OFFICE/OUTPT VISIT, EST, LEVL IV, 30-39 MIN: ICD-10-PCS | Mod: S$GLB,,, | Performed by: NURSE PRACTITIONER

## 2019-06-13 PROCEDURE — 36415 COLL VENOUS BLD VENIPUNCTURE: CPT | Mod: PO

## 2019-06-13 PROCEDURE — 80061 LIPID PANEL: CPT

## 2019-06-13 PROCEDURE — 3078F PR MOST RECENT DIASTOLIC BLOOD PRESSURE < 80 MM HG: ICD-10-PCS | Mod: CPTII,S$GLB,, | Performed by: NURSE PRACTITIONER

## 2019-06-13 PROCEDURE — 82306 VITAMIN D 25 HYDROXY: CPT

## 2019-06-13 PROCEDURE — 82607 VITAMIN B-12: CPT

## 2019-06-13 PROCEDURE — 3074F SYST BP LT 130 MM HG: CPT | Mod: CPTII,S$GLB,, | Performed by: NURSE PRACTITIONER

## 2019-06-13 PROCEDURE — 84443 ASSAY THYROID STIM HORMONE: CPT

## 2019-06-13 PROCEDURE — 99214 OFFICE O/P EST MOD 30 MIN: CPT | Mod: S$GLB,,, | Performed by: NURSE PRACTITIONER

## 2019-06-13 PROCEDURE — 3008F PR BODY MASS INDEX (BMI) DOCUMENTED: ICD-10-PCS | Mod: CPTII,S$GLB,, | Performed by: NURSE PRACTITIONER

## 2019-06-13 PROCEDURE — 80053 COMPREHEN METABOLIC PANEL: CPT

## 2019-06-13 PROCEDURE — 3008F BODY MASS INDEX DOCD: CPT | Mod: CPTII,S$GLB,, | Performed by: NURSE PRACTITIONER

## 2019-06-13 PROCEDURE — 3074F PR MOST RECENT SYSTOLIC BLOOD PRESSURE < 130 MM HG: ICD-10-PCS | Mod: CPTII,S$GLB,, | Performed by: NURSE PRACTITIONER

## 2019-06-13 PROCEDURE — 84153 ASSAY OF PSA TOTAL: CPT

## 2019-06-13 PROCEDURE — 99999 PR PBB SHADOW E&M-EST. PATIENT-LVL III: ICD-10-PCS | Mod: PBBFAC,,, | Performed by: NURSE PRACTITIONER

## 2019-06-13 PROCEDURE — 3078F DIAST BP <80 MM HG: CPT | Mod: CPTII,S$GLB,, | Performed by: NURSE PRACTITIONER

## 2019-06-13 PROCEDURE — 99999 PR PBB SHADOW E&M-EST. PATIENT-LVL III: CPT | Mod: PBBFAC,,, | Performed by: NURSE PRACTITIONER

## 2019-06-13 PROCEDURE — 84403 ASSAY OF TOTAL TESTOSTERONE: CPT

## 2019-06-13 RX ORDER — TADALAFIL 5 MG/1
5 TABLET ORAL DAILY PRN
Qty: 30 TABLET | Refills: 5 | Status: SHIPPED | OUTPATIENT
Start: 2019-06-13 | End: 2020-10-30

## 2019-06-13 NOTE — PROGRESS NOTES
Subjective:       Patient ID: Hector Rao is a 53 y.o. male.    Chief Complaint: Follow-up and Blood Pressure Check    Hypertension   This is a chronic problem. The current episode started more than 1 year ago. The problem has been waxing and waning since onset. Pertinent negatives include no chest pain, headaches, palpitations or shortness of breath. Past treatments include angiotensin blockers.   Erectile Dysfunction   This is a chronic problem. The current episode started more than 1 month ago. The problem has been waxing and waning since onset. The nature of his difficulty is maintaining erection. He reports no anxiety. Irritative symptoms do not include frequency. Obstructive symptoms include a slower stream. Pertinent negatives include no dysuria. Past treatments include nothing.         Review of Systems   Constitutional: Negative for fatigue, fever and unexpected weight change.   HENT: Negative for ear pain and sore throat.    Eyes: Negative.  Negative for pain and visual disturbance.   Respiratory: Negative for cough and shortness of breath.    Cardiovascular: Negative for chest pain and palpitations.   Gastrointestinal: Negative for abdominal pain, diarrhea, nausea and vomiting.   Genitourinary: Negative for dysuria and frequency.   Musculoskeletal: Negative for arthralgias and myalgias.   Skin: Negative for color change and rash.   Neurological: Negative for dizziness and headaches.   Psychiatric/Behavioral: Negative for sleep disturbance. The patient is not nervous/anxious.        Vitals:    06/13/19 0803   BP: 120/76   Pulse: 77   Temp: 97.7 °F (36.5 °C)       Objective:     Current Outpatient Medications   Medication Sig Dispense Refill    albuterol (PROVENTIL/VENTOLIN HFA) 90 mcg/actuation inhaler Inhale 2 puffs into the lungs every 6 (six) hours as needed. Rescue      albuterol-ipratropium (DUO-NEB) 2.5 mg-0.5 mg/3 mL nebulizer solution Take 3 mLs by nebulization every 6 (six) hours as needed for  Wheezing. Rescue 120 vial 5    aspirin (ECOTRIN) 81 MG EC tablet Take 81 mg by mouth.      atorvastatin (LIPITOR) 20 MG tablet TAKE 1 TABLET BY MOUTH EVERY DAY 30 tablet 11    chlorzoxazone (PARAFON FORTE) 500 mg Tab Take 500 mg by mouth 3 (three) times daily.  1    clonazePAM (KLONOPIN) 0.5 MG tablet Take 1 tablet (0.5 mg total) by mouth nightly as needed for Anxiety. 90 tablet 1    fluticasone-salmeterol diskus inhaler 250-50 mcg Inhale 1 puff into the lungs 2 (two) times daily. Controller 60 each 11    hydrocodone-acetaminophen 10-325mg (NORCO)  mg Tab Take 1 tablet by mouth.      losartan (COZAAR) 25 MG tablet Take 1 tablet (25 mg total) by mouth once daily. 90 tablet 3    nebulizer and compressor Sushma use as directed 1 each 0    predniSONE (DELTASONE) 10 MG tablet 40 mg PO QD X 3 days then 30 mg PO QD x 3 days then 20 mg PO QD X 3 days then 10 mg PO QD X 3 days then 5 mg PO QD x 3 days then stop. 60 tablet 0    SYMPROIC 0.2 mg Tab       tadalafil (CIALIS) 5 MG tablet Take 1 tablet (5 mg total) by mouth daily as needed for Erectile Dysfunction. 30 tablet 5     No current facility-administered medications for this visit.        Physical Exam   Constitutional: He is oriented to person, place, and time. He appears well-developed. No distress.   HENT:   Head: Normocephalic and atraumatic.   Eyes: Pupils are equal, round, and reactive to light. EOM are normal.   Neck: Normal range of motion. Neck supple.   Cardiovascular: Normal rate and regular rhythm.   Pulmonary/Chest: Effort normal and breath sounds normal.   Musculoskeletal: Normal range of motion.   Neurological: He is alert and oriented to person, place, and time.   Skin: Skin is warm and dry. No rash noted.   Psychiatric: He has a normal mood and affect. Thought content normal.   Nursing note and vitals reviewed.      Assessment:       1. Essential hypertension    2. Fatigue, unspecified type    3. Screening for prostate cancer    4. Erectile  dysfunction, unspecified erectile dysfunction type        Plan:   Essential hypertension  -     Lipid panel; Future; Expected date: 06/13/2019  -     Comprehensive metabolic panel; Future; Expected date: 06/13/2019  -     PSA, Screening; Future; Expected date: 06/13/2019  -     TSH; Future; Expected date: 06/13/2019  -     Vitamin B12; Future; Expected date: 06/13/2019  -     Vitamin D; Future; Expected date: 06/13/2019  -     Testosterone; Future; Expected date: 06/13/2019    Fatigue, unspecified type  -     PSA, Screening; Future; Expected date: 06/13/2019  -     TSH; Future; Expected date: 06/13/2019  -     Vitamin B12; Future; Expected date: 06/13/2019  -     Vitamin D; Future; Expected date: 06/13/2019  -     Testosterone; Future; Expected date: 06/13/2019    Screening for prostate cancer  -     PSA, Screening; Future; Expected date: 06/13/2019    Erectile dysfunction, unspecified erectile dysfunction type    Other orders  -     tadalafil (CIALIS) 5 MG tablet; Take 1 tablet (5 mg total) by mouth daily as needed for Erectile Dysfunction.  Dispense: 30 tablet; Refill: 5        No follow-ups on file.    There are no Patient Instructions on file for this visit.

## 2019-06-14 DIAGNOSIS — E55.9 VITAMIN D DEFICIENCY: Primary | ICD-10-CM

## 2019-06-14 RX ORDER — ERGOCALCIFEROL 1.25 MG/1
50000 CAPSULE ORAL
Qty: 12 CAPSULE | Refills: 3 | Status: SHIPPED | OUTPATIENT
Start: 2019-06-14 | End: 2019-06-20

## 2019-06-20 ENCOUNTER — OFFICE VISIT (OUTPATIENT)
Dept: FAMILY MEDICINE | Facility: CLINIC | Age: 54
End: 2019-06-20
Payer: COMMERCIAL

## 2019-06-20 VITALS
TEMPERATURE: 99 F | SYSTOLIC BLOOD PRESSURE: 116 MMHG | BODY MASS INDEX: 24.39 KG/M2 | WEIGHT: 146.38 LBS | HEART RATE: 92 BPM | DIASTOLIC BLOOD PRESSURE: 73 MMHG | HEIGHT: 65 IN

## 2019-06-20 DIAGNOSIS — A08.4 VIRAL GASTROENTERITIS: Primary | ICD-10-CM

## 2019-06-20 PROCEDURE — 3008F PR BODY MASS INDEX (BMI) DOCUMENTED: ICD-10-PCS | Mod: CPTII,S$GLB,, | Performed by: NURSE PRACTITIONER

## 2019-06-20 PROCEDURE — 3078F PR MOST RECENT DIASTOLIC BLOOD PRESSURE < 80 MM HG: ICD-10-PCS | Mod: CPTII,S$GLB,, | Performed by: NURSE PRACTITIONER

## 2019-06-20 PROCEDURE — 3078F DIAST BP <80 MM HG: CPT | Mod: CPTII,S$GLB,, | Performed by: NURSE PRACTITIONER

## 2019-06-20 PROCEDURE — 3008F BODY MASS INDEX DOCD: CPT | Mod: CPTII,S$GLB,, | Performed by: NURSE PRACTITIONER

## 2019-06-20 PROCEDURE — 99213 OFFICE O/P EST LOW 20 MIN: CPT | Mod: S$GLB,,, | Performed by: NURSE PRACTITIONER

## 2019-06-20 PROCEDURE — 99213 PR OFFICE/OUTPT VISIT, EST, LEVL III, 20-29 MIN: ICD-10-PCS | Mod: S$GLB,,, | Performed by: NURSE PRACTITIONER

## 2019-06-20 PROCEDURE — 3074F PR MOST RECENT SYSTOLIC BLOOD PRESSURE < 130 MM HG: ICD-10-PCS | Mod: CPTII,S$GLB,, | Performed by: NURSE PRACTITIONER

## 2019-06-20 PROCEDURE — 99999 PR PBB SHADOW E&M-EST. PATIENT-LVL III: ICD-10-PCS | Mod: PBBFAC,,, | Performed by: NURSE PRACTITIONER

## 2019-06-20 PROCEDURE — 99999 PR PBB SHADOW E&M-EST. PATIENT-LVL III: CPT | Mod: PBBFAC,,, | Performed by: NURSE PRACTITIONER

## 2019-06-20 PROCEDURE — 3074F SYST BP LT 130 MM HG: CPT | Mod: CPTII,S$GLB,, | Performed by: NURSE PRACTITIONER

## 2019-06-20 RX ORDER — OMEPRAZOLE 40 MG/1
40 CAPSULE, DELAYED RELEASE ORAL DAILY
Qty: 30 CAPSULE | Refills: 2 | Status: SHIPPED | OUTPATIENT
Start: 2019-06-20 | End: 2020-10-30

## 2019-06-20 RX ORDER — ONDANSETRON 8 MG/1
8 TABLET, ORALLY DISINTEGRATING ORAL EVERY 6 HOURS PRN
Qty: 30 TABLET | Refills: 0 | Status: SHIPPED | OUTPATIENT
Start: 2019-06-20 | End: 2021-12-29

## 2019-06-20 NOTE — PROGRESS NOTES
Subjective:       Patient ID: Hector Rao is a 53 y.o. male.    Chief Complaint: Diarrhea; Abdominal Pain; and Nausea    Abdominal Pain   This is a new problem. The current episode started in the past 7 days. The onset quality is sudden. The problem occurs constantly. The problem has been gradually improving. The pain is located in the generalized abdominal region. The pain is mild. The quality of the pain is aching and burning. Associated symptoms include arthralgias, diarrhea and nausea. Pertinent negatives include no dysuria, fever, frequency, headaches, myalgias or vomiting. Associated symptoms comments: heartburn. The pain is aggravated by eating and certain positions. The pain is relieved by nothing. He has tried acetaminophen, antacids and H2 blockers (zofran) for the symptoms. The treatment provided mild relief.       Review of Systems   Constitutional: Positive for activity change, appetite change, chills and fatigue. Negative for fever and unexpected weight change.   HENT: Negative for ear pain and sore throat.    Eyes: Negative.  Negative for pain and visual disturbance.   Respiratory: Negative for cough and shortness of breath.    Cardiovascular: Negative for chest pain and palpitations.   Gastrointestinal: Positive for abdominal pain, diarrhea and nausea. Negative for vomiting.   Genitourinary: Negative for dysuria and frequency.   Musculoskeletal: Positive for arthralgias. Negative for myalgias.   Skin: Negative for color change and rash.   Neurological: Negative for dizziness and headaches.   Psychiatric/Behavioral: Negative for sleep disturbance. The patient is not nervous/anxious.        Vitals:    06/20/19 1025   BP: 116/73   Pulse: 92   Temp: 98.5 °F (36.9 °C)       Objective:     Current Outpatient Medications   Medication Sig Dispense Refill    albuterol (PROVENTIL/VENTOLIN HFA) 90 mcg/actuation inhaler Inhale 2 puffs into the lungs every 6 (six) hours as needed. Rescue       albuterol-ipratropium (DUO-NEB) 2.5 mg-0.5 mg/3 mL nebulizer solution Take 3 mLs by nebulization every 6 (six) hours as needed for Wheezing. Rescue 120 vial 5    aspirin (ECOTRIN) 81 MG EC tablet Take 81 mg by mouth.      atorvastatin (LIPITOR) 20 MG tablet TAKE 1 TABLET BY MOUTH EVERY DAY 30 tablet 11    chlorzoxazone (PARAFON FORTE) 500 mg Tab Take 500 mg by mouth 3 (three) times daily.  1    clonazePAM (KLONOPIN) 0.5 MG tablet Take 1 tablet (0.5 mg total) by mouth nightly as needed for Anxiety. 90 tablet 1    fluticasone-salmeterol diskus inhaler 250-50 mcg Inhale 1 puff into the lungs 2 (two) times daily. Controller 60 each 11    hydrocodone-acetaminophen 10-325mg (NORCO)  mg Tab Take 1 tablet by mouth.      losartan (COZAAR) 25 MG tablet Take 1 tablet (25 mg total) by mouth once daily. 90 tablet 3    nebulizer and compressor Sushma use as directed 1 each 0    predniSONE (DELTASONE) 10 MG tablet 40 mg PO QD X 3 days then 30 mg PO QD x 3 days then 20 mg PO QD X 3 days then 10 mg PO QD X 3 days then 5 mg PO QD x 3 days then stop. 60 tablet 0    SYMPROIC 0.2 mg Tab       tadalafil (CIALIS) 5 MG tablet Take 1 tablet (5 mg total) by mouth daily as needed for Erectile Dysfunction. 30 tablet 5    omeprazole (PRILOSEC) 40 MG capsule Take 1 capsule (40 mg total) by mouth once daily. 30 capsule 2    ondansetron (ZOFRAN-ODT) 8 MG TbDL Take 1 tablet (8 mg total) by mouth every 6 (six) hours as needed. 30 tablet 0     No current facility-administered medications for this visit.        Physical Exam   Constitutional: He is oriented to person, place, and time. He appears well-developed. No distress.   HENT:   Head: Normocephalic and atraumatic.   Right Ear: Tympanic membrane normal.   Left Ear: Tympanic membrane normal.   Nose: Nose normal.   Mouth/Throat: Oropharynx is clear and moist.   Eyes: Pupils are equal, round, and reactive to light. EOM are normal.   Neck: Normal range of motion. Neck supple.    Cardiovascular: Normal rate and regular rhythm.   Pulmonary/Chest: Effort normal and breath sounds normal.   Abdominal: Soft. Normal appearance. Bowel sounds are increased. There is generalized tenderness.   Musculoskeletal: Normal range of motion.   Neurological: He is alert and oriented to person, place, and time.   Skin: Skin is warm and dry. No rash noted.   Psychiatric: He has a normal mood and affect. Thought content normal.   Nursing note and vitals reviewed.      Assessment:       1. Viral gastroenteritis        Plan:   Viral gastroenteritis    Other orders  -     ondansetron (ZOFRAN-ODT) 8 MG TbDL; Take 1 tablet (8 mg total) by mouth every 6 (six) hours as needed.  Dispense: 30 tablet; Refill: 0  -     omeprazole (PRILOSEC) 40 MG capsule; Take 1 capsule (40 mg total) by mouth once daily.  Dispense: 30 capsule; Refill: 2        No follow-ups on file.    There are no Patient Instructions on file for this visit.

## 2019-06-27 DIAGNOSIS — G45.9 TRANSIENT CEREBRAL ISCHEMIA, UNSPECIFIED TYPE: ICD-10-CM

## 2019-06-28 RX ORDER — ATORVASTATIN CALCIUM 20 MG/1
TABLET, FILM COATED ORAL
Qty: 30 TABLET | Refills: 11 | Status: SHIPPED | OUTPATIENT
Start: 2019-06-28 | End: 2020-10-30 | Stop reason: SDUPTHER

## 2019-07-12 ENCOUNTER — TELEPHONE (OUTPATIENT)
Dept: PULMONOLOGY | Facility: CLINIC | Age: 54
End: 2019-07-12

## 2019-07-12 NOTE — TELEPHONE ENCOUNTER
----- Message from Dorina Owen sent at 7/12/2019 10:19 AM CDT -----  Contact: Ms Cerona Sandrit[facility]-[phone]  Would like to consult with the nurse, needs a copy of patient Pulmonary walk Test ,please call back at [phone], thanks sj

## 2019-09-23 DIAGNOSIS — J45.20 INTERMITTENT ASTHMA, UNCOMPLICATED: ICD-10-CM

## 2019-09-23 RX ORDER — ALBUTEROL SULFATE 90 UG/1
AEROSOL, METERED RESPIRATORY (INHALATION)
Qty: 54 G | Refills: 11 | Status: SHIPPED | OUTPATIENT
Start: 2019-09-23 | End: 2020-10-30 | Stop reason: SDUPTHER

## 2019-10-08 ENCOUNTER — OFFICE VISIT (OUTPATIENT)
Dept: FAMILY MEDICINE | Facility: CLINIC | Age: 54
End: 2019-10-08

## 2019-10-08 VITALS — BODY MASS INDEX: 24.43 KG/M2 | TEMPERATURE: 99 F | WEIGHT: 152 LBS | HEIGHT: 66 IN

## 2019-10-08 DIAGNOSIS — J40 BRONCHITIS: Primary | ICD-10-CM

## 2019-10-08 DIAGNOSIS — R05.9 COUGH: ICD-10-CM

## 2019-10-08 DIAGNOSIS — R52 BODY ACHES: ICD-10-CM

## 2019-10-08 LAB
INFLUENZA A, MOLECULAR: NEGATIVE
INFLUENZA B, MOLECULAR: NEGATIVE
SPECIMEN SOURCE: NORMAL

## 2019-10-08 PROCEDURE — 87502 INFLUENZA DNA AMP PROBE: CPT | Mod: PO

## 2019-10-08 PROCEDURE — 99999 PR PBB SHADOW E&M-EST. PATIENT-LVL IV: ICD-10-PCS | Mod: PBBFAC,,, | Performed by: NURSE PRACTITIONER

## 2019-10-08 PROCEDURE — 99214 OFFICE O/P EST MOD 30 MIN: CPT | Mod: PBBFAC,PO | Performed by: NURSE PRACTITIONER

## 2019-10-08 PROCEDURE — 99213 PR OFFICE/OUTPT VISIT, EST, LEVL III, 20-29 MIN: ICD-10-PCS | Mod: S$PBB,,, | Performed by: NURSE PRACTITIONER

## 2019-10-08 PROCEDURE — 96372 THER/PROPH/DIAG INJ SC/IM: CPT | Mod: PBBFAC,PO

## 2019-10-08 PROCEDURE — 99213 OFFICE O/P EST LOW 20 MIN: CPT | Mod: S$PBB,,, | Performed by: NURSE PRACTITIONER

## 2019-10-08 PROCEDURE — 99999 PR PBB SHADOW E&M-EST. PATIENT-LVL IV: CPT | Mod: PBBFAC,,, | Performed by: NURSE PRACTITIONER

## 2019-10-08 RX ORDER — PROMETHAZINE HYDROCHLORIDE AND DEXTROMETHORPHAN HYDROBROMIDE 6.25; 15 MG/5ML; MG/5ML
5 SYRUP ORAL 2 TIMES DAILY PRN
Qty: 118 ML | Refills: 0 | Status: SHIPPED | OUTPATIENT
Start: 2019-10-08 | End: 2019-10-18

## 2019-10-08 RX ORDER — CEFDINIR 300 MG/1
300 CAPSULE ORAL 2 TIMES DAILY
Qty: 20 CAPSULE | Refills: 0 | Status: SHIPPED | OUTPATIENT
Start: 2019-10-08 | End: 2019-10-08

## 2019-10-08 RX ORDER — AZELASTINE 1 MG/ML
1 SPRAY, METERED NASAL 2 TIMES DAILY
Qty: 30 ML | Refills: 0 | Status: SHIPPED | OUTPATIENT
Start: 2019-10-08 | End: 2020-10-30

## 2019-10-08 RX ORDER — DEXAMETHASONE SODIUM PHOSPHATE 4 MG/ML
8 INJECTION, SOLUTION INTRA-ARTICULAR; INTRALESIONAL; INTRAMUSCULAR; INTRAVENOUS; SOFT TISSUE
Status: COMPLETED | OUTPATIENT
Start: 2019-10-08 | End: 2019-10-08

## 2019-10-08 RX ORDER — CEFTRIAXONE 1 G/1
1 INJECTION, POWDER, FOR SOLUTION INTRAMUSCULAR; INTRAVENOUS
Status: COMPLETED | OUTPATIENT
Start: 2019-10-08 | End: 2019-10-08

## 2019-10-08 RX ADMIN — DEXAMETHASONE SODIUM PHOSPHATE 8 MG: 4 INJECTION, SOLUTION INTRAMUSCULAR; INTRAVENOUS at 12:10

## 2019-10-08 RX ADMIN — CEFTRIAXONE SODIUM 1 G: 1 INJECTION, POWDER, FOR SOLUTION INTRAMUSCULAR; INTRAVENOUS at 12:10

## 2019-10-08 NOTE — PROGRESS NOTES
Subjective:       Patient ID: Hector Rao is a 54 y.o. male.    Chief Complaint: Nasal Congestion; Cough; and Sinusitis    Cough   This is a new problem. The current episode started 1 to 4 weeks ago. The problem has been unchanged. The problem occurs every few minutes. The cough is productive of sputum. Associated symptoms include nasal congestion, postnasal drip, rhinorrhea, a sore throat, shortness of breath and wheezing. Pertinent negatives include no chest pain, chills, ear congestion, ear pain, fever, headaches, heartburn, hemoptysis, myalgias, rash, sweats or weight loss. Nothing aggravates the symptoms. He has tried a beta-agonist inhaler and steroid inhaler for the symptoms. The treatment provided no relief. His past medical history is significant for bronchitis and COPD. There is no history of asthma, bronchiectasis, emphysema, environmental allergies or pneumonia.     Past Medical History:   Diagnosis Date    Anxiety     Asthma, acute     Hyperlipidemia 7/31/2017    Joint pain     TIA (transient ischemic attack) 7/31/2017    TMJ (dislocation of temporomandibular joint)      Social History     Socioeconomic History    Marital status:      Spouse name: Myrtle    Number of children: 2    Years of education: Not on file    Highest education level: Not on file   Occupational History    Occupation: Electronic Tech     Employer: [employer]     Employer:  [employer]   Social Needs    Financial resource strain: Not on file    Food insecurity:     Worry: Not on file     Inability: Not on file    Transportation needs:     Medical: Not on file     Non-medical: Not on file   Tobacco Use    Smoking status: Current Every Day Smoker     Packs/day: 2.00     Years: 41.00     Pack years: 82.00     Types: Cigarettes     Start date: 1970    Smokeless tobacco: Never Used   Substance and Sexual Activity    Alcohol use: Yes     Alcohol/week: 2.0 standard drinks     Types: 2 Cans of beer  per week     Comment: He is a reformed alcoholic/ 2 beers per week    Drug use: Yes     Comment: He used to use marijuana.    Sexual activity: Yes     Partners: Female   Lifestyle    Physical activity:     Days per week: Not on file     Minutes per session: Not on file    Stress: Not on file   Relationships    Social connections:     Talks on phone: Not on file     Gets together: Not on file     Attends Sabianist service: Not on file     Active member of club or organization: Not on file     Attends meetings of clubs or organizations: Not on file     Relationship status: Not on file   Other Topics Concern    Not on file   Social History Narrative    Not on file     Past Surgical History:   Procedure Laterality Date    KNEE SURGERY      left side       Review of Systems   Constitutional: Negative.  Negative for chills, fever and weight loss.   HENT: Positive for postnasal drip, rhinorrhea, sinus pressure and sore throat. Negative for ear pain.    Eyes: Negative.    Respiratory: Positive for cough, shortness of breath and wheezing. Negative for hemoptysis.    Cardiovascular: Negative.  Negative for chest pain.   Gastrointestinal: Negative.  Negative for heartburn.   Endocrine: Negative.    Genitourinary: Negative.    Musculoskeletal: Negative.  Negative for myalgias.   Skin: Negative.  Negative for rash.   Allergic/Immunologic: Negative.  Negative for environmental allergies.   Neurological: Negative.  Negative for headaches.   Psychiatric/Behavioral: Negative.        Objective:      Physical Exam   Constitutional: He is oriented to person, place, and time. He appears well-developed and well-nourished.   HENT:   Head: Normocephalic.   Right Ear: External ear normal.   Left Ear: External ear normal.   Mouth/Throat: Oropharynx is clear and moist.   Eyes: Pupils are equal, round, and reactive to light. Conjunctivae are normal.   Neck: Normal range of motion. Neck supple.   Cardiovascular: Normal rate, regular  rhythm and normal heart sounds.   Pulmonary/Chest: Effort normal. He has wheezes in the right upper field, the right lower field, the left upper field and the left lower field.   Abdominal: Soft. Bowel sounds are normal.   Musculoskeletal: Normal range of motion.   Neurological: He is alert and oriented to person, place, and time.   Skin: Skin is warm and dry. Capillary refill takes 2 to 3 seconds.   Psychiatric: He has a normal mood and affect. His behavior is normal. Judgment and thought content normal.   Nursing note and vitals reviewed.      Assessment:       1. Bronchitis    2. Cough    3. Body aches        Plan:           Hector was seen today for nasal congestion, cough and sinusitis.    Diagnoses and all orders for this visit:    Bronchitis  Cough  Body aches  -     Influenza A & B by Molecular  -     dexamethasone injection 8 mg  -     promethazine-dextromethorphan (PROMETHAZINE-DM) 6.25-15 mg/5 mL Syrp; Take 5 mLs by mouth 2 (two) times daily as needed.  -     azelastine (ASTELIN) 137 mcg (0.1 %) nasal spray; 1 spray (137 mcg total) by Nasal route 2 (two) times daily. for 7 days  -     cefTRIAXone injection 1 g  Hydrate well  Inhalers as prescribed  Report to ER immediately if symptoms worsen

## 2019-10-16 ENCOUNTER — TELEPHONE (OUTPATIENT)
Dept: FAMILY MEDICINE | Facility: CLINIC | Age: 54
End: 2019-10-16

## 2019-10-16 NOTE — TELEPHONE ENCOUNTER
----- Message from Bessie Ann sent at 10/16/2019 10:29 AM CDT -----  Contact: Yaya cheek of [facility]  Caller called in regards to records for pt. Caller can be reached [phone].

## 2019-10-24 DIAGNOSIS — F41.9 ANXIETY: ICD-10-CM

## 2019-10-24 RX ORDER — CLONAZEPAM 0.5 MG/1
TABLET ORAL
Qty: 90 TABLET | Refills: 1 | OUTPATIENT
Start: 2019-10-24

## 2020-04-03 DIAGNOSIS — I10 ESSENTIAL HYPERTENSION: ICD-10-CM

## 2020-04-03 RX ORDER — LOSARTAN POTASSIUM 25 MG/1
25 TABLET ORAL DAILY
Qty: 90 TABLET | Refills: 0 | Status: SHIPPED | OUTPATIENT
Start: 2020-04-03 | End: 2020-06-26 | Stop reason: SDUPTHER

## 2020-05-28 RX ORDER — FLUTICASONE PROPIONATE AND SALMETEROL 250; 50 UG/1; UG/1
POWDER RESPIRATORY (INHALATION)
Qty: 60 EACH | Refills: 11 | Status: SHIPPED | OUTPATIENT
Start: 2020-05-28 | End: 2020-10-30 | Stop reason: SDUPTHER

## 2020-08-28 DIAGNOSIS — I10 ESSENTIAL HYPERTENSION: ICD-10-CM

## 2020-08-28 RX ORDER — LOSARTAN POTASSIUM 25 MG/1
25 TABLET ORAL DAILY
Qty: 30 TABLET | Refills: 0 | Status: SHIPPED | OUTPATIENT
Start: 2020-08-28 | End: 2020-09-10

## 2020-09-01 ENCOUNTER — HOSPITAL ENCOUNTER (OUTPATIENT)
Dept: RADIOLOGY | Facility: HOSPITAL | Age: 55
Discharge: HOME | End: 2020-09-01
Attending: INTERNAL MEDICINE
Payer: COMMERCIAL

## 2020-09-01 DIAGNOSIS — Z12.9 SCREENING FOR CANCER: ICD-10-CM

## 2020-09-01 PROCEDURE — G0297 CT CHEST LUNG SCREENING LOW DOSE: ICD-10-PCS | Mod: 26,,, | Performed by: RADIOLOGY

## 2020-09-01 PROCEDURE — G0297 LDCT FOR LUNG CA SCREEN: HCPCS | Mod: 26,,, | Performed by: RADIOLOGY

## 2020-09-01 PROCEDURE — G0297 LDCT FOR LUNG CA SCREEN: HCPCS | Mod: TC

## 2020-09-04 DIAGNOSIS — J44.89 ASTHMA WITH COPD: Primary | ICD-10-CM

## 2020-09-10 ENCOUNTER — OFFICE VISIT (OUTPATIENT)
Dept: FAMILY MEDICINE | Facility: CLINIC | Age: 55
End: 2020-09-10
Payer: COMMERCIAL

## 2020-09-10 VITALS
HEIGHT: 65 IN | BODY MASS INDEX: 25.99 KG/M2 | SYSTOLIC BLOOD PRESSURE: 126 MMHG | RESPIRATION RATE: 18 BRPM | WEIGHT: 156 LBS | TEMPERATURE: 99 F | DIASTOLIC BLOOD PRESSURE: 77 MMHG | HEART RATE: 68 BPM

## 2020-09-10 DIAGNOSIS — I10 ESSENTIAL HYPERTENSION: Primary | ICD-10-CM

## 2020-09-10 PROCEDURE — 99999 PR PBB SHADOW E&M-EST. PATIENT-LVL III: CPT | Mod: PBBFAC,,, | Performed by: FAMILY MEDICINE

## 2020-09-10 PROCEDURE — 3074F PR MOST RECENT SYSTOLIC BLOOD PRESSURE < 130 MM HG: ICD-10-PCS | Mod: CPTII,S$GLB,, | Performed by: FAMILY MEDICINE

## 2020-09-10 PROCEDURE — 99999 PR PBB SHADOW E&M-EST. PATIENT-LVL III: ICD-10-PCS | Mod: PBBFAC,,, | Performed by: FAMILY MEDICINE

## 2020-09-10 PROCEDURE — 99213 PR OFFICE/OUTPT VISIT, EST, LEVL III, 20-29 MIN: ICD-10-PCS | Mod: S$GLB,,, | Performed by: FAMILY MEDICINE

## 2020-09-10 PROCEDURE — 3074F SYST BP LT 130 MM HG: CPT | Mod: CPTII,S$GLB,, | Performed by: FAMILY MEDICINE

## 2020-09-10 PROCEDURE — 3078F PR MOST RECENT DIASTOLIC BLOOD PRESSURE < 80 MM HG: ICD-10-PCS | Mod: CPTII,S$GLB,, | Performed by: FAMILY MEDICINE

## 2020-09-10 PROCEDURE — 99213 OFFICE O/P EST LOW 20 MIN: CPT | Mod: S$GLB,,, | Performed by: FAMILY MEDICINE

## 2020-09-10 PROCEDURE — 3008F BODY MASS INDEX DOCD: CPT | Mod: CPTII,S$GLB,, | Performed by: FAMILY MEDICINE

## 2020-09-10 PROCEDURE — 3008F PR BODY MASS INDEX (BMI) DOCUMENTED: ICD-10-PCS | Mod: CPTII,S$GLB,, | Performed by: FAMILY MEDICINE

## 2020-09-10 PROCEDURE — 3078F DIAST BP <80 MM HG: CPT | Mod: CPTII,S$GLB,, | Performed by: FAMILY MEDICINE

## 2020-09-10 NOTE — PROGRESS NOTES
The patient presents today tofu domo el BP yesterday 180/120 but lower today. Had been on losartan 25 wo side effects.    Past Medical History:  Past Medical History:   Diagnosis Date    Anxiety     Asthma, acute     Hyperlipidemia 7/31/2017    Joint pain     TIA (transient ischemic attack) 7/31/2017    TMJ (dislocation of temporomandibular joint)      Past Surgical History:   Procedure Laterality Date    KNEE SURGERY      left side     Review of patient's allergies indicates:   Allergen Reactions    Codeine Nausea And Vomiting     Other reaction(s): Nausea    Lexapro [escitalopram oxalate]      Erectile dysfunction.     Current Outpatient Medications on File Prior to Visit   Medication Sig Dispense Refill    aspirin (ECOTRIN) 81 MG EC tablet Take 81 mg by mouth.      atorvastatin (LIPITOR) 20 MG tablet TAKE 1 TABLET BY MOUTH EVERY DAY 30 tablet 11    azelastine (ASTELIN) 137 mcg (0.1 %) nasal spray 1 spray (137 mcg total) by Nasal route 2 (two) times daily. for 7 days 30 mL 0    chlorzoxazone (PARAFON FORTE) 500 mg Tab Take 500 mg by mouth 3 (three) times daily.  1    clonazePAM (KLONOPIN) 0.5 MG tablet Take 1 tablet (0.5 mg total) by mouth nightly as needed for Anxiety. 90 tablet 1    hydrocodone-acetaminophen 10-325mg (NORCO)  mg Tab Take 1 tablet by mouth.      losartan (COZAAR) 25 MG tablet TAKE 1 TABLET (25 MG TOTAL) BY MOUTH ONCE DAILY.  30 tablet 0    nebulizer and compressor Sushma use as directed 1 each 0    ondansetron (ZOFRAN-ODT) 8 MG TbDL Take 1 tablet (8 mg total) by mouth every 6 (six) hours as needed. 30 tablet 0    SYMPROIC 0.2 mg Tab       VENTOLIN HFA 90 mcg/actuation inhaler INHALE 2 PUFFS BY MOUTH EVERY 6 HOURS AS NEEDED FOR WHEEZING. 54 g 11    WIXELA INHUB 250-50 mcg/dose diskus inhaler INHALE 1 PUFF INTO THE LUNGS 2 (TWO) TIMES DAILY. CONTROLLER 60 each 11    albuterol-ipratropium (DUO-NEB) 2.5 mg-0.5 mg/3 mL nebulizer solution Take 3 mLs by nebulization every 6  (six) hours as needed for Wheezing. Rescue 120 vial 5    omeprazole (PRILOSEC) 40 MG capsule Take 1 capsule (40 mg total) by mouth once daily. 30 capsule 2    tadalafil (CIALIS) 5 MG tablet Take 1 tablet (5 mg total) by mouth daily as needed for Erectile Dysfunction. 30 tablet 5    [DISCONTINUED] losartan (COZAAR) 25 MG tablet TAKE 1 TABLET (25 MG TOTAL) BY MOUTH ONCE DAILY. 90 tablet 0     No current facility-administered medications on file prior to visit.      Social History     Socioeconomic History    Marital status:      Spouse name: Myrtle    Number of children: 2    Years of education: Not on file    Highest education level: Not on file   Occupational History    Occupation: Electronic Tech     Employer: [employer]     Employer:  [employer]   Social Needs    Financial resource strain: Not on file    Food insecurity     Worry: Not on file     Inability: Not on file    Transportation needs     Medical: Not on file     Non-medical: Not on file   Tobacco Use    Smoking status: Current Every Day Smoker     Packs/day: 2.00     Years: 41.00     Pack years: 82.00     Types: Cigarettes     Start date: 1970    Smokeless tobacco: Never Used   Substance and Sexual Activity    Alcohol use: Yes     Alcohol/week: 2.0 standard drinks     Types: 2 Cans of beer per week     Comment: He is a reformed alcoholic/ 2 beers per week    Drug use: Yes     Comment: He used to use marijuana.    Sexual activity: Yes     Partners: Female   Lifestyle    Physical activity     Days per week: Not on file     Minutes per session: Not on file    Stress: Not on file   Relationships    Social connections     Talks on phone: Not on file     Gets together: Not on file     Attends Cheondoism service: Not on file     Active member of club or organization: Not on file     Attends meetings of clubs or organizations: Not on file     Relationship status: Not on file   Other Topics Concern    Not on file   Social  History Narrative    Not on file     Family History   Problem Relation Age of Onset    Stroke Mother     Hypertension Mother     Stroke Father     Hypertension Father     Stroke Unknown         grandmother/grandfather         ROS:GENERAL: No fever, chills, fatigability or weight loss.  SKIN: No rashes, itching or changes in color or texture of skin.  HEAD: No headaches or recent head trauma.EYES: Visual acuity fine. No photophobia, ocular pain or diplopia.EARS: Denies ear pain, discharge or vertigo.NOSE: No loss of smell, no epistaxis or postnasal drip.MOUTH & THROAT: No hoarseness or change in voice. No excessive gum bleeding.NODES: Denies swollen glands.  CHEST: Denies LUONG, cyanosis, wheezing, cough and sputum production.  CARDIOVASCULAR: Denies chest pain, PND, orthopnea or reduced exercise tolerance.  ABDOMEN: Appetite fine. No weight loss. Denies diarrhea, abdominal pain, hematemesis or blood in stool.  URINARY: No flank pain, dysuria or hematuria.  PERIPHERAL VASCULAR: No claudication or cyanosis.  MUSCULOSKELETAL: See above.  NEUROLOGIC: No history of seizures, paralysis, alteration of gait or coordination.  PE:    HEAD: Normocephalic, atraumatic.EYES: PERRL. EOMI.   EARS: TM's intact. Light reflex normal. No retraction or perforation.   NOSE: Mucosa pink. Airway clear.MOUTH & THROAT: No tonsillar enlargement. No pharyngeal erythema or exudate. No stridor.  NODES: No cervical, axillary or inguinal lymph node enlargement.  CHEST: Lungs clear to auscultation.  CARDIOVASCULAR: Normal S1, S2. No rubs, murmurs or gallops.  ABDOMEN: Bowel sounds normal. Not distended. Soft. No tenderness or masses.  MUSCULOSKELETAL: No palpable abnormality  NEUROLOGIC: Cranial Nerves: II-XII grossly intact.  Motor: 5/5 strength major flexors/extensors.  DTR's: Knees, Ankles 2+ and equal bilaterally; downgoing toes.  Sensory: Intact to light touch distally.  Gait & Posture: Normal gait and fine motion. No cerebellar signs.      Impression:HBP not controlled   Plan Losartan incr prn and careful fu BP

## 2020-09-10 NOTE — LETTER
September 10, 2020      [facility]  [address] [address]  Phone: [phone]  Fax: [phone]       Patient: Hector Rao   YOB: 1965  Date of Visit: 09/10/2020    To Whom It May Concern:    Edd Rao  was at [facility] on 09/09/2020. He may return to work/school on 9/14/2020 with no restrictions. If you have any questions or concerns, or if I can be of further assistance, please do not hesitate to contact me.    Sincerely,    Marta Sauceda MA

## 2020-10-08 DIAGNOSIS — I10 ESSENTIAL HYPERTENSION: ICD-10-CM

## 2020-10-08 NOTE — TELEPHONE ENCOUNTER
He is requesting losartan and his med card does not have that on it.    He appears to be due for labs etcetera and I do not think has had a physical in a year.  Please book with Shelly if not

## 2020-10-09 RX ORDER — LOSARTAN POTASSIUM 25 MG/1
25 TABLET ORAL DAILY
Qty: 30 TABLET | Refills: 0 | Status: SHIPPED | OUTPATIENT
Start: 2020-10-09 | End: 2020-10-28

## 2020-10-09 NOTE — TELEPHONE ENCOUNTER
The patient requested medicine refills and I did refill it once.    Health Maintenance Due   Topic Date Due    HIV Screening  07/05/1978    Shingles Vaccine (1 of 2) 07/05/2013    Colorectal Cancer Screening  03/17/2020    Lipid Panel  06/13/2020    Influenza Vaccine (1) 08/01/2020     BP Readings from Last 3 Encounters:   09/10/20 126/77   06/20/19 116/73   06/13/19 120/76

## 2020-10-28 DIAGNOSIS — I10 ESSENTIAL HYPERTENSION: ICD-10-CM

## 2020-10-28 RX ORDER — LOSARTAN POTASSIUM 25 MG/1
25 TABLET ORAL DAILY
Qty: 30 TABLET | Refills: 11 | Status: SHIPPED | OUTPATIENT
Start: 2020-10-28 | End: 2020-10-30 | Stop reason: SDUPTHER

## 2020-10-30 ENCOUNTER — PATIENT MESSAGE (OUTPATIENT)
Dept: FAMILY MEDICINE | Facility: CLINIC | Age: 55
End: 2020-10-30

## 2020-10-30 ENCOUNTER — OFFICE VISIT (OUTPATIENT)
Dept: FAMILY MEDICINE | Facility: CLINIC | Age: 55
End: 2020-10-30
Payer: COMMERCIAL

## 2020-10-30 VITALS
TEMPERATURE: 98 F | HEART RATE: 80 BPM | DIASTOLIC BLOOD PRESSURE: 82 MMHG | HEIGHT: 66 IN | SYSTOLIC BLOOD PRESSURE: 130 MMHG | RESPIRATION RATE: 20 BRPM | WEIGHT: 159 LBS | BODY MASS INDEX: 25.55 KG/M2

## 2020-10-30 DIAGNOSIS — J45.21 MILD INTERMITTENT ASTHMA WITH ACUTE EXACERBATION: Chronic | ICD-10-CM

## 2020-10-30 DIAGNOSIS — Z00.00 ANNUAL PHYSICAL EXAM: Primary | ICD-10-CM

## 2020-10-30 DIAGNOSIS — Z12.5 ENCOUNTER FOR PROSTATE CANCER SCREENING: ICD-10-CM

## 2020-10-30 DIAGNOSIS — G45.9 TIA (TRANSIENT ISCHEMIC ATTACK): ICD-10-CM

## 2020-10-30 DIAGNOSIS — Z86.010 HISTORY OF COLON POLYPS: ICD-10-CM

## 2020-10-30 DIAGNOSIS — H81.02 MENIERE'S DISEASE OF LEFT EAR: ICD-10-CM

## 2020-10-30 DIAGNOSIS — Z23 IMMUNIZATION DUE: ICD-10-CM

## 2020-10-30 DIAGNOSIS — F17.210 NICOTINE DEPENDENCE, CIGARETTES, UNCOMPLICATED: ICD-10-CM

## 2020-10-30 DIAGNOSIS — F41.9 ANXIETY: ICD-10-CM

## 2020-10-30 DIAGNOSIS — Z11.4 ENCOUNTER FOR SCREENING FOR HIV: ICD-10-CM

## 2020-10-30 DIAGNOSIS — N52.9 ERECTILE DYSFUNCTION, UNSPECIFIED ERECTILE DYSFUNCTION TYPE: ICD-10-CM

## 2020-10-30 DIAGNOSIS — Z12.11 COLON CANCER SCREENING: ICD-10-CM

## 2020-10-30 DIAGNOSIS — K21.9 GASTROESOPHAGEAL REFLUX DISEASE, UNSPECIFIED WHETHER ESOPHAGITIS PRESENT: ICD-10-CM

## 2020-10-30 DIAGNOSIS — J44.89 ASTHMA WITH COPD: Chronic | ICD-10-CM

## 2020-10-30 DIAGNOSIS — J45.20 INTERMITTENT ASTHMA, UNCOMPLICATED: ICD-10-CM

## 2020-10-30 DIAGNOSIS — G45.9 TRANSIENT CEREBRAL ISCHEMIA, UNSPECIFIED TYPE: ICD-10-CM

## 2020-10-30 DIAGNOSIS — Z01.818 PREOP EXAMINATION: ICD-10-CM

## 2020-10-30 DIAGNOSIS — E78.5 HYPERLIPIDEMIA, UNSPECIFIED HYPERLIPIDEMIA TYPE: ICD-10-CM

## 2020-10-30 DIAGNOSIS — R53.83 OTHER FATIGUE: ICD-10-CM

## 2020-10-30 DIAGNOSIS — I10 ESSENTIAL HYPERTENSION: ICD-10-CM

## 2020-10-30 LAB — SARS-COV-2 RNA RESP QL NAA+PROBE: NOT DETECTED

## 2020-10-30 PROCEDURE — 3075F SYST BP GE 130 - 139MM HG: CPT | Mod: CPTII,S$GLB,, | Performed by: FAMILY MEDICINE

## 2020-10-30 PROCEDURE — 3008F BODY MASS INDEX DOCD: CPT | Mod: CPTII,S$GLB,, | Performed by: FAMILY MEDICINE

## 2020-10-30 PROCEDURE — 3075F PR MOST RECENT SYSTOLIC BLOOD PRESS GE 130-139MM HG: ICD-10-PCS | Mod: CPTII,S$GLB,, | Performed by: FAMILY MEDICINE

## 2020-10-30 PROCEDURE — 90471 IMMUNIZATION ADMIN: CPT | Mod: S$GLB,,, | Performed by: FAMILY MEDICINE

## 2020-10-30 PROCEDURE — 90686 FLU VACCINE (QUAD) GREATER THAN OR EQUAL TO 3YO PRESERVATIVE FREE IM: ICD-10-PCS | Mod: S$GLB,,, | Performed by: FAMILY MEDICINE

## 2020-10-30 PROCEDURE — 99999 PR PBB SHADOW E&M-EST. PATIENT-LVL III: ICD-10-PCS | Mod: PBBFAC,,, | Performed by: FAMILY MEDICINE

## 2020-10-30 PROCEDURE — 90471 FLU VACCINE (QUAD) GREATER THAN OR EQUAL TO 3YO PRESERVATIVE FREE IM: ICD-10-PCS | Mod: S$GLB,,, | Performed by: FAMILY MEDICINE

## 2020-10-30 PROCEDURE — U0003 INFECTIOUS AGENT DETECTION BY NUCLEIC ACID (DNA OR RNA); SEVERE ACUTE RESPIRATORY SYNDROME CORONAVIRUS 2 (SARS-COV-2) (CORONAVIRUS DISEASE [COVID-19]), AMPLIFIED PROBE TECHNIQUE, MAKING USE OF HIGH THROUGHPUT TECHNOLOGIES AS DESCRIBED BY CMS-2020-01-R: HCPCS

## 2020-10-30 PROCEDURE — 99396 PR PREVENTIVE VISIT,EST,40-64: ICD-10-PCS | Mod: 25,S$GLB,, | Performed by: FAMILY MEDICINE

## 2020-10-30 PROCEDURE — 99999 PR PBB SHADOW E&M-EST. PATIENT-LVL III: CPT | Mod: PBBFAC,,, | Performed by: FAMILY MEDICINE

## 2020-10-30 PROCEDURE — 3079F PR MOST RECENT DIASTOLIC BLOOD PRESSURE 80-89 MM HG: ICD-10-PCS | Mod: CPTII,S$GLB,, | Performed by: FAMILY MEDICINE

## 2020-10-30 PROCEDURE — 99396 PREV VISIT EST AGE 40-64: CPT | Mod: 25,S$GLB,, | Performed by: FAMILY MEDICINE

## 2020-10-30 PROCEDURE — 3008F PR BODY MASS INDEX (BMI) DOCUMENTED: ICD-10-PCS | Mod: CPTII,S$GLB,, | Performed by: FAMILY MEDICINE

## 2020-10-30 PROCEDURE — 90686 IIV4 VACC NO PRSV 0.5 ML IM: CPT | Mod: S$GLB,,, | Performed by: FAMILY MEDICINE

## 2020-10-30 PROCEDURE — 3079F DIAST BP 80-89 MM HG: CPT | Mod: CPTII,S$GLB,, | Performed by: FAMILY MEDICINE

## 2020-10-30 RX ORDER — AZELASTINE 1 MG/ML
1 SPRAY, METERED NASAL 2 TIMES DAILY
Qty: 30 ML | Refills: 11 | Status: CANCELLED | OUTPATIENT
Start: 2020-10-30 | End: 2020-11-06

## 2020-10-30 RX ORDER — ALBUTEROL SULFATE 90 UG/1
2 AEROSOL, METERED RESPIRATORY (INHALATION) EVERY 6 HOURS PRN
Qty: 54 G | Refills: 11 | Status: SHIPPED | OUTPATIENT
Start: 2020-10-30 | End: 2021-12-29 | Stop reason: SDUPTHER

## 2020-10-30 RX ORDER — ONDANSETRON 8 MG/1
8 TABLET, ORALLY DISINTEGRATING ORAL EVERY 6 HOURS PRN
Qty: 30 TABLET | Refills: 0 | Status: CANCELLED | OUTPATIENT
Start: 2020-10-30

## 2020-10-30 RX ORDER — SODIUM, POTASSIUM,MAG SULFATES 17.5-3.13G
SOLUTION, RECONSTITUTED, ORAL ORAL
Qty: 354 ML | Refills: 0 | Status: CANCELLED | OUTPATIENT
Start: 2020-10-30

## 2020-10-30 RX ORDER — TADALAFIL 5 MG/1
5 TABLET ORAL DAILY PRN
Qty: 30 TABLET | Refills: 5 | Status: CANCELLED | OUTPATIENT
Start: 2020-10-30 | End: 2021-10-30

## 2020-10-30 RX ORDER — CLONAZEPAM 0.5 MG/1
0.5 TABLET ORAL NIGHTLY PRN
Qty: 90 TABLET | Refills: 1 | Status: CANCELLED | OUTPATIENT
Start: 2020-10-30

## 2020-10-30 RX ORDER — LOSARTAN POTASSIUM 25 MG/1
25 TABLET ORAL DAILY
Qty: 30 TABLET | Refills: 11 | Status: SHIPPED | OUTPATIENT
Start: 2020-10-30 | End: 2021-11-15 | Stop reason: SDUPTHER

## 2020-10-30 RX ORDER — ASPIRIN 81 MG/1
81 TABLET ORAL DAILY
Qty: 100 TABLET | Refills: 3 | Status: SHIPPED | OUTPATIENT
Start: 2020-10-30 | End: 2023-12-19

## 2020-10-30 RX ORDER — IPRATROPIUM BROMIDE AND ALBUTEROL SULFATE 2.5; .5 MG/3ML; MG/3ML
SOLUTION RESPIRATORY (INHALATION)
Qty: 360 ML | Refills: 11 | Status: SHIPPED | OUTPATIENT
Start: 2020-10-30 | End: 2021-12-29 | Stop reason: SDUPTHER

## 2020-10-30 RX ORDER — ATORVASTATIN CALCIUM 20 MG/1
20 TABLET, FILM COATED ORAL DAILY
Qty: 30 TABLET | Refills: 11 | Status: SHIPPED | OUTPATIENT
Start: 2020-10-30 | End: 2021-12-29 | Stop reason: SDUPTHER

## 2020-10-30 RX ORDER — FLUTICASONE PROPIONATE AND SALMETEROL 250; 50 UG/1; UG/1
1 POWDER RESPIRATORY (INHALATION) 2 TIMES DAILY
Qty: 60 EACH | Refills: 11 | Status: SHIPPED | OUTPATIENT
Start: 2020-10-30 | End: 2021-12-29

## 2020-10-30 RX ORDER — OMEPRAZOLE 40 MG/1
40 CAPSULE, DELAYED RELEASE ORAL DAILY
Qty: 30 CAPSULE | Refills: 2 | Status: CANCELLED | OUTPATIENT
Start: 2020-10-30 | End: 2021-10-30

## 2020-10-30 NOTE — LETTER
October 30, 2020    Hector Rao  [address] [address] [address]         [facility]  [address] [address] [place] [address]  Phone: [phone]  Fax: [phone] October 30, 2020     Patient: Hector Rao   YOB: 1965   Date of Visit: 10/30/2020       To Whom It May Concern:    It is my medical opinion that Hector Rao should be excused from 10/28/2020-10/30/2020 and return to work on 10/31/2020.    If you have any questions or concerns, please don't hesitate to call.    Sincerely,          Jerome Arnold MD

## 2020-10-30 NOTE — PROGRESS NOTES
Subjective:      Patient ID: Hector Rao is a 55 y.o. male.    Chief Complaint: Sore Throat, Dizziness, Fatigue, and Stress    Problem List Items Addressed This Visit     Asthma with COPD / Emphysema (Chronic)    Anxiety    HTN (hypertension)    Overview     The patient presents with essential hypertension.  The patient is tolerating the medication well and is in excellent compliance.  The patient is experiencing no side effects.  Counseling was offered regarding low salt diets.  The patient has a reduced salt intake.  He has chronic dizziness and he has had a workup for this.  His mother had a tumor causing her bp to be high and he has had variability of his bp and he is requesting testing for this.  Hypertension Medications             losartan (COZAAR) 25 MG tablet TAKE 1 TABLET (25 MG TOTAL) BY MOUTH ONCE DAILY.                      Colon cancer screening    Nicotine dependence, cigarettes, uncomplicated    TIA (transient ischemic attack)    Hyperlipidemia    Meniere's disease of left ear    Overview     He has chronic vertigo with a diagnosis of meniere's disease. He has had a large workup with an ENT at [place] in the past.           Other fatigue    Overview     He has had fatigue and he has had feverish feelings without fever for over a week.  His job is asking him to be tested for covid.  He has not had cough or SOB noted.  He has no pain as far as body aches like he has covid.             Other Visit Diagnoses     Annual physical exam    -  Primary    Intermittent asthma, uncomplicated        Mild intermittent asthma with acute exacerbation  (Chronic)  (Active)      Transient cerebral ischemia, unspecified type        Immunization due        Encounter for screening for HIV        Encounter for prostate cancer screening        Erectile dysfunction, unspecified erectile dysfunction type        Gastroesophageal reflux disease, unspecified whether esophagitis present        History of colon polyps               Past Medical History:  Past Medical History:   Diagnosis Date    Anxiety     Asthma, acute     Hyperlipidemia 7/31/2017    Joint pain     TIA (transient ischemic attack) 7/31/2017    TMJ (dislocation of temporomandibular joint)      Past Surgical History:   Procedure Laterality Date    KNEE SURGERY      left side     Review of patient's allergies indicates:   Allergen Reactions    Codeine Nausea And Vomiting     Other reaction(s): Nausea    Lexapro [escitalopram oxalate]      Erectile dysfunction.     Current Outpatient Medications on File Prior to Visit   Medication Sig Dispense Refill    losartan (COZAAR) 25 MG tablet TAKE 1 TABLET (25 MG TOTAL) BY MOUTH ONCE DAILY.  30 tablet 11    nebulizer and compressor Sushma use as directed 1 each 0    ondansetron (ZOFRAN-ODT) 8 MG TbDL Take 1 tablet (8 mg total) by mouth every 6 (six) hours as needed. 30 tablet 0    SYMPROIC 0.2 mg Tab       VENTOLIN HFA 90 mcg/actuation inhaler INHALE 2 PUFFS BY MOUTH EVERY 6 HOURS AS NEEDED FOR WHEEZING. 54 g 11    WIXELA INHUB 250-50 mcg/dose diskus inhaler INHALE 1 PUFF INTO THE LUNGS 2 (TWO) TIMES DAILY. CONTROLLER 60 each 11    albuterol-ipratropium (DUO-NEB) 2.5 mg-0.5 mg/3 mL nebulizer solution INHALE 1 VIAL VIA NEBULIZER EVERY SIX HOURS IF NEEDED FOR WHEEZING     360 mL 5    aspirin (ECOTRIN) 81 MG EC tablet Take 81 mg by mouth.      atorvastatin (LIPITOR) 20 MG tablet TAKE 1 TABLET BY MOUTH EVERY DAY 30 tablet 11    chlorzoxazone (PARAFON FORTE) 500 mg Tab Take 500 mg by mouth 3 (three) times daily.  1    clonazePAM (KLONOPIN) 0.5 MG tablet Take 1 tablet (0.5 mg total) by mouth nightly as needed for Anxiety. 90 tablet 1    hydrocodone-acetaminophen 10-325mg (NORCO)  mg Tab Take 1 tablet by mouth.      [DISCONTINUED] azelastine (ASTELIN) 137 mcg (0.1 %) nasal spray 1 spray (137 mcg total) by Nasal route 2 (two) times daily. for 7 days 30 mL 0    [DISCONTINUED] losartan (COZAAR) 25 MG tablet TAKE 1  TABLET (25 MG TOTAL) BY MOUTH ONCE DAILY. 90 tablet 0    [DISCONTINUED] omeprazole (PRILOSEC) 40 MG capsule Take 1 capsule (40 mg total) by mouth once daily. 30 capsule 2    [DISCONTINUED] tadalafil (CIALIS) 5 MG tablet Take 1 tablet (5 mg total) by mouth daily as needed for Erectile Dysfunction. 30 tablet 5     No current facility-administered medications on file prior to visit.      Social History     Socioeconomic History    Marital status:      Spouse name: Myrtle    Number of children: 2    Years of education: Not on file    Highest education level: Not on file   Occupational History    Occupation: Electronic Tech     Employer: [employer]     Employer:  [employer]   Social Needs    Financial resource strain: Not on file    Food insecurity     Worry: Not on file     Inability: Not on file    Transportation needs     Medical: Not on file     Non-medical: Not on file   Tobacco Use    Smoking status: Current Every Day Smoker     Packs/day: 2.00     Years: 41.00     Pack years: 82.00     Types: Cigarettes     Start date: 1970    Smokeless tobacco: Never Used   Substance and Sexual Activity    Alcohol use: Yes     Alcohol/week: 2.0 standard drinks     Types: 2 Cans of beer per week     Comment: He is a reformed alcoholic/ 2 beers per week    Drug use: Yes     Comment: He used to use marijuana.    Sexual activity: Yes     Partners: Female   Lifestyle    Physical activity     Days per week: Not on file     Minutes per session: Not on file    Stress: Not on file   Relationships    Social connections     Talks on phone: Not on file     Gets together: Not on file     Attends Islam service: Not on file     Active member of club or organization: Not on file     Attends meetings of clubs or organizations: Not on file     Relationship status: Not on file   Other Topics Concern    Not on file   Social History Narrative    Not on file     Family History   Problem Relation Age of  "Onset    Stroke Mother     Hypertension Mother     Stroke Father     Hypertension Father     Stroke Unknown         grandmother/grandfather       Review of Systems   Constitutional: Negative.  Negative for chills, diaphoresis and fever.   HENT: Negative for congestion, hearing loss, mouth sores, postnasal drip and sore throat.    Eyes: Negative for pain and visual disturbance.   Respiratory: Negative for cough, chest tightness, shortness of breath and wheezing.    Cardiovascular: Negative for chest pain.   Gastrointestinal: Negative for abdominal pain, anal bleeding, blood in stool, constipation, diarrhea, nausea and vomiting.   Genitourinary: Negative for dysuria and hematuria.   Musculoskeletal: Negative for back pain, neck pain and neck stiffness.   Skin: Negative for rash.   Neurological: Positive for dizziness. Negative for weakness.       Objective:     /82   Pulse 80   Temp 97.9 °F (36.6 °C)   Resp 20   Ht 5' 6" (1.676 m)   Wt 72.1 kg (159 lb)   BMI 25.66 kg/m²     Physical Exam  Constitutional:       Appearance: He is well-developed. He is not diaphoretic.   HENT:      Head: Normocephalic and atraumatic.      Right Ear: External ear normal.      Left Ear: External ear normal.      Nose: Nose normal.      Mouth/Throat:      Pharynx: No oropharyngeal exudate.   Eyes:      General: No scleral icterus.        Right eye: No discharge.         Left eye: No discharge.      Conjunctiva/sclera: Conjunctivae normal.      Pupils: Pupils are equal, round, and reactive to light.   Neck:      Musculoskeletal: Normal range of motion and neck supple.      Thyroid: No thyromegaly.      Vascular: No JVD.   Cardiovascular:      Rate and Rhythm: Normal rate and regular rhythm.      Heart sounds: Normal heart sounds. No murmur. No friction rub. No gallop.    Pulmonary:      Effort: Pulmonary effort is normal. No respiratory distress.      Breath sounds: Normal breath sounds. No wheezing or rales.   Chest:      " Chest wall: No tenderness.   Abdominal:      General: Bowel sounds are normal. There is no distension.      Palpations: Abdomen is soft. There is no mass.      Tenderness: There is no abdominal tenderness. There is no guarding or rebound.   Musculoskeletal: Normal range of motion.         General: No tenderness.   Lymphadenopathy:      Cervical: No cervical adenopathy.   Skin:     General: Skin is warm and dry.   Neurological:      Mental Status: He is alert and oriented to person, place, and time.      Cranial Nerves: No cranial nerve deficit.      Coordination: Coordination normal.       Assessment:     1. Annual physical exam    2. Anxiety    3. Asthma with COPD / Emphysema    4. Essential hypertension    5. Hyperlipidemia, unspecified hyperlipidemia type    6. Nicotine dependence, cigarettes, uncomplicated    7. TIA (transient ischemic attack)    8. Intermittent asthma, uncomplicated    9. Mild intermittent asthma with acute exacerbation Active   10. Transient cerebral ischemia, unspecified type    11. Immunization due    12. Encounter for screening for HIV    13. Encounter for prostate cancer screening    14. Erectile dysfunction, unspecified erectile dysfunction type    15. Gastroesophageal reflux disease, unspecified whether esophagitis present    16. Colon cancer screening    17. History of colon polyps    18. Other fatigue    19. Meniere's disease of left ear        Plan:     Problem List Items Addressed This Visit     Asthma with COPD / Emphysema (Chronic)    Relevant Medications    albuterol (VENTOLIN HFA) 90 mcg/actuation inhaler    albuterol-ipratropium (DUO-NEB) 2.5 mg-0.5 mg/3 mL nebulizer solution    fluticasone-salmeterol diskus inhaler 250-50 mcg    Anxiety    HTN (hypertension)    Relevant Medications    losartan (COZAAR) 25 MG tablet    aspirin (ECOTRIN) 81 MG EC tablet    Other Relevant Orders    Comprehensive Metabolic Panel    Metanephrines, urine 24 Hours    VMA, urine    Cortisol    CBC Auto  Differential    TSH    Colon cancer screening    Relevant Orders    COVID-19 Routine Screening    Nicotine dependence, cigarettes, uncomplicated    TIA (transient ischemic attack)    Relevant Medications    atorvastatin (LIPITOR) 20 MG tablet    Hyperlipidemia    Relevant Orders    Lipid Panel    Meniere's disease of left ear    Other fatigue      Other Visit Diagnoses     Annual physical exam    -  Primary    Intermittent asthma, uncomplicated        Relevant Medications    albuterol (VENTOLIN HFA) 90 mcg/actuation inhaler    Mild intermittent asthma with acute exacerbation  (Chronic)  (Active)      Relevant Medications    albuterol-ipratropium (DUO-NEB) 2.5 mg-0.5 mg/3 mL nebulizer solution    Transient cerebral ischemia, unspecified type        Relevant Medications    atorvastatin (LIPITOR) 20 MG tablet    Immunization due        Relevant Orders    Influenza - Quadrivalent (PF)    Encounter for screening for HIV        Relevant Orders    HIV 1/2 Ag/Ab (4th Gen)    Encounter for prostate cancer screening        Relevant Orders    PSA, Screening    Erectile dysfunction, unspecified erectile dysfunction type        Gastroesophageal reflux disease, unspecified whether esophagitis present        History of colon polyps        Relevant Orders    COVID-19 Routine Screening    Preop examination        Relevant Orders    COVID-19 Routine Screening        No follow-ups on file.      I am having Hector Rao maintain his HYDROcodone-acetaminophen, aspirin, nebulizer and compressor, clonazePAM, SYMPROIC, chlorzoxazone, ondansetron, atorvastatin, VENTOLIN HFA, WIXELA INHUB, albuterol-ipratropium, and losartan.    Hector was seen today for sore throat, dizziness, fatigue and stress.    Diagnoses and all orders for this visit:    Annual physical exam    Anxiety  -     clonazePAM (KLONOPIN) 0.5 MG tablet; Take 1 tablet (0.5 mg total) by mouth nightly as needed for Anxiety.    Asthma with COPD / Emphysema  -     albuterol  (VENTOLIN HFA) 90 mcg/actuation inhaler; Inhale 2 puffs into the lungs every 6 (six) hours as needed. Rescue  -     albuterol-ipratropium (DUO-NEB) 2.5 mg-0.5 mg/3 mL nebulizer solution; INHALE 1 VIAL VIA NEBULIZER EVERY SIX HOURS IF NEEDED FOR WHEEZING  -     fluticasone-salmeterol diskus inhaler 250-50 mcg; Inhale 1 puff into the lungs 2 (two) times a day. Controller    Essential hypertension  -     losartan (COZAAR) 25 MG tablet; Take 1 tablet (25 mg total) by mouth once daily.  -     Comprehensive Metabolic Panel; Future    Hyperlipidemia, unspecified hyperlipidemia type  -     Lipid Panel; Future    Nicotine dependence, cigarettes, uncomplicated    TIA (transient ischemic attack)    Intermittent asthma, uncomplicated  -     albuterol (VENTOLIN HFA) 90 mcg/actuation inhaler; Inhale 2 puffs into the lungs every 6 (six) hours as needed. Rescue    Mild intermittent asthma with acute exacerbation  -     albuterol-ipratropium (DUO-NEB) 2.5 mg-0.5 mg/3 mL nebulizer solution; INHALE 1 VIAL VIA NEBULIZER EVERY SIX HOURS IF NEEDED FOR WHEEZING    Transient cerebral ischemia, unspecified type  -     atorvastatin (LIPITOR) 20 MG tablet; Take 1 tablet (20 mg total) by mouth once daily.    Immunization due  -     Influenza - Quadrivalent (PF); Future    Encounter for screening for HIV  -     HIV 1/2 Ag/Ab (4th Gen); Future    Encounter for prostate cancer screening  -     PSA, Screening; Future    Erectile dysfunction, unspecified erectile dysfunction type    Gastroesophageal reflux disease, unspecified whether esophagitis present    Colon cancer screening  -     COVID-19 Routine Screening; Future    History of colon polyps  -     COVID-19 Routine Screening; Future    Other fatigue    Meniere's disease of left ear    Other orders  -     Cancel: azelastine (ASTELIN) 137 mcg (0.1 %) nasal spray; 1 spray (137 mcg total) by Nasal route 2 (two) times daily. for 7 days  -     Cancel: omeprazole (PRILOSEC) 40 MG capsule; Take 1  capsule (40 mg total) by mouth once daily.  -     Cancel: ondansetron (ZOFRAN-ODT) 8 MG TbDL; Take 1 tablet (8 mg total) by mouth every 6 (six) hours as needed.  -     Cancel: tadalafiL (CIALIS) 5 MG tablet; Take 1 tablet (5 mg total) by mouth daily as needed for Erectile Dysfunction.  -     Cancel: Zoster Recombinant Vaccine  -     Cancel: Case request GI: COLONOSCOPY  -     Cancel: sodium,potassium,mag sulfates (SUPREP BOWEL PREP KIT) 17.5-3.13-1.6 gram SolR; Take as instructed on prep sheet    he refuses a colonoscopy and he refuses a shingles vaccine.     The patient was instructed to stop the following meds:  Medications Discontinued During This Encounter   Medication Reason    tadalafil (CIALIS) 5 MG tablet Patient no longer taking    omeprazole (PRILOSEC) 40 MG capsule Patient no longer taking    azelastine (ASTELIN) 137 mcg (0.1 %) nasal spray Patient no longer taking     No orders of the defined types were placed in this encounter.

## 2020-11-03 ENCOUNTER — LAB VISIT (OUTPATIENT)
Dept: LAB | Facility: HOSPITAL | Age: 55
End: 2020-11-03
Attending: FAMILY MEDICINE
Payer: COMMERCIAL

## 2020-11-03 DIAGNOSIS — E78.5 HYPERLIPIDEMIA, UNSPECIFIED HYPERLIPIDEMIA TYPE: ICD-10-CM

## 2020-11-03 DIAGNOSIS — Z12.5 ENCOUNTER FOR PROSTATE CANCER SCREENING: ICD-10-CM

## 2020-11-03 DIAGNOSIS — Z11.4 ENCOUNTER FOR SCREENING FOR HIV: ICD-10-CM

## 2020-11-03 DIAGNOSIS — I10 ESSENTIAL HYPERTENSION: ICD-10-CM

## 2020-11-03 LAB
ALBUMIN SERPL BCP-MCNC: 4.1 G/DL (ref 3.5–5.2)
ALP SERPL-CCNC: 66 U/L (ref 55–135)
ALT SERPL W/O P-5'-P-CCNC: 22 U/L (ref 10–44)
ANION GAP SERPL CALC-SCNC: 10 MMOL/L (ref 8–16)
AST SERPL-CCNC: 19 U/L (ref 10–40)
BASOPHILS # BLD AUTO: 0.03 K/UL (ref 0–0.2)
BASOPHILS NFR BLD: 0.5 % (ref 0–1.9)
BILIRUB SERPL-MCNC: 0.4 MG/DL (ref 0.1–1)
BUN SERPL-MCNC: 12 MG/DL (ref 6–20)
CALCIUM SERPL-MCNC: 9.6 MG/DL (ref 8.7–10.5)
CHLORIDE SERPL-SCNC: 105 MMOL/L (ref 95–110)
CHOLEST SERPL-MCNC: 181 MG/DL (ref 120–199)
CHOLEST/HDLC SERPL: 3.4 {RATIO} (ref 2–5)
CO2 SERPL-SCNC: 25 MMOL/L (ref 23–29)
COMPLEXED PSA SERPL-MCNC: 0.56 NG/ML (ref 0–4)
CORTIS SERPL-MCNC: 7.7 UG/DL
CREAT SERPL-MCNC: 1 MG/DL (ref 0.5–1.4)
DIFFERENTIAL METHOD: NORMAL
EOSINOPHIL # BLD AUTO: 0.2 K/UL (ref 0–0.5)
EOSINOPHIL NFR BLD: 2.7 % (ref 0–8)
ERYTHROCYTE [DISTWIDTH] IN BLOOD BY AUTOMATED COUNT: 12.5 % (ref 11.5–14.5)
EST. GFR  (AFRICAN AMERICAN): >60 ML/MIN/1.73 M^2
EST. GFR  (NON AFRICAN AMERICAN): >60 ML/MIN/1.73 M^2
GLUCOSE SERPL-MCNC: 96 MG/DL (ref 70–110)
HCT VFR BLD AUTO: 52.2 % (ref 40–54)
HDLC SERPL-MCNC: 53 MG/DL (ref 40–75)
HDLC SERPL: 29.3 % (ref 20–50)
HGB BLD-MCNC: 16.9 G/DL (ref 14–18)
IMM GRANULOCYTES # BLD AUTO: 0.02 K/UL (ref 0–0.04)
IMM GRANULOCYTES NFR BLD AUTO: 0.4 % (ref 0–0.5)
LDLC SERPL CALC-MCNC: 111.4 MG/DL (ref 63–159)
LYMPHOCYTES # BLD AUTO: 1.7 K/UL (ref 1–4.8)
LYMPHOCYTES NFR BLD: 30 % (ref 18–48)
MCH RBC QN AUTO: 30.2 PG (ref 27–31)
MCHC RBC AUTO-ENTMCNC: 32.4 G/DL (ref 32–36)
MCV RBC AUTO: 93 FL (ref 82–98)
MONOCYTES # BLD AUTO: 0.5 K/UL (ref 0.3–1)
MONOCYTES NFR BLD: 8.3 % (ref 4–15)
NEUTROPHILS # BLD AUTO: 3.3 K/UL (ref 1.8–7.7)
NEUTROPHILS NFR BLD: 58.1 % (ref 38–73)
NONHDLC SERPL-MCNC: 128 MG/DL
NRBC BLD-RTO: 0 /100 WBC
PLATELET # BLD AUTO: 297 K/UL (ref 150–350)
PMV BLD AUTO: 10.1 FL (ref 9.2–12.9)
POTASSIUM SERPL-SCNC: 4.4 MMOL/L (ref 3.5–5.1)
PROT SERPL-MCNC: 7.4 G/DL (ref 6–8.4)
RBC # BLD AUTO: 5.59 M/UL (ref 4.6–6.2)
SODIUM SERPL-SCNC: 140 MMOL/L (ref 136–145)
TRIGL SERPL-MCNC: 83 MG/DL (ref 30–150)
TSH SERPL DL<=0.005 MIU/L-ACNC: 2.49 UIU/ML (ref 0.4–4)
WBC # BLD AUTO: 5.63 K/UL (ref 3.9–12.7)

## 2020-11-03 PROCEDURE — 80053 COMPREHEN METABOLIC PANEL: CPT

## 2020-11-03 PROCEDURE — 82533 TOTAL CORTISOL: CPT

## 2020-11-03 PROCEDURE — 80061 LIPID PANEL: CPT

## 2020-11-03 PROCEDURE — 36415 COLL VENOUS BLD VENIPUNCTURE: CPT | Mod: PO

## 2020-11-03 PROCEDURE — 86703 HIV-1/HIV-2 1 RESULT ANTBDY: CPT

## 2020-11-03 PROCEDURE — 84153 ASSAY OF PSA TOTAL: CPT

## 2020-11-03 PROCEDURE — 85025 COMPLETE CBC W/AUTO DIFF WBC: CPT

## 2020-11-03 PROCEDURE — 84443 ASSAY THYROID STIM HORMONE: CPT

## 2020-11-04 LAB — HIV 1+2 AB+HIV1 P24 AG SERPL QL IA: NEGATIVE

## 2020-11-04 NOTE — PROGRESS NOTES
I have reviewed the labs and am recommending the following:    CBC NORMAL-The CBC appears to be stable at this time with no sign of major anemia, abnormal white count or platelet abnormality.  CMP/BMP NORMAL-The electrolytes all appear normal at this time.    LIPID NORMAL SCREEN-The cholesterol panel screening showed levels that are considered at target at this time.  Recheck each year.   PSA NORMAL-The PSA screening for prostate cancer is normal.  Recheck annually.    TSH NORMAL-The TSH screening indicated a normal function of the thyroid.  The cortisol level is also normal at this time.  This is all good.      Dr. Jerome Arnold

## 2020-11-07 ENCOUNTER — PATIENT MESSAGE (OUTPATIENT)
Dept: FAMILY MEDICINE | Facility: CLINIC | Age: 55
End: 2020-11-07

## 2020-11-09 ENCOUNTER — OFFICE VISIT (OUTPATIENT)
Dept: FAMILY MEDICINE | Facility: CLINIC | Age: 55
End: 2020-11-09
Payer: COMMERCIAL

## 2020-11-09 ENCOUNTER — HOSPITAL ENCOUNTER (OUTPATIENT)
Dept: RADIOLOGY | Facility: HOSPITAL | Age: 55
Discharge: HOME | End: 2020-11-09
Attending: FAMILY MEDICINE
Payer: COMMERCIAL

## 2020-11-09 VITALS
WEIGHT: 161 LBS | HEART RATE: 87 BPM | DIASTOLIC BLOOD PRESSURE: 74 MMHG | HEIGHT: 65 IN | TEMPERATURE: 99 F | BODY MASS INDEX: 26.82 KG/M2 | SYSTOLIC BLOOD PRESSURE: 121 MMHG

## 2020-11-09 DIAGNOSIS — M54.2 NECK PAIN: Primary | ICD-10-CM

## 2020-11-09 DIAGNOSIS — H81.02 MENIERE'S DISEASE OF LEFT EAR: ICD-10-CM

## 2020-11-09 DIAGNOSIS — M54.2 NECK PAIN: ICD-10-CM

## 2020-11-09 PROCEDURE — 3078F DIAST BP <80 MM HG: CPT | Mod: CPTII,S$GLB,, | Performed by: FAMILY MEDICINE

## 2020-11-09 PROCEDURE — 3074F SYST BP LT 130 MM HG: CPT | Mod: CPTII,S$GLB,, | Performed by: FAMILY MEDICINE

## 2020-11-09 PROCEDURE — 72050 X-RAY EXAM NECK SPINE 4/5VWS: CPT | Mod: 26,,, | Performed by: RADIOLOGY

## 2020-11-09 PROCEDURE — 3008F PR BODY MASS INDEX (BMI) DOCUMENTED: ICD-10-PCS | Mod: CPTII,S$GLB,, | Performed by: FAMILY MEDICINE

## 2020-11-09 PROCEDURE — 99999 PR PBB SHADOW E&M-EST. PATIENT-LVL IV: ICD-10-PCS | Mod: PBBFAC,,, | Performed by: FAMILY MEDICINE

## 2020-11-09 PROCEDURE — 3008F BODY MASS INDEX DOCD: CPT | Mod: CPTII,S$GLB,, | Performed by: FAMILY MEDICINE

## 2020-11-09 PROCEDURE — 3078F PR MOST RECENT DIASTOLIC BLOOD PRESSURE < 80 MM HG: ICD-10-PCS | Mod: CPTII,S$GLB,, | Performed by: FAMILY MEDICINE

## 2020-11-09 PROCEDURE — 72050 X-RAY EXAM NECK SPINE 4/5VWS: CPT | Mod: TC,PO

## 2020-11-09 PROCEDURE — 99999 PR PBB SHADOW E&M-EST. PATIENT-LVL IV: CPT | Mod: PBBFAC,,, | Performed by: FAMILY MEDICINE

## 2020-11-09 PROCEDURE — 99214 PR OFFICE/OUTPT VISIT, EST, LEVL IV, 30-39 MIN: ICD-10-PCS | Mod: S$GLB,,, | Performed by: FAMILY MEDICINE

## 2020-11-09 PROCEDURE — 99214 OFFICE O/P EST MOD 30 MIN: CPT | Mod: S$GLB,,, | Performed by: FAMILY MEDICINE

## 2020-11-09 PROCEDURE — 72050 XR CERVICAL SPINE COMPLETE 5 VIEW: ICD-10-PCS | Mod: 26,,, | Performed by: RADIOLOGY

## 2020-11-09 PROCEDURE — 3074F PR MOST RECENT SYSTOLIC BLOOD PRESSURE < 130 MM HG: ICD-10-PCS | Mod: CPTII,S$GLB,, | Performed by: FAMILY MEDICINE

## 2020-11-09 RX ORDER — PREDNISONE 5 MG/1
TABLET ORAL
Qty: 45 TABLET | Refills: 0 | Status: SHIPPED | OUTPATIENT
Start: 2020-11-09 | End: 2021-12-29

## 2020-11-09 RX ORDER — FLUTICASONE PROPIONATE 50 MCG
SPRAY, SUSPENSION (ML) NASAL
Qty: 16 G | Refills: 12 | Status: SHIPPED | OUTPATIENT
Start: 2020-11-09 | End: 2021-12-29

## 2020-11-09 RX ORDER — FEXOFENADINE HCL AND PSEUDOEPHEDRINE HCI 60; 120 MG/1; MG/1
1 TABLET, EXTENDED RELEASE ORAL 2 TIMES DAILY
Qty: 60 TABLET | Refills: 0 | Status: SHIPPED | OUTPATIENT
Start: 2020-11-09 | End: 2021-11-09

## 2020-11-09 NOTE — PROGRESS NOTES
"Subjective:      Patient ID: Hector Rao is a 55 y.o. male.    Chief Complaint: Dizziness    HPI  He is having continued dizziness.  This is much like what he had when he was seen by the ENT doc in the past at [place].  Looking up causes him to have double vision and he starts spinning and once it starts, he can't stop it.  Laying down helps it.  He was with Dr. Bee at [place], and he was listed as having Meniere's and he got an injection in the ear and it helped him a lot back then.  Later, it returned.  He has had pressure, ringing in his ears also.  He was seen by a chiropractor and he states that adjustments did help him 3-4 days after the adjustment.  He had been out of work for a year.  He has been back with [employer] working.  He had been able to tolerate helicopter rides and elevator rides.  Later, he had problems clearing the pressure.  He went back to Dr. Bee again and he wanted a shot in the ear.  He saw he did not have teeth on that side and he did not want to give him a shot.  Since then, he has had difficulty in getting the ear to clear and the ringing to stop.  He has good and bad days.  Laying down makes it better.  The ringing never goes away.  He has been terminated from his ear trouble, he states.  He feels that this has been related to his ear ringing.  He has been back with the chiropractor here in [place] who has been adjusting him and it helps a little bit.  Pulling up on the head makes him have a little more relief.  He got a cervical traction unit to allow him to do this at home.  It helps with the tension.  It has not helped him much with the ear issue.  He feels like he has something at the base of his skull that needs to be released of a "pinch pressure" in that area.  He can't crack his neck to the left, he states.  Leaning his head to the left will cause him to have double vision and things start spinning.  He has quit the traction.    He has good and bad days with his " hearing.  Ringing is high though.      Lab Visit on 11/03/2020   Component Date Value Ref Range Status    Cholesterol 11/03/2020 181  120 - 199 mg/dL Final    Comment: The National Cholesterol Education Program (NCEP) has set the  following guidelines (reference ranges) for Cholesterol:  Optimal.....................<200 mg/dL  Borderline High.............200-239 mg/dL  High........................> or = 240 mg/dL      Triglycerides 11/03/2020 83  30 - 150 mg/dL Final    Comment: The National Cholesterol Education Program (NCEP) has set the  following guidelines (reference values) for triglycerides:  Normal......................<150 mg/dL  Borderline High.............150-199 mg/dL  High........................200-499 mg/dL      HDL 11/03/2020 53  40 - 75 mg/dL Final    Comment: The National Cholesterol Education Program (NCEP) has set the  following guidelines (reference values) for HDL Cholesterol:  Low...............<40 mg/dL  Optimal...........>60 mg/dL      LDL Cholesterol 11/03/2020 111.4  63.0 - 159.0 mg/dL Final    Comment: The National Cholesterol Education Program (NCEP) has set the  following guidelines (reference values) for LDL Cholesterol:  Optimal.......................<130 mg/dL  Borderline High...............130-159 mg/dL  High..........................160-189 mg/dL  Very High.....................>190 mg/dL      HDL/Cholesterol Ratio 11/03/2020 29.3  20.0 - 50.0 % Final    Total Cholesterol/HDL Ratio 11/03/2020 3.4  2.0 - 5.0 Final    Non-HDL Cholesterol 11/03/2020 128  mg/dL Final    Comment: Risk category and Non-HDL cholesterol goals:  Coronary heart disease (CHD)or equivalent (10-year risk of CHD >20%):  Non-HDL cholesterol goal     <130 mg/dL  Two or more CHD risk factors and 10-year risk of CHD <= 20%:  Non-HDL cholesterol goal     <160 mg/dL  0 to 1 CHD risk factor:  Non-HDL cholesterol goal     <190 mg/dL      PSA, Screen 11/03/2020 0.56  0.00 - 4.00 ng/mL Final    Comment: PSA  Expected levels:  Hormonal Therapy: <0.05 ng/ml  Prostatectomy: <0.01 ng/ml  Radiation Therapy: <1.00 ng/ml      Sodium 11/03/2020 140  136 - 145 mmol/L Final    Potassium 11/03/2020 4.4  3.5 - 5.1 mmol/L Final    Chloride 11/03/2020 105  95 - 110 mmol/L Final    CO2 11/03/2020 25  23 - 29 mmol/L Final    Glucose 11/03/2020 96  70 - 110 mg/dL Final    BUN 11/03/2020 12  6 - 20 mg/dL Final    Creatinine 11/03/2020 1.0  0.5 - 1.4 mg/dL Final    Calcium 11/03/2020 9.6  8.7 - 10.5 mg/dL Final    Total Protein 11/03/2020 7.4  6.0 - 8.4 g/dL Final    Albumin 11/03/2020 4.1  3.5 - 5.2 g/dL Final    Total Bilirubin 11/03/2020 0.4  0.1 - 1.0 mg/dL Final    Comment: For infants and newborns, interpretation of results should be based  on gestational age, weight and in agreement with clinical  observations.  Premature Infant recommended reference ranges:  Up to 24 hours.............<8.0 mg/dL  Up to 48 hours............<12.0 mg/dL  3-5 days..................<15.0 mg/dL  6-29 days.................<15.0 mg/dL      Alkaline Phosphatase 11/03/2020 66  55 - 135 U/L Final    AST 11/03/2020 19  10 - 40 U/L Final    ALT 11/03/2020 22  10 - 44 U/L Final    Anion Gap 11/03/2020 10  8 - 16 mmol/L Final    eGFR if African American 11/03/2020 >60.0  >60 mL/min/1.73 m^2 Final    eGFR if non African American 11/03/2020 >60.0  >60 mL/min/1.73 m^2 Final    Comment: Calculation used to obtain the estimated glomerular filtration  rate (eGFR) is the CKD-EPI equation.       HIV 1/2 Ag/Ab 11/03/2020 Negative  Negative Final    Cortisol 11/03/2020 7.70  ug/dL Final    Comment: Cortisol Reference Range:  Before 10:00 am: 4.46-22.7 ug/dL  After 5:00 pm:    1.7-14.1 ug/dL      WBC 11/03/2020 5.63  3.90 - 12.70 K/uL Final    RBC 11/03/2020 5.59  4.60 - 6.20 M/uL Final    Hemoglobin 11/03/2020 16.9  14.0 - 18.0 g/dL Final    Hematocrit 11/03/2020 52.2  40.0 - 54.0 % Final    MCV 11/03/2020 93  82 - 98 fL Final    MCH 11/03/2020  30.2  27.0 - 31.0 pg Final    MCHC 11/03/2020 32.4  32.0 - 36.0 g/dL Final    RDW 11/03/2020 12.5  11.5 - 14.5 % Final    Platelets 11/03/2020 297  150 - 350 K/uL Final    MPV 11/03/2020 10.1  9.2 - 12.9 fL Final    Immature Granulocytes 11/03/2020 0.4  0.0 - 0.5 % Final    Gran # (ANC) 11/03/2020 3.3  1.8 - 7.7 K/uL Final    Immature Grans (Abs) 11/03/2020 0.02  0.00 - 0.04 K/uL Final    Comment: Mild elevation in immature granulocytes is non specific and   can be seen in a variety of conditions including stress response,   acute inflammation, trauma and pregnancy. Correlation with other   laboratory and clinical findings is essential.      Lymph # 11/03/2020 1.7  1.0 - 4.8 K/uL Final    Mono # 11/03/2020 0.5  0.3 - 1.0 K/uL Final    Eos # 11/03/2020 0.2  0.0 - 0.5 K/uL Final    Baso # 11/03/2020 0.03  0.00 - 0.20 K/uL Final    nRBC 11/03/2020 0  0 /100 WBC Final    Gran % 11/03/2020 58.1  38.0 - 73.0 % Final    Lymph % 11/03/2020 30.0  18.0 - 48.0 % Final    Mono % 11/03/2020 8.3  4.0 - 15.0 % Final    Eosinophil % 11/03/2020 2.7  0.0 - 8.0 % Final    Basophil % 11/03/2020 0.5  0.0 - 1.9 % Final    Differential Method 11/03/2020 Automated   Final    TSH 11/03/2020 2.489  0.400 - 4.000 uIU/mL Final   Lab Visit on 11/03/2020   Component Date Value Ref Range Status    Hours Collected 11/03/2020 24  hr Corrected    Urine Total Volume 11/03/2020 1575  mL Final    Creatinine, Urine - per volume 11/03/2020 98  mg/dL Final    Creatinine, urine - per 24 hours 11/03/2020 1544  800 - 2100 mg/d Corrected    Comment: Performed by [facility],                              [address] [facility] [address] [phone]                    [email or web address], Urszula Pelayo MD - Lab. Director      Metanephrines, 24H Ur 11/03/2020 238  55 - 320 ug/d Final    Metanephrines, urine - ratio to CRT 11/03/2020 154  0 - 300 ug/g CRT Final    Metanephrines, urine - per volume 11/03/2020 151  ug/L Final     Normetanephrine, 24H Ur 11/03/2020 422  114 - 865 ug/d Final    Normetanephrines, urine - ratio to* 11/03/2020 273  0 - 400 ug/g CRT Final    Normetanephrines, urine - per volu* 11/03/2020 268  ug/L Final    Metanephrines 24 Hr Interp. 11/03/2020 See Note   Final    Comment: TEST INFORMATION: Metanephrines Fractionated, Urine  The optimal specimen for this testing is a 24-hour urine   collection. Per-day calculations are not reported for   patients younger than 7 years of age and for the following   specimen types: a random collection, a collection with   duration of less than 20 hours, a collection with duration   of greater than 28 hours, or a collection with total volume   less than 400 mL (if 18 years of age or older) or greater   than 5000 mL (all ages). Ratios to creatinine may be useful   for these evaluations.  Smaller increases in metanephrine and/or normetanephrine   concentrations (less than two times the upper reference   limit) usually are the result of physiological stimuli,   drugs, or improper specimen collection. Essential   hypertension is often associated with slight elevations   (metanephrine less than 400 ug/d and normetanephrine less   than 900 ug/d). Elevated concentrations may be due to   intense physical activity, life-threatening                            illness, and   drug interferences.   Significant elevation of one or both metanephrines (three   or more times the upper reference limit) is associated with   an increased probability of a neuroendocrine tumor.  Access complete set of age- and/or gender-specific   reference intervals for this test in the [facility]   Test Directory ([email or web address]).  See Compliance statement B: [email or web address]      Hours Collected 11/03/2020 24  hr Corrected    Urine Total Volume 11/03/2020 1575  mL Final    Creatinine, Urine - per volume 11/03/2020 98  mg/dL Final    Creatinine, urine - per 24 hours 11/03/2020 1544  800 - 2100 mg/d Corrected     Comment: Performed by [facility],                              [address] [facility] [place] [facility] [place] [address] [phone]                    [email or web address], Urszula Pelayo MD - Lab. Director      VMA, Urine - per volume 11/03/2020 3.1  mg/L Final    VMA, 24H Ur 11/03/2020 4.9  0.0 - 7.0 mg/d Final    VMA Interp. 11/03/2020 See Note   Final    Comment: INTERPRETIVE INFORMATION: Vanillylmandelic Acid (VMA), Urine  Vanillylmandelic acid (VMA) results are expressed as a   ratio to creatinine excretion (mg/g CRT). VMA mass per day   (mg/d) is not reported on specimens from patients younger   than 18 years of age, or for random specimens, urine   collection periods other than 24 hours, or urine volumes   less than 400 mL/d. No reference interval is available for   results reported in units of mg/L. Slight or moderate   increases in catecholamine metabolites may be due to   extreme anxiety, essential hypertension, intense physical   exercise, or drug interactions. Significant increase of one   or more catecholamine metabolites (several times the upper   reference limit) is associated with an increased   probability of a secreting neuroendocrine tumor.  Test developed and characteristics determined by [facility].   See Compliance Statement B: [employer]/CS     Office Visit on 10/30/2020   Component Date Value Ref Range Status    SARS-CoV2 (COVID-19) Qualitative P* 10/30/2020 Not Detected  Not Detected Final    Comment: This test utilizes a real-time reverse transcription  polymerase chain reaction procedure to amplify and   detect the SARS-CoV-2 RdRp and N genes.    The analytical sensitivity (limit of detection) of   this assay is 100 copies/mL.   A Detected result is considered positive for COVID-19.  This patient is considered infected with the   SARS-CoV-2 virus and is presumed to be contagious.    A Not Detected result means that SARS-CoV-2 RNA is not  present above the limit of detection. It does not rule  out  "the possibility of COVID-19 and should not be the  sole basis for treatment decisions.  If COVID-19 is   strongly suspected based on clinical and exposure   history,re-testing should be considered.    This test is only for use under Food and Drug   Administration s Emergency Use Authorization (EUA).   Commercial reagents are provided by [facility].  Performance characteristics of the EUA have been   independently verified by [facility]   Department of Pathology and L                           aboratory Medicine.         He also has chronic neck pain and this affect his pain on the left side.    Review of Systems   Constitutional: Negative for chills and fever.   HENT: Positive for hearing loss. Negative for postnasal drip and trouble swallowing.    Respiratory: Negative for cough, shortness of breath and wheezing.    Cardiovascular: Negative for chest pain and palpitations.   Musculoskeletal: Positive for neck pain.   Neurological: Positive for dizziness.       Objective:   /74   Pulse 87   Temp 98.8 °F (37.1 °C)   Ht 5' 5" (1.651 m)   Wt 73 kg (161 lb)   BMI 26.79 kg/m²     Physical Exam  Constitutional:       Appearance: He is well-developed. He is not diaphoretic.   HENT:      Head: Normocephalic and atraumatic.      Right Ear: External ear normal.      Left Ear: External ear normal.      Nose: Nose normal.      Mouth/Throat:      Pharynx: No oropharyngeal exudate.   Eyes:      General: No scleral icterus.        Right eye: No discharge.         Left eye: No discharge.      Conjunctiva/sclera: Conjunctivae normal.      Pupils: Pupils are equal, round, and reactive to light.   Neck:      Musculoskeletal: Normal range of motion and neck supple. Spinous process tenderness and muscular tenderness present.      Thyroid: No thyromegaly.      Vascular: No JVD.     Cardiovascular:      Rate and Rhythm: Normal rate and regular rhythm.      Heart sounds: Normal heart sounds. No murmur. No " friction rub. No gallop.    Pulmonary:      Effort: Pulmonary effort is normal. No respiratory distress.      Breath sounds: Normal breath sounds. No wheezing or rales.   Chest:      Chest wall: No tenderness.   Abdominal:      General: Bowel sounds are normal. There is no distension.      Palpations: Abdomen is soft. There is no mass.      Tenderness: There is no abdominal tenderness. There is no guarding or rebound.   Musculoskeletal: Normal range of motion.         General: No tenderness.   Lymphadenopathy:      Cervical: No cervical adenopathy.   Skin:     General: Skin is warm and dry.   Neurological:      Mental Status: He is alert and oriented to person, place, and time.      Cranial Nerves: No cranial nerve deficit.      Coordination: Coordination normal.         Assessment:     1. Neck pain    2. Meniere's disease of left ear        Plan:   Hector was seen today for dizziness.    Diagnoses and all orders for this visit:    Neck pain  -     X-Ray Cervical Spine Complete 5 view; Future    Meniere's disease of left ear  -     predniSONE (DELTASONE) 5 MG tablet; Take 8 po q day x 3 days then 4 po q day x 3 days then 2 po q day x 3 days then 1 po q day x 3 days then stop.  -     fexofenadine-pseudoephedrine  mg (ALLEGRA-D)  mg per tablet; Take 1 tablet by mouth 2 (two) times daily.  -     fluticasone propionate (FLONASE) 50 mcg/actuation nasal spray; Use 2 puffs in each nostril q day.  -     Ambulatory referral/consult to ENT; Future

## 2020-11-09 NOTE — PROGRESS NOTES
Arthritis is noted in the neck.  It is listed as mild at the C6-C7 level.  There is a mild decrease in the size of the holes that the nerves exit out of the spine through at C4-C5 and C5-C6.  There are bone spurs noted and these could limit range of motion of the neck.  I would only treat this with any inflammatory is moving forward.  I believe chiropractic is a fine option as you are currently doing.

## 2020-11-15 ENCOUNTER — PATIENT OUTREACH (OUTPATIENT)
Dept: ADMINISTRATIVE | Facility: OTHER | Age: 55
End: 2020-11-15

## 2020-11-15 NOTE — PROGRESS NOTES
Health Maintenance Due   Topic Date Due    Shingles Vaccine (1 of 2) 07/05/2013    Colorectal Cancer Screening  03/17/2020     Updates were requested from care everywhere.  Chart was reviewed for overdue Proactive [facility] Encounters ([facility]) topics (CRS, Breast Cancer Screening, Eye exam)  Health Maintenance has been updated.  LINKS immunization registry triggered.  Immunizations were reconciled.

## 2020-11-16 ENCOUNTER — CLINICAL SUPPORT (OUTPATIENT)
Dept: AUDIOLOGY | Facility: CLINIC | Age: 55
End: 2020-11-16
Payer: COMMERCIAL

## 2020-11-16 ENCOUNTER — OFFICE VISIT (OUTPATIENT)
Dept: OTOLARYNGOLOGY | Facility: CLINIC | Age: 55
End: 2020-11-16
Payer: COMMERCIAL

## 2020-11-16 VITALS — WEIGHT: 160.94 LBS | HEIGHT: 65 IN | BODY MASS INDEX: 26.81 KG/M2

## 2020-11-16 DIAGNOSIS — M26.652 ARTHROPATHY OF LEFT TEMPOROMANDIBULAR JOINT: ICD-10-CM

## 2020-11-16 DIAGNOSIS — H81.02 MENIERE'S DISEASE OF LEFT EAR: ICD-10-CM

## 2020-11-16 DIAGNOSIS — H93.12 LEFT-SIDED TINNITUS: ICD-10-CM

## 2020-11-16 DIAGNOSIS — H90.3 ASYMMETRIC SNHL (SENSORINEURAL HEARING LOSS): Primary | ICD-10-CM

## 2020-11-16 DIAGNOSIS — R42 DIZZINESS: ICD-10-CM

## 2020-11-16 DIAGNOSIS — H93.12 TINNITUS OF LEFT EAR: ICD-10-CM

## 2020-11-16 PROCEDURE — 92557 PR COMPREHENSIVE HEARING TEST: ICD-10-PCS | Mod: S$GLB,,, | Performed by: AUDIOLOGIST

## 2020-11-16 PROCEDURE — 99999 PR PBB SHADOW E&M-EST. PATIENT-LVL IV: CPT | Mod: PBBFAC,,, | Performed by: NURSE PRACTITIONER

## 2020-11-16 PROCEDURE — 99204 PR OFFICE/OUTPT VISIT, NEW, LEVL IV, 45-59 MIN: ICD-10-PCS | Mod: S$GLB,,, | Performed by: NURSE PRACTITIONER

## 2020-11-16 PROCEDURE — 1126F PR PAIN SEVERITY QUANTIFIED, NO PAIN PRESENT: ICD-10-PCS | Mod: S$GLB,,, | Performed by: NURSE PRACTITIONER

## 2020-11-16 PROCEDURE — 92567 PR TYMPA2METRY: ICD-10-PCS | Mod: S$GLB,,, | Performed by: AUDIOLOGIST

## 2020-11-16 PROCEDURE — 3008F BODY MASS INDEX DOCD: CPT | Mod: CPTII,S$GLB,, | Performed by: NURSE PRACTITIONER

## 2020-11-16 PROCEDURE — 99999 PR PBB SHADOW E&M-EST. PATIENT-LVL I: ICD-10-PCS | Mod: PBBFAC,,,

## 2020-11-16 PROCEDURE — 92567 TYMPANOMETRY: CPT | Mod: S$GLB,,, | Performed by: AUDIOLOGIST

## 2020-11-16 PROCEDURE — 92557 COMPREHENSIVE HEARING TEST: CPT | Mod: S$GLB,,, | Performed by: AUDIOLOGIST

## 2020-11-16 PROCEDURE — 99999 PR PBB SHADOW E&M-EST. PATIENT-LVL IV: ICD-10-PCS | Mod: PBBFAC,,, | Performed by: NURSE PRACTITIONER

## 2020-11-16 PROCEDURE — 99204 OFFICE O/P NEW MOD 45 MIN: CPT | Mod: S$GLB,,, | Performed by: NURSE PRACTITIONER

## 2020-11-16 PROCEDURE — 1126F AMNT PAIN NOTED NONE PRSNT: CPT | Mod: S$GLB,,, | Performed by: NURSE PRACTITIONER

## 2020-11-16 PROCEDURE — 3008F PR BODY MASS INDEX (BMI) DOCUMENTED: ICD-10-PCS | Mod: CPTII,S$GLB,, | Performed by: NURSE PRACTITIONER

## 2020-11-16 PROCEDURE — 99999 PR PBB SHADOW E&M-EST. PATIENT-LVL I: CPT | Mod: PBBFAC,,,

## 2020-11-16 NOTE — PROGRESS NOTES
"Subjective:       Patient ID: Hector Rao is a 55 y.o. male.    Chief Complaint: Other (Meniere's disease)    HPI   BALANCE:  Patient diagnosed with left-sided Meniere's disease in December 2013. Negative MRI-IAC in early 2014. VNG WNL. Patient treated with Dyazide, hydrops diet, and a series of trans-tympanic steroid injections. EcoG showed left-sided Meniere's. Patient received return of left hearing following the first TTS injection, but subsequent injections did not provide return of hearing.  He saw other ENTs in [place]: Dr. Jordan and Dr. Garcia, who confirmed diagnosis of left-sided Meniere's, and tried things like lipoflavanoid, betahist, and VRT. He reports continuous dizzy spells. If he tilts his head to the left, he has diplopia and within a few seconds, vertigo starts. He keeps his head tilted to right to avoid diplopia while driving and because the "whole world is at a slant, tilted."    HEARING:  Patient unable to tell direction of sound. Patient tried BICROS hearing aid but did not seem to help. He wears a bone-conduction hearing enhancer around his neck that bluetooth connects to his smart phone. Patient reports autophony AS.  He feels like his left ear is underwater or like he just got off a plane. He suspects his left ET is stenotic and unable to open causing MCKENZIE.   TMJ:  Patient reports clenching/bruxism. He states he had one remaining tooth on the left side, which he had removed; then underwent a series of dental implants. He has noticed changes to his left auditory meatus. He has custom earplugs and states his left earplug works its way out throughout the course of trying to wear it. He states when he opens and closes his jaw, a bone protrudes into his left ear canal that pushes his ear plug out.     Review of Systems   Constitutional: Negative.    HENT: Positive for dental problem (TMJ), hearing loss and tinnitus.    Eyes: Positive for visual disturbance (diplopia).   Respiratory: Negative. "    Cardiovascular: Negative.    Gastrointestinal: Negative.    Musculoskeletal: Negative.    Integumentary:  Negative.   Neurological: Positive for dizziness, disturbances in coordination and coordination difficulties.   Hematological: Negative.    Psychiatric/Behavioral: Negative.          Objective:      Physical Exam  Vitals signs and nursing note reviewed.   Constitutional:       General: He is not in acute distress.     Appearance: He is well-developed. He is not ill-appearing or diaphoretic.   HENT:      Head: Normocephalic and atraumatic.      Right Ear: Hearing, tympanic membrane, ear canal and external ear normal. No middle ear effusion. Tympanic membrane is not erythematous.      Left Ear: Hearing, tympanic membrane, ear canal and external ear normal.  No middle ear effusion. Tympanic membrane is not erythematous.      Nose: Nose normal.      Mouth/Throat:      Pharynx: Uvula midline.   Eyes:      General: Lids are normal. No scleral icterus.        Right eye: No discharge.         Left eye: No discharge.   Neck:      Musculoskeletal: Normal range of motion and neck supple.      Trachea: Trachea normal. No tracheal deviation.   Cardiovascular:      Rate and Rhythm: Normal rate.   Pulmonary:      Effort: Pulmonary effort is normal. No respiratory distress.      Breath sounds: No stridor. No wheezing.   Musculoskeletal: Normal range of motion.   Skin:     General: Skin is warm and dry.      Coloration: Skin is not pale.   Neurological:      Mental Status: He is alert and oriented to person, place, and time.      Coordination: Coordination normal.      Gait: Gait normal.   Psychiatric:         Speech: Speech normal.         Behavior: Behavior normal. Behavior is cooperative.         Thought Content: Thought content normal.         Judgment: Judgment normal.         Assessment:       1. Meniere's disease of left ear      Asymmetrical SNHL  TMJ  Right-sided HL is essentially on at 1 & 4 kHz  Left-sided HL is  "moderately-severe to severe across all frequencies with no usable speech discrimination  Tinnitus AS    Plan:     Patient wants PET AS. Explained patient no MCKENZIE, HL is not conductive, no retraction; therefore nothing warranting PET at this time.     Discussed seeing Dr. Neil Ford to discuss CI/BAHA evaluation.  Discussed TMJ; MRI then see oral surgeon. He expressed frustration because he has seen a variety of ENTs and dentists and they all seem to be "passing the alejandro." Discussed some neurologist/pain management doctors offer steroid injections into TMJ, but typically ENTs do not treat.   Greater than 50% face-to-face counseling of 50 minute visit spent in review of all of above.       "

## 2020-11-16 NOTE — LETTER
November 16, 2020      Jerome Arnold MD  [address] [facility]  [place] [address]           [facility]  [address] [place] [address]  Phone: [phone]  Fax: [phone]          Patient: Hector Rao   MR Number: [id number]   YOB: 1965   Date of Visit: 11/16/2020       Dear Dr. Jerome Arnold:    Thank you for referring Hector Rao to me for evaluation. Attached you will find relevant portions of my assessment and plan of care.    If you have questions, please do not hesitate to call me. I look forward to following Hector Rao along with you.    Sincerely,    Columba Padilla, NP    Enclosure  CC:  No Recipients    If you would like to receive this communication electronically, please contact [email or web address] or [phone] to request more information on [facility] Link access.    For providers and/or their staff who would like to refer a patient to [facility], please contact us through our one-stop-shop provider referral line, Mook Sharma, at [phone].    If you feel you have received this communication in error or would no longer like to receive these types of communications, please e-mail [email or web address]

## 2020-11-20 ENCOUNTER — PATIENT MESSAGE (OUTPATIENT)
Dept: FAMILY MEDICINE | Facility: CLINIC | Age: 55
End: 2020-11-20

## 2020-11-20 DIAGNOSIS — R68.84 JAW PAIN: Primary | ICD-10-CM

## 2020-11-20 DIAGNOSIS — M54.2 CERVICALGIA: ICD-10-CM

## 2020-11-20 DIAGNOSIS — R42 VERTIGO: ICD-10-CM

## 2020-11-20 DIAGNOSIS — H93.12 TINNITUS OF LEFT EAR: ICD-10-CM

## 2020-12-12 ENCOUNTER — PATIENT MESSAGE (OUTPATIENT)
Dept: FAMILY MEDICINE | Facility: CLINIC | Age: 55
End: 2020-12-12

## 2021-03-23 ENCOUNTER — TELEPHONE (OUTPATIENT)
Dept: PODIATRY | Facility: CLINIC | Age: 56
End: 2021-03-23

## 2021-11-22 ENCOUNTER — PATIENT MESSAGE (OUTPATIENT)
Dept: FAMILY MEDICINE | Facility: CLINIC | Age: 56
End: 2021-11-22
Payer: COMMERCIAL

## 2021-12-13 ENCOUNTER — PATIENT OUTREACH (OUTPATIENT)
Dept: ADMINISTRATIVE | Facility: HOSPITAL | Age: 56
End: 2021-12-13
Payer: COMMERCIAL

## 2021-12-29 ENCOUNTER — OFFICE VISIT (OUTPATIENT)
Dept: FAMILY MEDICINE | Facility: CLINIC | Age: 56
End: 2021-12-29
Payer: COMMERCIAL

## 2021-12-29 ENCOUNTER — LAB VISIT (OUTPATIENT)
Dept: LAB | Facility: HOSPITAL | Age: 56
End: 2021-12-29
Attending: FAMILY MEDICINE
Payer: COMMERCIAL

## 2021-12-29 VITALS
SYSTOLIC BLOOD PRESSURE: 130 MMHG | TEMPERATURE: 99 F | HEIGHT: 65 IN | WEIGHT: 157 LBS | DIASTOLIC BLOOD PRESSURE: 80 MMHG | BODY MASS INDEX: 26.16 KG/M2 | HEART RATE: 80 BPM

## 2021-12-29 DIAGNOSIS — G45.9 TRANSIENT CEREBRAL ISCHEMIA, UNSPECIFIED TYPE: ICD-10-CM

## 2021-12-29 DIAGNOSIS — J45.21 MILD INTERMITTENT ASTHMA WITH ACUTE EXACERBATION: Chronic | ICD-10-CM

## 2021-12-29 DIAGNOSIS — F17.210 NICOTINE DEPENDENCE, CIGARETTES, UNCOMPLICATED: ICD-10-CM

## 2021-12-29 DIAGNOSIS — Z00.00 ANNUAL PHYSICAL EXAM: ICD-10-CM

## 2021-12-29 DIAGNOSIS — J44.89 ASTHMA WITH COPD: Chronic | ICD-10-CM

## 2021-12-29 DIAGNOSIS — Z00.00 ANNUAL PHYSICAL EXAM: Primary | ICD-10-CM

## 2021-12-29 DIAGNOSIS — J45.20 INTERMITTENT ASTHMA, UNCOMPLICATED: ICD-10-CM

## 2021-12-29 DIAGNOSIS — E78.5 HYPERLIPIDEMIA, UNSPECIFIED HYPERLIPIDEMIA TYPE: ICD-10-CM

## 2021-12-29 DIAGNOSIS — I10 PRIMARY HYPERTENSION: ICD-10-CM

## 2021-12-29 PROCEDURE — 1159F PR MEDICATION LIST DOCUMENTED IN MEDICAL RECORD: ICD-10-PCS | Mod: CPTII,S$GLB,, | Performed by: FAMILY MEDICINE

## 2021-12-29 PROCEDURE — 99396 PR PREVENTIVE VISIT,EST,40-64: ICD-10-PCS | Mod: S$GLB,,, | Performed by: FAMILY MEDICINE

## 2021-12-29 PROCEDURE — 3075F PR MOST RECENT SYSTOLIC BLOOD PRESS GE 130-139MM HG: ICD-10-PCS | Mod: CPTII,S$GLB,, | Performed by: FAMILY MEDICINE

## 2021-12-29 PROCEDURE — 3079F DIAST BP 80-89 MM HG: CPT | Mod: CPTII,S$GLB,, | Performed by: FAMILY MEDICINE

## 2021-12-29 PROCEDURE — 99999 PR PBB SHADOW E&M-EST. PATIENT-LVL III: ICD-10-PCS | Mod: PBBFAC,,, | Performed by: FAMILY MEDICINE

## 2021-12-29 PROCEDURE — 1159F MED LIST DOCD IN RCRD: CPT | Mod: CPTII,S$GLB,, | Performed by: FAMILY MEDICINE

## 2021-12-29 PROCEDURE — 80061 LIPID PANEL: CPT | Performed by: FAMILY MEDICINE

## 2021-12-29 PROCEDURE — 84153 ASSAY OF PSA TOTAL: CPT | Performed by: FAMILY MEDICINE

## 2021-12-29 PROCEDURE — 3008F BODY MASS INDEX DOCD: CPT | Mod: CPTII,S$GLB,, | Performed by: FAMILY MEDICINE

## 2021-12-29 PROCEDURE — 3079F PR MOST RECENT DIASTOLIC BLOOD PRESSURE 80-89 MM HG: ICD-10-PCS | Mod: CPTII,S$GLB,, | Performed by: FAMILY MEDICINE

## 2021-12-29 PROCEDURE — 99396 PREV VISIT EST AGE 40-64: CPT | Mod: S$GLB,,, | Performed by: FAMILY MEDICINE

## 2021-12-29 PROCEDURE — 36415 COLL VENOUS BLD VENIPUNCTURE: CPT | Mod: PO | Performed by: FAMILY MEDICINE

## 2021-12-29 PROCEDURE — 3008F PR BODY MASS INDEX (BMI) DOCUMENTED: ICD-10-PCS | Mod: CPTII,S$GLB,, | Performed by: FAMILY MEDICINE

## 2021-12-29 PROCEDURE — 3075F SYST BP GE 130 - 139MM HG: CPT | Mod: CPTII,S$GLB,, | Performed by: FAMILY MEDICINE

## 2021-12-29 PROCEDURE — 80053 COMPREHEN METABOLIC PANEL: CPT | Performed by: FAMILY MEDICINE

## 2021-12-29 PROCEDURE — 99999 PR PBB SHADOW E&M-EST. PATIENT-LVL III: CPT | Mod: PBBFAC,,, | Performed by: FAMILY MEDICINE

## 2021-12-29 RX ORDER — IPRATROPIUM BROMIDE AND ALBUTEROL SULFATE 2.5; .5 MG/3ML; MG/3ML
SOLUTION RESPIRATORY (INHALATION)
Qty: 360 ML | Refills: 11 | Status: SHIPPED | OUTPATIENT
Start: 2021-12-29 | End: 2023-09-22 | Stop reason: SDUPTHER

## 2021-12-29 RX ORDER — ALBUTEROL SULFATE 90 UG/1
2 AEROSOL, METERED RESPIRATORY (INHALATION) EVERY 6 HOURS PRN
Qty: 54 G | Refills: 11 | Status: SHIPPED | OUTPATIENT
Start: 2021-12-29

## 2021-12-29 RX ORDER — UMECLIDINIUM BROMIDE AND VILANTEROL TRIFENATATE 62.5; 25 UG/1; UG/1
1 POWDER RESPIRATORY (INHALATION) DAILY
Qty: 30 EACH | Refills: 11 | Status: SHIPPED | OUTPATIENT
Start: 2021-12-29 | End: 2023-03-06

## 2021-12-29 RX ORDER — ATORVASTATIN CALCIUM 20 MG/1
20 TABLET, FILM COATED ORAL DAILY
Qty: 30 TABLET | Refills: 11 | Status: SHIPPED | OUTPATIENT
Start: 2021-12-29 | End: 2023-01-09

## 2021-12-30 LAB
ALBUMIN SERPL BCP-MCNC: 4.1 G/DL (ref 3.5–5.2)
ALP SERPL-CCNC: 61 U/L (ref 55–135)
ALT SERPL W/O P-5'-P-CCNC: 22 U/L (ref 10–44)
ANION GAP SERPL CALC-SCNC: 9 MMOL/L (ref 8–16)
AST SERPL-CCNC: 19 U/L (ref 10–40)
BILIRUB SERPL-MCNC: 0.6 MG/DL (ref 0.1–1)
BUN SERPL-MCNC: 13 MG/DL (ref 6–20)
CALCIUM SERPL-MCNC: 9.4 MG/DL (ref 8.7–10.5)
CHLORIDE SERPL-SCNC: 105 MMOL/L (ref 95–110)
CHOLEST SERPL-MCNC: 192 MG/DL (ref 120–199)
CHOLEST/HDLC SERPL: 4 {RATIO} (ref 2–5)
CO2 SERPL-SCNC: 23 MMOL/L (ref 23–29)
COMPLEXED PSA SERPL-MCNC: 0.45 NG/ML (ref 0–4)
CREAT SERPL-MCNC: 0.9 MG/DL (ref 0.5–1.4)
EST. GFR  (AFRICAN AMERICAN): >60 ML/MIN/1.73 M^2
EST. GFR  (NON AFRICAN AMERICAN): >60 ML/MIN/1.73 M^2
GLUCOSE SERPL-MCNC: 77 MG/DL (ref 70–110)
HDLC SERPL-MCNC: 48 MG/DL (ref 40–75)
HDLC SERPL: 25 % (ref 20–50)
LDLC SERPL CALC-MCNC: 124.4 MG/DL (ref 63–159)
NONHDLC SERPL-MCNC: 144 MG/DL
POTASSIUM SERPL-SCNC: 4 MMOL/L (ref 3.5–5.1)
PROT SERPL-MCNC: 6.9 G/DL (ref 6–8.4)
SODIUM SERPL-SCNC: 137 MMOL/L (ref 136–145)
TRIGL SERPL-MCNC: 98 MG/DL (ref 30–150)

## 2021-12-30 NOTE — PROGRESS NOTES
I have reviewed the labs and am recommending the following:    CMP/BMP NORMAL-The electrolytes all appear stable at this time.  This includes kidney functions along with routine electrolytes like sugar, potassium and sodium.  If it was a CMP test, the liver enzymes were noted to be stable also.  LIPID NORMAL SCREEN-The cholesterol panel screening showed levels that are considered at target at this time.  Recheck each year.   PSA NORMAL-The PSA screening for prostate cancer is normal.  Recheck annually.      Dr. Jerome Arnold

## 2022-01-05 ENCOUNTER — HOSPITAL ENCOUNTER (OUTPATIENT)
Dept: RADIOLOGY | Facility: HOSPITAL | Age: 57
Discharge: HOME | End: 2022-01-05
Attending: FAMILY MEDICINE
Payer: COMMERCIAL

## 2022-01-05 DIAGNOSIS — F17.210 NICOTINE DEPENDENCE, CIGARETTES, UNCOMPLICATED: ICD-10-CM

## 2022-01-05 PROCEDURE — 71271 CT CHEST LUNG SCREENING LOW DOSE: ICD-10-PCS | Mod: 26,,, | Performed by: RADIOLOGY

## 2022-01-05 PROCEDURE — 71271 CT THORAX LUNG CANCER SCR C-: CPT | Mod: TC,PO

## 2022-01-05 PROCEDURE — 71271 CT THORAX LUNG CANCER SCR C-: CPT | Mod: 26,,, | Performed by: RADIOLOGY

## 2022-01-06 NOTE — PROGRESS NOTES
You do not have any new lung the nodules and there does not appear to be any enlargement of the ones that are present in your lungs.  There is evidence of emphysema throughout the lungs.  It is recommended to repeat this every year for screening purposes.    Your heart does have calcifications present in the aorta and in the great vessel.  There are also calcifications present in the arteries of the heart.  This can be a sign that shows you are at increased risk of a heart attack and because of that, I recommend that you see a cardiologist for evaluation and recommendations.  They often perform cardiac stress testing for screening.  This might be a good idea given the fact that you have these abnormalities seen on your CT scan which included  views of the heart.  Please let me know if this is agreeable and we will place a referral to a cardiologist.

## 2022-01-15 ENCOUNTER — TELEPHONE (OUTPATIENT)
Dept: FAMILY MEDICINE | Facility: CLINIC | Age: 57
End: 2022-01-15
Payer: COMMERCIAL

## 2022-01-15 DIAGNOSIS — I70.0 AORTIC CALCIFICATION: Primary | ICD-10-CM

## 2022-01-15 NOTE — TELEPHONE ENCOUNTER
----- Message from Jerome Arnold MD sent at 1/5/2022  6:23 PM CST -----  You do not have any new lung the nodules and there does not appear to be any enlargement of the ones that are present in your lungs.  There is evidence of emphysema throughout the lungs.  It is recommended to repeat this every year for screening purposes.    Your heart does have calcifications present in the aorta and in the great vessel.  There are also calcifications present in the arteries of the heart.  This can be a sign that shows you are at increased risk of a heart attack and because of that, I recommend that you see a cardiologist for evaluation and recommendations.  They often perform cardiac stress testing for screening.  This might be a good idea given the fact that you have these abnormalities seen on your CT scan which included  views of the heart.  Please let me know if this is agreeable and we will place a referral to a cardiologist.

## 2022-01-15 NOTE — TELEPHONE ENCOUNTER
I have signed for the following orders AND/OR meds.  Please call the patient and ask the patient to schedule the testing AND/OR inform about any medications that were sent.      Orders Placed This Encounter   Procedures    Ambulatory referral/consult to Cardiology     Standing Status:   Future     Standing Expiration Date:   2/15/2023     Referral Priority:   Routine     Referral Type:   Consultation     Referral Reason:   Specialty Services Required     Requested Specialty:   Cardiology     Number of Visits Requested:   1

## 2022-01-19 ENCOUNTER — OFFICE VISIT (OUTPATIENT)
Dept: CARDIOLOGY | Facility: CLINIC | Age: 57
End: 2022-01-19
Payer: COMMERCIAL

## 2022-01-19 VITALS
DIASTOLIC BLOOD PRESSURE: 80 MMHG | SYSTOLIC BLOOD PRESSURE: 160 MMHG | WEIGHT: 160.31 LBS | OXYGEN SATURATION: 98 % | HEIGHT: 65 IN | HEART RATE: 82 BPM | BODY MASS INDEX: 26.71 KG/M2

## 2022-01-19 DIAGNOSIS — F17.210 NICOTINE DEPENDENCE, CIGARETTES, UNCOMPLICATED: ICD-10-CM

## 2022-01-19 DIAGNOSIS — I20.89 ANGINAL EQUIVALENT: ICD-10-CM

## 2022-01-19 DIAGNOSIS — I10 PRIMARY HYPERTENSION: ICD-10-CM

## 2022-01-19 DIAGNOSIS — I70.0 AORTIC CALCIFICATION: ICD-10-CM

## 2022-01-19 DIAGNOSIS — E78.00 PURE HYPERCHOLESTEROLEMIA: ICD-10-CM

## 2022-01-19 DIAGNOSIS — G45.9 TIA (TRANSIENT ISCHEMIC ATTACK): Primary | ICD-10-CM

## 2022-01-19 DIAGNOSIS — J44.89 ASTHMA WITH COPD: Chronic | ICD-10-CM

## 2022-01-19 DIAGNOSIS — R06.02 SOB (SHORTNESS OF BREATH): ICD-10-CM

## 2022-01-19 PROCEDURE — 1160F RVW MEDS BY RX/DR IN RCRD: CPT | Mod: CPTII,S$GLB,, | Performed by: INTERNAL MEDICINE

## 2022-01-19 PROCEDURE — 1159F PR MEDICATION LIST DOCUMENTED IN MEDICAL RECORD: ICD-10-PCS | Mod: CPTII,S$GLB,, | Performed by: INTERNAL MEDICINE

## 2022-01-19 PROCEDURE — 3079F DIAST BP 80-89 MM HG: CPT | Mod: CPTII,S$GLB,, | Performed by: INTERNAL MEDICINE

## 2022-01-19 PROCEDURE — 1159F MED LIST DOCD IN RCRD: CPT | Mod: CPTII,S$GLB,, | Performed by: INTERNAL MEDICINE

## 2022-01-19 PROCEDURE — 3077F SYST BP >= 140 MM HG: CPT | Mod: CPTII,S$GLB,, | Performed by: INTERNAL MEDICINE

## 2022-01-19 PROCEDURE — 3079F PR MOST RECENT DIASTOLIC BLOOD PRESSURE 80-89 MM HG: ICD-10-PCS | Mod: CPTII,S$GLB,, | Performed by: INTERNAL MEDICINE

## 2022-01-19 PROCEDURE — 3008F BODY MASS INDEX DOCD: CPT | Mod: CPTII,S$GLB,, | Performed by: INTERNAL MEDICINE

## 2022-01-19 PROCEDURE — 99204 OFFICE O/P NEW MOD 45 MIN: CPT | Mod: S$GLB,,, | Performed by: INTERNAL MEDICINE

## 2022-01-19 PROCEDURE — 3008F PR BODY MASS INDEX (BMI) DOCUMENTED: ICD-10-PCS | Mod: CPTII,S$GLB,, | Performed by: INTERNAL MEDICINE

## 2022-01-19 PROCEDURE — 3077F PR MOST RECENT SYSTOLIC BLOOD PRESSURE >= 140 MM HG: ICD-10-PCS | Mod: CPTII,S$GLB,, | Performed by: INTERNAL MEDICINE

## 2022-01-19 PROCEDURE — 99999 PR PBB SHADOW E&M-EST. PATIENT-LVL III: ICD-10-PCS | Mod: PBBFAC,,, | Performed by: INTERNAL MEDICINE

## 2022-01-19 PROCEDURE — 99204 PR OFFICE/OUTPT VISIT, NEW, LEVL IV, 45-59 MIN: ICD-10-PCS | Mod: S$GLB,,, | Performed by: INTERNAL MEDICINE

## 2022-01-19 PROCEDURE — 99999 PR PBB SHADOW E&M-EST. PATIENT-LVL III: CPT | Mod: PBBFAC,,, | Performed by: INTERNAL MEDICINE

## 2022-01-19 PROCEDURE — 1160F PR REVIEW ALL MEDS BY PRESCRIBER/CLIN PHARMACIST DOCUMENTED: ICD-10-PCS | Mod: CPTII,S$GLB,, | Performed by: INTERNAL MEDICINE

## 2022-01-19 NOTE — PROGRESS NOTES
Subjective:   Patient ID:  Hector Rao is a 56 y.o. male who presents for follow-up of Dizziness and Shortness of Breath  Pt referred for abnormal findings on CT scan- aortic and coronary calcification.  Pt denies CP,pt states  SOB though. Pt a long time smoker. Pt with chronic SOB but worse.    CRF- smoker, family hx of CVA, HLP    Hypertension  This is a chronic problem. The current episode started more than 1 year ago. The problem has been gradually improving since onset. The problem is controlled. Associated symptoms include shortness of breath. Pertinent negatives include no chest pain or palpitations. Past treatments include angiotensin blockers. The current treatment provides moderate improvement. There are no compliance problems.    Hyperlipidemia  This is a chronic problem. The current episode started more than 1 year ago. The problem is controlled. Associated symptoms include shortness of breath. Pertinent negatives include no chest pain. Current antihyperlipidemic treatment includes statins. The current treatment provides moderate improvement of lipids. There are no compliance problems.  Risk factors for coronary artery disease include hypertension, dyslipidemia and male sex.   Shortness of Breath  This is a chronic problem. The current episode started more than 1 year ago. The problem occurs intermittently. The problem has been gradually worsening. Pertinent negatives include no chest pain or leg swelling. The symptoms are aggravated by any activity. He has tried rest and beta agonist inhalers for the symptoms. The treatment provided mild relief.       Review of Systems   Constitutional: Negative. Negative for weight gain.   HENT: Negative.    Eyes: Negative.    Cardiovascular: Negative.  Negative for chest pain, leg swelling and palpitations.   Respiratory: Positive for shortness of breath. Negative for chest tightness.    Endocrine: Negative.    Hematologic/Lymphatic: Negative.    Skin: Negative.     Musculoskeletal: Negative for muscle weakness.   Gastrointestinal: Negative.    Genitourinary: Negative.    Neurological: Negative.  Negative for dizziness.   Psychiatric/Behavioral: Negative.    Allergic/Immunologic: Negative.      Family History   Problem Relation Age of Onset    Stroke Mother     Hypertension Mother     Stroke Father     Hypertension Father     Stroke Unknown         grandmother/grandfather     Past Medical History:   Diagnosis Date    Anxiety     Asthma, acute     Hyperlipidemia 7/31/2017    Joint pain     TIA (transient ischemic attack) 7/31/2017    TMJ (dislocation of temporomandibular joint)      Social History     Socioeconomic History    Marital status:      Spouse name: Myrtle    Number of children: 2   Occupational History    Occupation: Electronic Tech     Employer: [employer]     Employer:  [employer]   Tobacco Use    Smoking status: Current Every Day Smoker     Packs/day: 2.00     Years: 41.00     Pack years: 82.00     Types: Cigarettes     Start date: 1970    Smokeless tobacco: Never Used   Substance and Sexual Activity    Alcohol use: Yes     Alcohol/week: 2.0 standard drinks     Types: 2 Cans of beer per week     Comment: He is a reformed alcoholic/ 2 beers per week    Drug use: Yes     Comment: He used to use marijuana.    Sexual activity: Yes     Partners: Female     Current Outpatient Medications on File Prior to Visit   Medication Sig Dispense Refill    albuterol (VENTOLIN HFA) 90 mcg/actuation inhaler Inhale 2 puffs into the lungs every 6 (six) hours as needed. Rescue 54 g 11    albuterol-ipratropium (DUO-NEB) 2.5 mg-0.5 mg/3 mL nebulizer solution INHALE 1 VIAL VIA NEBULIZER EVERY SIX HOURS IF NEEDED FOR WHEEZING 360 mL 11    aspirin (ECOTRIN) 81 MG EC tablet Take 1 tablet (81 mg total) by mouth once daily. 100 tablet 3    atorvastatin (LIPITOR) 20 MG tablet Take 1 tablet (20 mg total) by mouth once daily. 30 tablet 11     chlorzoxazone (PARAFON FORTE) 500 mg Tab Take 500 mg by mouth 3 (three) times daily.  1    umeclidinium-vilanteroL (ANORO ELLIPTA) 62.5-25 mcg/actuation DsDv Inhale 1 puff into the lungs once daily. Controller 30 each 11     No current facility-administered medications on file prior to visit.     Review of patient's allergies indicates:   Allergen Reactions    Codeine Nausea And Vomiting     Other reaction(s): Nausea    Lexapro [escitalopram oxalate]      Erectile dysfunction.       Objective:     Physical Exam  Vitals and nursing note reviewed.   Constitutional:       Appearance: He is well-developed and well-nourished.   HENT:      Head: Normocephalic and atraumatic.   Eyes:      Conjunctiva/sclera: Conjunctivae normal.      Pupils: Pupils are equal, round, and reactive to light.   Cardiovascular:      Rate and Rhythm: Normal rate and regular rhythm.      Pulses: Intact distal pulses.      Heart sounds: Normal heart sounds.   Pulmonary:      Effort: Pulmonary effort is normal.      Breath sounds: Normal breath sounds.   Abdominal:      General: Bowel sounds are normal.      Palpations: Abdomen is soft.   Musculoskeletal:      Cervical back: Normal range of motion and neck supple.   Skin:     General: Skin is warm and dry.   Neurological:      Mental Status: He is alert and oriented to person, place, and time.   Psychiatric:         Mood and Affect: Mood and affect normal.         Assessment:     1. TIA (transient ischemic attack)    2. Aortic calcification    3. Asthma with COPD / Emphysema    4. Primary hypertension    5. Pure hypercholesterolemia    6. Nicotine dependence, cigarettes, uncomplicated        Plan:     TIA (transient ischemic attack)    Aortic calcification  -     Ambulatory referral/consult to Cardiology    Asthma with COPD / Emphysema    Primary hypertension    Pure hypercholesterolemia    Nicotine dependence, cigarettes, uncomplicated      Continue losartan-htn  Continue statin-hlp  Echo and  stress test

## 2022-01-23 NOTE — PROGRESS NOTES
The patient's last blood pressure was high at the specialist's office.  Please follow up with the patient at home to get a better reading if she is truly controlled there.  BP Readings from Last 3 Encounters:   01/19/22 (!) 160/80   12/29/21 130/80   11/09/20 121/74     Health maintenance that is due is listed below:    There are no preventive care reminders to display for this patient.

## 2022-02-01 DIAGNOSIS — I10 ESSENTIAL HYPERTENSION: ICD-10-CM

## 2022-02-01 RX ORDER — LOSARTAN POTASSIUM 25 MG/1
25 TABLET ORAL DAILY
Qty: 30 TABLET | Refills: 0 | Status: SHIPPED | OUTPATIENT
Start: 2022-02-01 | End: 2022-03-09

## 2022-02-01 NOTE — TELEPHONE ENCOUNTER
The patient requested a refill of BP meds.  The last 3 BP's are as follows:  BP Readings from Last 3 Encounters:   01/19/22 (!) 160/80   12/29/21 130/80   11/09/20 121/74     The last BP was high so please book the patient for a visit with the Clinical Care Coordinator,  Ancillary Nurse, NP or me to recheck the blood pressure and to adjust medications if needed.

## 2022-02-01 NOTE — TELEPHONE ENCOUNTER
No new care gaps identified.  Powered by [employer]. Reference number: [id number].   2/01/2022 10:00:24 AM CST

## 2022-02-02 NOTE — TELEPHONE ENCOUNTER
Spoke with patient and he states he does have a B/P machine at home to take his reading. He is at work at this time. Advised I would have our care coordinator reach out to coordinate a home reading for updating his chart.

## 2022-02-21 ENCOUNTER — HOSPITAL ENCOUNTER (OUTPATIENT)
Dept: CARDIOLOGY | Facility: HOSPITAL | Age: 57
Discharge: HOME | End: 2022-02-21
Attending: INTERNAL MEDICINE
Payer: COMMERCIAL

## 2022-02-21 VITALS
BODY MASS INDEX: 26.66 KG/M2 | DIASTOLIC BLOOD PRESSURE: 80 MMHG | WEIGHT: 160 LBS | HEART RATE: 88 BPM | SYSTOLIC BLOOD PRESSURE: 160 MMHG | HEIGHT: 65 IN

## 2022-02-21 VITALS
WEIGHT: 160 LBS | HEIGHT: 66 IN | HEART RATE: 69 BPM | SYSTOLIC BLOOD PRESSURE: 137 MMHG | BODY MASS INDEX: 25.71 KG/M2 | DIASTOLIC BLOOD PRESSURE: 87 MMHG

## 2022-02-21 DIAGNOSIS — R06.02 SOB (SHORTNESS OF BREATH): ICD-10-CM

## 2022-02-21 DIAGNOSIS — I20.89 ANGINAL EQUIVALENT: ICD-10-CM

## 2022-02-21 LAB
AORTIC ROOT ANNULUS: 3.08 CM
AV INDEX (PROSTH): 0.59
AV MEAN GRADIENT: 4 MMHG
AV PEAK GRADIENT: 8 MMHG
AV VALVE AREA: 2.05 CM2
AV VELOCITY RATIO: 0.58
BSA FOR ECHO PROCEDURE: 1.82 M2
CV ECHO LV RWT: 0.64 CM
CV STRESS BASE HR: 69 BPM
DIASTOLIC BLOOD PRESSURE: 87 MMHG
DOP CALC AO PEAK VEL: 1.37 M/S
DOP CALC AO VTI: 30.8 CM
DOP CALC LVOT AREA: 3.5 CM2
DOP CALC LVOT DIAMETER: 2.1 CM
DOP CALC LVOT PEAK VEL: 0.8 M/S
DOP CALC LVOT STROKE VOLUME: 63.01 CM3
DOP CALCLVOT PEAK VEL VTI: 18.2 CM
E WAVE DECELERATION TIME: 139.42 MSEC
E/A RATIO: 1.38
E/E' RATIO: 6.19 M/S
ECHO EF ESTIMATED: 56 %
ECHO LV POSTERIOR WALL: 1.19 CM (ref 0.6–1.1)
EJECTION FRACTION: 60 %
FRACTIONAL SHORTENING: 29 % (ref 28–44)
INTERVENTRICULAR SEPTUM: 0.97 CM (ref 0.6–1.1)
IVRT: 119.89 MSEC
LA MAJOR: 4.92 CM
LA MINOR: 5.12 CM
LA WIDTH: 2.64 CM
LEFT ATRIUM SIZE: 2.83 CM
LEFT ATRIUM VOLUME INDEX MOD: 8.2 ML/M2
LEFT ATRIUM VOLUME INDEX: 17.7 ML/M2
LEFT ATRIUM VOLUME MOD: 14.69 CM3
LEFT ATRIUM VOLUME: 31.87 CM3
LEFT INTERNAL DIMENSION IN SYSTOLE: 2.63 CM (ref 2.1–4)
LEFT VENTRICLE DIASTOLIC VOLUME INDEX: 32.09 ML/M2
LEFT VENTRICLE DIASTOLIC VOLUME: 57.76 ML
LEFT VENTRICLE MASS INDEX: 70 G/M2
LEFT VENTRICLE SYSTOLIC VOLUME INDEX: 14 ML/M2
LEFT VENTRICLE SYSTOLIC VOLUME: 25.21 ML
LEFT VENTRICULAR INTERNAL DIMENSION IN DIASTOLE: 3.69 CM (ref 3.5–6)
LEFT VENTRICULAR MASS: 125.37 G
LV LATERAL E/E' RATIO: 5.91 M/S
LV SEPTAL E/E' RATIO: 6.5 M/S
LVOT MG: 1.47 MMHG
LVOT MV: 0.58 CM/S
MV PEAK A VEL: 0.47 M/S
MV PEAK E VEL: 0.65 M/S
MV STENOSIS PRESSURE HALF TIME: 40.43 MS
MV VALVE AREA P 1/2 METHOD: 5.44 CM2
NUC REST EJECTION FRACTION: 67
NUC STRESS EJECTION FRACTION: 67 %
OHS CV CPX 1 MINUTE RECOVERY HEART RATE: 1 BPM
OHS CV CPX 85 PERCENT MAX PREDICTED HEART RATE MALE: 138
OHS CV CPX ESTIMATED METS: 1
OHS CV CPX MAX PREDICTED HEART RATE: 162
OHS CV CPX PATIENT IS FEMALE: 0
OHS CV CPX PATIENT IS MALE: 1
OHS CV CPX PEAK DIASTOLIC BLOOD PRESSURE: 91 MMHG
OHS CV CPX PEAK HEAR RATE: 117 BPM
OHS CV CPX PEAK RATE PRESSURE PRODUCT: NORMAL
OHS CV CPX PEAK SYSTOLIC BLOOD PRESSURE: 195 MMHG
OHS CV CPX PERCENT MAX PREDICTED HEART RATE ACHIEVED: 72
OHS CV CPX RATE PRESSURE PRODUCT PRESENTING: 9453
PULM VEIN S/D RATIO: 1.49
PV PEAK D VEL: 0.37 M/S
PV PEAK S VEL: 0.55 M/S
RA MAJOR: 4.31 CM
RA PRESSURE: 3 MMHG
RA WIDTH: 2.77 CM
RIGHT VENTRICULAR END-DIASTOLIC DIMENSION: 1.6 CM
SINUS: 2.42 CM
STJ: 2.7 CM
STRESS ECHO POST EXERCISE DUR MIN: 1 MINUTES
STRESS ECHO POST EXERCISE DUR SEC: 30 SECONDS
SYSTOLIC BLOOD PRESSURE: 137 MMHG
TDI LATERAL: 0.11 M/S
TDI SEPTAL: 0.1 M/S
TDI: 0.11 M/S
TRICUSPID ANNULAR PLANE SYSTOLIC EXCURSION: 2.17 CM

## 2022-02-21 PROCEDURE — 93018 CV STRESS TEST I&R ONLY: CPT | Mod: ,,, | Performed by: INTERNAL MEDICINE

## 2022-02-21 PROCEDURE — 78452 NUCLEAR STRESS - 3RD PARTY (CUPID ONLY): ICD-10-PCS | Mod: 26,,, | Performed by: INTERNAL MEDICINE

## 2022-02-21 PROCEDURE — 93306 ECHO (CUPID ONLY): ICD-10-PCS | Mod: 26,,, | Performed by: INTERNAL MEDICINE

## 2022-02-21 PROCEDURE — 93016 NUCLEAR STRESS - 3RD PARTY (CUPID ONLY): ICD-10-PCS | Mod: ,,, | Performed by: INTERNAL MEDICINE

## 2022-02-21 PROCEDURE — 78452 HT MUSCLE IMAGE SPECT MULT: CPT | Mod: 26,,, | Performed by: INTERNAL MEDICINE

## 2022-02-21 PROCEDURE — 63600175 PHARM REV CODE 636 W HCPCS: Mod: PO | Performed by: INTERNAL MEDICINE

## 2022-02-21 PROCEDURE — 93016 CV STRESS TEST SUPVJ ONLY: CPT | Mod: ,,, | Performed by: INTERNAL MEDICINE

## 2022-02-21 PROCEDURE — 93018 NUCLEAR STRESS - 3RD PARTY (CUPID ONLY): ICD-10-PCS | Mod: ,,, | Performed by: INTERNAL MEDICINE

## 2022-02-21 PROCEDURE — 93306 TTE W/DOPPLER COMPLETE: CPT | Mod: PO

## 2022-02-21 PROCEDURE — 93306 TTE W/DOPPLER COMPLETE: CPT | Mod: 26,,, | Performed by: INTERNAL MEDICINE

## 2022-02-21 RX ORDER — REGADENOSON 0.08 MG/ML
0.4 INJECTION, SOLUTION INTRAVENOUS ONCE
Status: COMPLETED | OUTPATIENT
Start: 2022-02-21 | End: 2022-02-21

## 2022-02-21 RX ADMIN — REGADENOSON 0.4 MG: 0.08 INJECTION, SOLUTION INTRAVENOUS at 10:02

## 2022-02-22 ENCOUNTER — TELEPHONE (OUTPATIENT)
Dept: CARDIOLOGY | Facility: CLINIC | Age: 57
End: 2022-02-22
Payer: COMMERCIAL

## 2022-02-22 NOTE — TELEPHONE ENCOUNTER
The patient has been notified of this information and all questions answered.  Stress test and echo is normal. Patient verbalized understanding.

## 2022-02-22 NOTE — TELEPHONE ENCOUNTER
----- Message from Farhat Samano MD sent at 2/22/2022  5:51 AM CST -----  Please tell pt:  Stress test and echo is normal

## 2022-03-07 DIAGNOSIS — I10 ESSENTIAL HYPERTENSION: ICD-10-CM

## 2022-03-07 NOTE — TELEPHONE ENCOUNTER
No new care gaps identified.  Powered by [employer]. Reference number: [id number].   3/07/2022 11:23:22 AM CST

## 2022-03-09 RX ORDER — LOSARTAN POTASSIUM 25 MG/1
25 TABLET ORAL DAILY
Qty: 90 TABLET | Refills: 3 | Status: SHIPPED | OUTPATIENT
Start: 2022-03-09 | End: 2023-03-03

## 2022-03-09 NOTE — TELEPHONE ENCOUNTER
Refill Authorization Note   Hector Rao  is requesting a refill authorization.  Brief Assessment and Rationale for Refill:  Approve     Medication Therapy Plan:       Medication Reconciliation Completed: No   Comments:   --->Care Gap information included below if applicable.   Orders Placed This Encounter    losartan (COZAAR) 25 MG tablet      Requested Prescriptions   Signed Prescriptions Disp Refills    losartan (COZAAR) 25 MG tablet 90 tablet 3     Sig: Take 1 tablet (25 mg total) by mouth once daily.       Cardiovascular:  Angiotensin Receptor Blockers Passed - 3/7/2022 11:23 AM        Passed - Patient is at least 18 years old        Passed - Last BP in normal range within 360 days     BP Readings from Last 1 Encounters:   02/21/22 137/87               Passed - Valid encounter within last 15 months     Recent Visits  Date Type Provider Dept   12/29/21 Office Visit Jerome Arnold MD [facility]   11/09/20 Office Visit Jermoe Arnold MD [facility]   10/30/20 Office Visit Jerome Arnold MD [facility]   Showing recent visits within past 720 days and meeting all other requirements  Future Appointments  No visits were found meeting these conditions.  Showing future appointments within next 150 days and meeting all other requirements      Future Appointments              In 1 week Farhat Samano MD [facility]                Passed - Cr is 1.39 or below and within 360 days     Lab Results   Component Value Date    CREATININE 0.9 12/29/2021    CREATININE 1.0 11/03/2020    CREATININE 0.9 06/13/2019              Passed - K is 5.2 or below and within 360 days     Potassium   Date Value Ref Range Status   12/29/2021 4.0 3.5 - 5.1 mmol/L Final   11/03/2020 4.4 3.5 - 5.1 mmol/L Final   06/13/2019 3.7 3.5 - 5.1 mmol/L Final              Passed - eGFR within 360 days     Lab Results   Component Value Date    EGFRNONAA >60.0 12/29/2021    EGFRNONAA >60.0 11/03/2020     EGFRNONAA >60.0 06/13/2019                    Appointments  past 12m or future 3m with PCP    Date Provider   Last Visit   12/29/2021 Jerome Arnold MD   Next Visit   Visit date not found Jerome Arnold MD   ED visits in past 90 days: 0     Note composed:7:52 AM 03/09/2022

## 2022-03-22 ENCOUNTER — OFFICE VISIT (OUTPATIENT)
Dept: CARDIOLOGY | Facility: CLINIC | Age: 57
End: 2022-03-22
Payer: COMMERCIAL

## 2022-03-22 VITALS
HEART RATE: 80 BPM | DIASTOLIC BLOOD PRESSURE: 82 MMHG | BODY MASS INDEX: 25.69 KG/M2 | WEIGHT: 159.88 LBS | HEIGHT: 66 IN | OXYGEN SATURATION: 97 % | SYSTOLIC BLOOD PRESSURE: 138 MMHG

## 2022-03-22 DIAGNOSIS — G45.9 TIA (TRANSIENT ISCHEMIC ATTACK): Primary | ICD-10-CM

## 2022-03-22 DIAGNOSIS — R06.02 SOB (SHORTNESS OF BREATH): ICD-10-CM

## 2022-03-22 DIAGNOSIS — I10 PRIMARY HYPERTENSION: ICD-10-CM

## 2022-03-22 DIAGNOSIS — E78.00 PURE HYPERCHOLESTEROLEMIA: ICD-10-CM

## 2022-03-22 DIAGNOSIS — F17.210 NICOTINE DEPENDENCE, CIGARETTES, UNCOMPLICATED: ICD-10-CM

## 2022-03-22 PROCEDURE — 99999 PR PBB SHADOW E&M-EST. PATIENT-LVL IV: CPT | Mod: PBBFAC,,, | Performed by: INTERNAL MEDICINE

## 2022-03-22 PROCEDURE — 4010F ACE/ARB THERAPY RXD/TAKEN: CPT | Mod: CPTII,S$GLB,, | Performed by: INTERNAL MEDICINE

## 2022-03-22 PROCEDURE — 3079F PR MOST RECENT DIASTOLIC BLOOD PRESSURE 80-89 MM HG: ICD-10-PCS | Mod: CPTII,S$GLB,, | Performed by: INTERNAL MEDICINE

## 2022-03-22 PROCEDURE — 3075F SYST BP GE 130 - 139MM HG: CPT | Mod: CPTII,S$GLB,, | Performed by: INTERNAL MEDICINE

## 2022-03-22 PROCEDURE — 4010F PR ACE/ARB THEARPY RXD/TAKEN: ICD-10-PCS | Mod: CPTII,S$GLB,, | Performed by: INTERNAL MEDICINE

## 2022-03-22 PROCEDURE — 3008F BODY MASS INDEX DOCD: CPT | Mod: CPTII,S$GLB,, | Performed by: INTERNAL MEDICINE

## 2022-03-22 PROCEDURE — 3008F PR BODY MASS INDEX (BMI) DOCUMENTED: ICD-10-PCS | Mod: CPTII,S$GLB,, | Performed by: INTERNAL MEDICINE

## 2022-03-22 PROCEDURE — 3075F PR MOST RECENT SYSTOLIC BLOOD PRESS GE 130-139MM HG: ICD-10-PCS | Mod: CPTII,S$GLB,, | Performed by: INTERNAL MEDICINE

## 2022-03-22 PROCEDURE — 99214 OFFICE O/P EST MOD 30 MIN: CPT | Mod: S$GLB,,, | Performed by: INTERNAL MEDICINE

## 2022-03-22 PROCEDURE — 99214 PR OFFICE/OUTPT VISIT, EST, LEVL IV, 30-39 MIN: ICD-10-PCS | Mod: S$GLB,,, | Performed by: INTERNAL MEDICINE

## 2022-03-22 PROCEDURE — 1159F PR MEDICATION LIST DOCUMENTED IN MEDICAL RECORD: ICD-10-PCS | Mod: CPTII,S$GLB,, | Performed by: INTERNAL MEDICINE

## 2022-03-22 PROCEDURE — 99999 PR PBB SHADOW E&M-EST. PATIENT-LVL IV: ICD-10-PCS | Mod: PBBFAC,,, | Performed by: INTERNAL MEDICINE

## 2022-03-22 PROCEDURE — 1159F MED LIST DOCD IN RCRD: CPT | Mod: CPTII,S$GLB,, | Performed by: INTERNAL MEDICINE

## 2022-03-22 PROCEDURE — 3079F DIAST BP 80-89 MM HG: CPT | Mod: CPTII,S$GLB,, | Performed by: INTERNAL MEDICINE

## 2022-03-22 NOTE — PROGRESS NOTES
Subjective:   Patient ID:  Hector Rao is a 56 y.o. male who presents for follow-up of Follow-up  echo and stress test is normal. Sx were chronic SOB. Sx relieved with inhalers.  Pt has decreased smoking to 10 cigarettes /day.  Pt denies CP.    Hypertension  This is a chronic problem. The current episode started more than 1 year ago. The problem has been gradually improving since onset. The problem is controlled. Pertinent negatives include no chest pain, palpitations or shortness of breath. Past treatments include angiotensin blockers. The current treatment provides moderate improvement. There are no compliance problems.    Hyperlipidemia  This is a chronic problem. The current episode started more than 1 year ago. The problem is controlled. Pertinent negatives include no chest pain or shortness of breath. Current antihyperlipidemic treatment includes statins. The current treatment provides moderate improvement of lipids. There are no compliance problems.  Risk factors for coronary artery disease include hypertension, dyslipidemia and male sex.   Shortness of Breath  This is a chronic problem. The current episode started more than 1 year ago. The problem occurs intermittently. The problem has been gradually worsening. Pertinent negatives include no chest pain or leg swelling. The symptoms are aggravated by any activity. He has tried rest and beta agonist inhalers for the symptoms. The treatment provided mild relief.       Review of Systems   Constitutional: Negative. Negative for weight gain.   HENT: Negative.    Eyes: Negative.    Cardiovascular: Negative.  Negative for chest pain, leg swelling and palpitations.   Respiratory: Negative.  Negative for shortness of breath.    Endocrine: Negative.    Hematologic/Lymphatic: Negative.    Skin: Negative.    Musculoskeletal: Negative for muscle weakness.   Gastrointestinal: Negative.    Genitourinary: Negative.    Neurological: Negative.  Negative for dizziness.    Psychiatric/Behavioral: Negative.    Allergic/Immunologic: Negative.      Family History   Problem Relation Age of Onset    Stroke Mother     Hypertension Mother     Stroke Father     Hypertension Father     Stroke Unknown         grandmother/grandfather     Past Medical History:   Diagnosis Date    Anxiety     Asthma, acute     Hyperlipidemia 7/31/2017    Joint pain     TIA (transient ischemic attack) 7/31/2017    TMJ (dislocation of temporomandibular joint)      Social History     Socioeconomic History    Marital status:      Spouse name: Myrtle    Number of children: 2   Occupational History    Occupation: Electronic Tech     Employer: [employer]     Employer:  [employer]   Tobacco Use    Smoking status: Current Every Day Smoker     Packs/day: 2.00     Years: 41.00     Pack years: 82.00     Types: Cigarettes     Start date: 1970    Smokeless tobacco: Never Used   Substance and Sexual Activity    Alcohol use: Yes     Alcohol/week: 2.0 standard drinks     Types: 2 Cans of beer per week     Comment: He is a reformed alcoholic/ 2 beers per week    Drug use: Yes     Comment: He used to use marijuana.    Sexual activity: Yes     Partners: Female     Current Outpatient Medications on File Prior to Visit   Medication Sig Dispense Refill    albuterol (VENTOLIN HFA) 90 mcg/actuation inhaler Inhale 2 puffs into the lungs every 6 (six) hours as needed. Rescue 54 g 11    albuterol-ipratropium (DUO-NEB) 2.5 mg-0.5 mg/3 mL nebulizer solution INHALE 1 VIAL VIA NEBULIZER EVERY SIX HOURS IF NEEDED FOR WHEEZING 360 mL 11    aspirin (ECOTRIN) 81 MG EC tablet Take 1 tablet (81 mg total) by mouth once daily. 100 tablet 3    atorvastatin (LIPITOR) 20 MG tablet Take 1 tablet (20 mg total) by mouth once daily. 30 tablet 11    umeclidinium-vilanteroL (ANORO ELLIPTA) 62.5-25 mcg/actuation DsDv Inhale 1 puff into the lungs once daily. Controller 30 each 11    chlorzoxazone (PARAFON FORTE) 500  mg Tab Take 500 mg by mouth 3 (three) times daily.  1    losartan (COZAAR) 25 MG tablet Take 1 tablet (25 mg total) by mouth once daily. (Patient not taking: Reported on 3/22/2022) 90 tablet 3     No current facility-administered medications on file prior to visit.     Review of patient's allergies indicates:   Allergen Reactions    Codeine Nausea And Vomiting     Other reaction(s): Nausea    Lexapro [escitalopram oxalate]      Erectile dysfunction.       Objective:     Physical Exam  Vitals and nursing note reviewed.   Constitutional:       Appearance: He is well-developed.   HENT:      Head: Normocephalic and atraumatic.   Eyes:      Conjunctiva/sclera: Conjunctivae normal.      Pupils: Pupils are equal, round, and reactive to light.   Cardiovascular:      Rate and Rhythm: Normal rate and regular rhythm.      Pulses: Intact distal pulses.      Heart sounds: Normal heart sounds.   Pulmonary:      Effort: Pulmonary effort is normal.      Breath sounds: Normal breath sounds.   Abdominal:      General: Bowel sounds are normal.      Palpations: Abdomen is soft.   Musculoskeletal:      Cervical back: Normal range of motion and neck supple.   Skin:     General: Skin is warm and dry.   Neurological:      Mental Status: He is alert and oriented to person, place, and time.         Assessment:     1. TIA (transient ischemic attack)    2. Primary hypertension    3. Pure hypercholesterolemia    4. Nicotine dependence, cigarettes, uncomplicated    5. SOB (shortness of breath)        Plan:     TIA (transient ischemic attack)    Primary hypertension    Pure hypercholesterolemia    Nicotine dependence, cigarettes, uncomplicated    SOB (shortness of breath)      Continue losartan-htn  Continue statin-hlp  Asa- tia?

## 2022-05-13 ENCOUNTER — PATIENT MESSAGE (OUTPATIENT)
Dept: SMOKING CESSATION | Facility: CLINIC | Age: 57
End: 2022-05-13
Payer: COMMERCIAL

## 2022-05-26 ENCOUNTER — PATIENT MESSAGE (OUTPATIENT)
Dept: FAMILY MEDICINE | Facility: CLINIC | Age: 57
End: 2022-05-26
Payer: COMMERCIAL

## 2022-06-15 ENCOUNTER — HOSPITAL ENCOUNTER (OUTPATIENT)
Dept: RADIOLOGY | Facility: HOSPITAL | Age: 57
Discharge: HOME | End: 2022-06-15
Attending: PHYSICIAN ASSISTANT
Payer: COMMERCIAL

## 2022-06-15 ENCOUNTER — OFFICE VISIT (OUTPATIENT)
Dept: FAMILY MEDICINE | Facility: CLINIC | Age: 57
End: 2022-06-15
Payer: COMMERCIAL

## 2022-06-15 VITALS
WEIGHT: 160.69 LBS | HEIGHT: 65 IN | SYSTOLIC BLOOD PRESSURE: 128 MMHG | HEART RATE: 84 BPM | BODY MASS INDEX: 26.77 KG/M2 | TEMPERATURE: 99 F | DIASTOLIC BLOOD PRESSURE: 78 MMHG

## 2022-06-15 DIAGNOSIS — R07.81 RIB PAIN ON RIGHT SIDE: Primary | ICD-10-CM

## 2022-06-15 DIAGNOSIS — R07.81 RIB PAIN ON RIGHT SIDE: ICD-10-CM

## 2022-06-15 PROCEDURE — 99999 PR PBB SHADOW E&M-EST. PATIENT-LVL IV: ICD-10-PCS | Mod: PBBFAC,,, | Performed by: PHYSICIAN ASSISTANT

## 2022-06-15 PROCEDURE — 4010F PR ACE/ARB THEARPY RXD/TAKEN: ICD-10-PCS | Mod: CPTII,S$GLB,, | Performed by: PHYSICIAN ASSISTANT

## 2022-06-15 PROCEDURE — 1160F RVW MEDS BY RX/DR IN RCRD: CPT | Mod: CPTII,S$GLB,, | Performed by: PHYSICIAN ASSISTANT

## 2022-06-15 PROCEDURE — 3078F DIAST BP <80 MM HG: CPT | Mod: CPTII,S$GLB,, | Performed by: PHYSICIAN ASSISTANT

## 2022-06-15 PROCEDURE — 71100 XR RIBS 2 VIEW RIGHT: ICD-10-PCS | Mod: 26,RT,, | Performed by: RADIOLOGY

## 2022-06-15 PROCEDURE — 1160F PR REVIEW ALL MEDS BY PRESCRIBER/CLIN PHARMACIST DOCUMENTED: ICD-10-PCS | Mod: CPTII,S$GLB,, | Performed by: PHYSICIAN ASSISTANT

## 2022-06-15 PROCEDURE — 99999 PR PBB SHADOW E&M-EST. PATIENT-LVL IV: CPT | Mod: PBBFAC,,, | Performed by: PHYSICIAN ASSISTANT

## 2022-06-15 PROCEDURE — 3078F PR MOST RECENT DIASTOLIC BLOOD PRESSURE < 80 MM HG: ICD-10-PCS | Mod: CPTII,S$GLB,, | Performed by: PHYSICIAN ASSISTANT

## 2022-06-15 PROCEDURE — 99214 OFFICE O/P EST MOD 30 MIN: CPT | Mod: S$GLB,,, | Performed by: PHYSICIAN ASSISTANT

## 2022-06-15 PROCEDURE — 3008F PR BODY MASS INDEX (BMI) DOCUMENTED: ICD-10-PCS | Mod: CPTII,S$GLB,, | Performed by: PHYSICIAN ASSISTANT

## 2022-06-15 PROCEDURE — 3074F PR MOST RECENT SYSTOLIC BLOOD PRESSURE < 130 MM HG: ICD-10-PCS | Mod: CPTII,S$GLB,, | Performed by: PHYSICIAN ASSISTANT

## 2022-06-15 PROCEDURE — 71100 X-RAY EXAM RIBS UNI 2 VIEWS: CPT | Mod: 26,RT,, | Performed by: RADIOLOGY

## 2022-06-15 PROCEDURE — 3074F SYST BP LT 130 MM HG: CPT | Mod: CPTII,S$GLB,, | Performed by: PHYSICIAN ASSISTANT

## 2022-06-15 PROCEDURE — 71100 X-RAY EXAM RIBS UNI 2 VIEWS: CPT | Mod: TC,PO,RT

## 2022-06-15 PROCEDURE — 99214 PR OFFICE/OUTPT VISIT, EST, LEVL IV, 30-39 MIN: ICD-10-PCS | Mod: S$GLB,,, | Performed by: PHYSICIAN ASSISTANT

## 2022-06-15 PROCEDURE — 3008F BODY MASS INDEX DOCD: CPT | Mod: CPTII,S$GLB,, | Performed by: PHYSICIAN ASSISTANT

## 2022-06-15 PROCEDURE — 4010F ACE/ARB THERAPY RXD/TAKEN: CPT | Mod: CPTII,S$GLB,, | Performed by: PHYSICIAN ASSISTANT

## 2022-06-15 PROCEDURE — 1159F PR MEDICATION LIST DOCUMENTED IN MEDICAL RECORD: ICD-10-PCS | Mod: CPTII,S$GLB,, | Performed by: PHYSICIAN ASSISTANT

## 2022-06-15 PROCEDURE — 1159F MED LIST DOCD IN RCRD: CPT | Mod: CPTII,S$GLB,, | Performed by: PHYSICIAN ASSISTANT

## 2022-06-15 RX ORDER — TRAMADOL HYDROCHLORIDE 50 MG/1
50 TABLET ORAL EVERY 6 HOURS
Qty: 20 TABLET | Refills: 0 | Status: SHIPPED | OUTPATIENT
Start: 2022-06-15 | End: 2022-06-20

## 2022-06-15 NOTE — PROGRESS NOTES
Assessment/Plan:    Problem List Items Addressed This Visit        Pulmonary    Rib pain on right side - Primary    Overview     -will obtain XR ribs  -short course of Tramadol prn for pain. The patient was checked in the [facility] Board [facility] Pharmacy's  Prescription Monitoring Program. There appears to be no incongruities with the patient's verbalized history.   -follow up if no improvement or worsening of symptoms           Relevant Medications    traMADoL (ULTRAM) 50 mg tablet    Other Relevant Orders    X-Ray Ribs 2 View Right          Follow up if symptoms worsen or fail to improve.    Jodie Marvin PA-C  _____________________________________________________________________________________________________________________________________________________    CC: right sided rib pain    HPI: Patient is in clinic today as an established patient here for right sided rib pain. Symptom onset was 6 days ago. Patient stated that he was having low back pain and he asked his wife to step on his back in which he heard a cracking noise in ribs. He reports significant right sided rib pain ever since that time. Pain is currently at an 8/10. He stated that the pain is worse with walking, coughing, and breathing and has been so severe that he has been unable to sleep. He has been taking OTC tylenol with minimal relief. No other complaints today.     Past Medical History:   Diagnosis Date    Anxiety     Asthma, acute     Hyperlipidemia 7/31/2017    Joint pain     TIA (transient ischemic attack) 7/31/2017    TMJ (dislocation of temporomandibular joint)      Past Surgical History:   Procedure Laterality Date    KNEE SURGERY      left side     Review of patient's allergies indicates:   Allergen Reactions    Codeine Nausea And Vomiting     Other reaction(s): Nausea    Lexapro [escitalopram oxalate]      Erectile dysfunction.     Social History     Tobacco Use    Smoking status: Current Every Day Smoker     Packs/day:  2.00     Years: 41.00     Pack years: 82.00     Types: Cigarettes     Start date: 1970    Smokeless tobacco: Never Used   Substance Use Topics    Alcohol use: Yes     Alcohol/week: 2.0 standard drinks     Types: 2 Cans of beer per week     Comment: He is a reformed alcoholic/ 2 beers per week    Drug use: Yes     Comment: He used to use marijuana.     Family History   Problem Relation Age of Onset    Stroke Mother     Hypertension Mother     Stroke Father     Hypertension Father     Stroke Unknown         grandmother/grandfather     Current Outpatient Medications on File Prior to Visit   Medication Sig Dispense Refill    albuterol (VENTOLIN HFA) 90 mcg/actuation inhaler Inhale 2 puffs into the lungs every 6 (six) hours as needed. Rescue 54 g 11    albuterol-ipratropium (DUO-NEB) 2.5 mg-0.5 mg/3 mL nebulizer solution INHALE 1 VIAL VIA NEBULIZER EVERY SIX HOURS IF NEEDED FOR WHEEZING 360 mL 11    aspirin (ECOTRIN) 81 MG EC tablet Take 1 tablet (81 mg total) by mouth once daily. 100 tablet 3    atorvastatin (LIPITOR) 20 MG tablet Take 1 tablet (20 mg total) by mouth once daily. 30 tablet 11    losartan (COZAAR) 25 MG tablet Take 1 tablet (25 mg total) by mouth once daily. 90 tablet 3    umeclidinium-vilanteroL (ANORO ELLIPTA) 62.5-25 mcg/actuation DsDv Inhale 1 puff into the lungs once daily. Controller 30 each 11    chlorzoxazone (PARAFON FORTE) 500 mg Tab Take 500 mg by mouth 3 (three) times daily.  1     No current facility-administered medications on file prior to visit.       Review of Systems   Constitutional: Negative for chills, diaphoresis, fatigue and fever.   HENT: Negative for congestion, ear pain, postnasal drip, sinus pain and sore throat.    Eyes: Negative for pain and redness.   Respiratory: Negative for cough, chest tightness and shortness of breath.    Cardiovascular: Negative for chest pain and leg swelling.   Gastrointestinal: Negative for abdominal pain, constipation, diarrhea,  "nausea and vomiting.   Genitourinary: Negative for dysuria and hematuria.   Musculoskeletal: Negative for arthralgias and joint swelling.        +rib pain   Skin: Negative for rash.   Neurological: Negative for dizziness, syncope and headaches.       Vitals:    06/15/22 1516   BP: 128/78   Pulse: 84   Temp: 98.5 °F (36.9 °C)   TempSrc: Oral   Weight: 72.9 kg (160 lb 11.2 oz)   Height: 5' 5" (1.651 m)       Wt Readings from Last 3 Encounters:   06/15/22 72.9 kg (160 lb 11.2 oz)   03/22/22 72.5 kg (159 lb 14.4 oz)   02/21/22 72.6 kg (160 lb)       Physical Exam  Constitutional:       General: He is not in acute distress.     Appearance: Normal appearance. He is well-developed.   HENT:      Head: Normocephalic and atraumatic.   Eyes:      Conjunctiva/sclera: Conjunctivae normal.   Cardiovascular:      Rate and Rhythm: Normal rate and regular rhythm.      Pulses: Normal pulses.      Heart sounds: Normal heart sounds. No murmur heard.  Pulmonary:      Effort: Pulmonary effort is normal. No respiratory distress.      Breath sounds: Normal breath sounds. No wheezing.   Chest:      Comments: +right sided rib tenderness  Abdominal:      General: Bowel sounds are normal. There is no distension.      Palpations: Abdomen is soft.      Tenderness: There is no abdominal tenderness.   Musculoskeletal:         General: Normal range of motion.      Cervical back: Normal range of motion and neck supple.   Skin:     General: Skin is warm and dry.      Findings: No rash.   Neurological:      General: No focal deficit present.      Mental Status: He is alert and oriented to person, place, and time.         Health Maintenance   Topic Date Due    PROSTATE-SPECIFIC ANTIGEN  12/29/2022    Lipid Panel  12/29/2022    LDCT Lung Screen  01/05/2023    High Dose Statin  06/15/2023    Aspirin/Antiplatelet Therapy  06/15/2023    TETANUS VACCINE  08/31/2026    Hepatitis C Screening  Completed       "

## 2022-06-16 NOTE — PROGRESS NOTES
Results have been released via [employer]. Please verify that these have been viewed by patient. If not, please call patient with results.     I have sent a message to them with the following interpretation (see below).    I have reviewed your recent right rib xray which did not show any fractures. Please continue the medication as needed for pain and let me know if your symptoms worsen or do not improve.     Please do not hesitate to call or message with any additional questions or concerns.    Jodie Marvin PA-C

## 2022-07-08 ENCOUNTER — PATIENT MESSAGE (OUTPATIENT)
Dept: FAMILY MEDICINE | Facility: CLINIC | Age: 57
End: 2022-07-08
Payer: COMMERCIAL

## 2022-09-27 ENCOUNTER — OFFICE VISIT (OUTPATIENT)
Dept: CARDIOLOGY | Facility: CLINIC | Age: 57
End: 2022-09-27
Payer: COMMERCIAL

## 2022-09-27 VITALS
BODY MASS INDEX: 26.6 KG/M2 | WEIGHT: 159.69 LBS | SYSTOLIC BLOOD PRESSURE: 118 MMHG | HEART RATE: 78 BPM | DIASTOLIC BLOOD PRESSURE: 68 MMHG | OXYGEN SATURATION: 98 % | HEIGHT: 65 IN

## 2022-09-27 DIAGNOSIS — R53.83 OTHER FATIGUE: ICD-10-CM

## 2022-09-27 DIAGNOSIS — F17.210 NICOTINE DEPENDENCE, CIGARETTES, UNCOMPLICATED: ICD-10-CM

## 2022-09-27 DIAGNOSIS — E78.00 PURE HYPERCHOLESTEROLEMIA: ICD-10-CM

## 2022-09-27 DIAGNOSIS — I20.89 ANGINAL EQUIVALENT: ICD-10-CM

## 2022-09-27 DIAGNOSIS — I10 PRIMARY HYPERTENSION: Primary | ICD-10-CM

## 2022-09-27 PROCEDURE — 3078F PR MOST RECENT DIASTOLIC BLOOD PRESSURE < 80 MM HG: ICD-10-PCS | Mod: CPTII,S$GLB,, | Performed by: INTERNAL MEDICINE

## 2022-09-27 PROCEDURE — 4010F PR ACE/ARB THEARPY RXD/TAKEN: ICD-10-PCS | Mod: CPTII,S$GLB,, | Performed by: INTERNAL MEDICINE

## 2022-09-27 PROCEDURE — 99999 PR PBB SHADOW E&M-EST. PATIENT-LVL IV: ICD-10-PCS | Mod: PBBFAC,,, | Performed by: INTERNAL MEDICINE

## 2022-09-27 PROCEDURE — 4010F ACE/ARB THERAPY RXD/TAKEN: CPT | Mod: CPTII,S$GLB,, | Performed by: INTERNAL MEDICINE

## 2022-09-27 PROCEDURE — 3078F DIAST BP <80 MM HG: CPT | Mod: CPTII,S$GLB,, | Performed by: INTERNAL MEDICINE

## 2022-09-27 PROCEDURE — 3074F PR MOST RECENT SYSTOLIC BLOOD PRESSURE < 130 MM HG: ICD-10-PCS | Mod: CPTII,S$GLB,, | Performed by: INTERNAL MEDICINE

## 2022-09-27 PROCEDURE — 3008F PR BODY MASS INDEX (BMI) DOCUMENTED: ICD-10-PCS | Mod: CPTII,S$GLB,, | Performed by: INTERNAL MEDICINE

## 2022-09-27 PROCEDURE — 1159F MED LIST DOCD IN RCRD: CPT | Mod: CPTII,S$GLB,, | Performed by: INTERNAL MEDICINE

## 2022-09-27 PROCEDURE — 3008F BODY MASS INDEX DOCD: CPT | Mod: CPTII,S$GLB,, | Performed by: INTERNAL MEDICINE

## 2022-09-27 PROCEDURE — 3074F SYST BP LT 130 MM HG: CPT | Mod: CPTII,S$GLB,, | Performed by: INTERNAL MEDICINE

## 2022-09-27 PROCEDURE — 99999 PR PBB SHADOW E&M-EST. PATIENT-LVL IV: CPT | Mod: PBBFAC,,, | Performed by: INTERNAL MEDICINE

## 2022-09-27 PROCEDURE — 1160F PR REVIEW ALL MEDS BY PRESCRIBER/CLIN PHARMACIST DOCUMENTED: ICD-10-PCS | Mod: CPTII,S$GLB,, | Performed by: INTERNAL MEDICINE

## 2022-09-27 PROCEDURE — 1160F RVW MEDS BY RX/DR IN RCRD: CPT | Mod: CPTII,S$GLB,, | Performed by: INTERNAL MEDICINE

## 2022-09-27 PROCEDURE — 1159F PR MEDICATION LIST DOCUMENTED IN MEDICAL RECORD: ICD-10-PCS | Mod: CPTII,S$GLB,, | Performed by: INTERNAL MEDICINE

## 2022-09-27 PROCEDURE — 99214 OFFICE O/P EST MOD 30 MIN: CPT | Mod: S$GLB,,, | Performed by: INTERNAL MEDICINE

## 2022-09-27 PROCEDURE — 99214 PR OFFICE/OUTPT VISIT, EST, LEVL IV, 30-39 MIN: ICD-10-PCS | Mod: S$GLB,,, | Performed by: INTERNAL MEDICINE

## 2022-09-27 NOTE — PROGRESS NOTES
Subjective:   Patient ID:  Hector Rao is a 57 y.o. male who presents for follow-up of Follow-up  Patient denies CP, angina or anginal equivalent.   Pt states fatigue.   Hypertension  This is a chronic problem. The current episode started more than 1 year ago. The problem has been gradually improving since onset. The problem is controlled. Pertinent negatives include no chest pain, palpitations or shortness of breath. Past treatments include angiotensin blockers. The current treatment provides moderate improvement. There are no compliance problems.    Hyperlipidemia  This is a chronic problem. The current episode started more than 1 year ago. The problem is controlled. Pertinent negatives include no chest pain or shortness of breath. Current antihyperlipidemic treatment includes statins. The current treatment provides moderate improvement of lipids. There are no compliance problems.  Risk factors for coronary artery disease include hypertension, dyslipidemia and male sex.   Shortness of Breath  This is a chronic problem. The current episode started more than 1 year ago. The problem occurs intermittently. The problem has been gradually worsening. The average episode lasts 5 minutes. Pertinent negatives include no chest pain or leg swelling. The symptoms are aggravated by any activity. He has tried rest and beta agonist inhalers for the symptoms. The treatment provided mild relief.     Review of Systems   Constitutional: Negative. Negative for weight gain.   HENT: Negative.     Eyes: Negative.    Cardiovascular: Negative.  Negative for chest pain, leg swelling and palpitations.   Respiratory: Negative.  Negative for shortness of breath.    Endocrine: Negative.    Hematologic/Lymphatic: Negative.    Skin: Negative.    Musculoskeletal:  Negative for muscle weakness.   Gastrointestinal: Negative.    Genitourinary: Negative.    Neurological: Negative.  Negative for dizziness.   Psychiatric/Behavioral: Negative.      Allergic/Immunologic: Negative.    All other systems reviewed and are negative.  Family History   Problem Relation Age of Onset    Stroke Mother     Hypertension Mother     Stroke Father     Hypertension Father     Stroke Unknown         grandmother/grandfather     Past Medical History:   Diagnosis Date    Anxiety     Asthma, acute     Hyperlipidemia 7/31/2017    Joint pain     TIA (transient ischemic attack) 7/31/2017    TMJ (dislocation of temporomandibular joint)      Social History     Socioeconomic History    Marital status:      Spouse name: Myrtle    Number of children: 2   Occupational History    Occupation: Electronic Tech     Employer: [employer]     Employer:  [employer]   Tobacco Use    Smoking status: Every Day     Packs/day: 2.00     Years: 41.00     Pack years: 82.00     Types: Cigarettes     Start date: 1970    Smokeless tobacco: Never   Substance and Sexual Activity    Alcohol use: Yes     Alcohol/week: 2.0 standard drinks     Types: 2 Cans of beer per week     Comment: He is a reformed alcoholic/ 2 beers per week    Drug use: Yes     Comment: He used to use marijuana.    Sexual activity: Yes     Partners: Female     Current Outpatient Medications on File Prior to Visit   Medication Sig Dispense Refill    albuterol (VENTOLIN HFA) 90 mcg/actuation inhaler Inhale 2 puffs into the lungs every 6 (six) hours as needed. Rescue 54 g 11    albuterol-ipratropium (DUO-NEB) 2.5 mg-0.5 mg/3 mL nebulizer solution INHALE 1 VIAL VIA NEBULIZER EVERY SIX HOURS IF NEEDED FOR WHEEZING 360 mL 11    aspirin (ECOTRIN) 81 MG EC tablet Take 1 tablet (81 mg total) by mouth once daily. 100 tablet 3    atorvastatin (LIPITOR) 20 MG tablet Take 1 tablet (20 mg total) by mouth once daily. 30 tablet 11    losartan (COZAAR) 25 MG tablet Take 1 tablet (25 mg total) by mouth once daily. 90 tablet 3    umeclidinium-vilanteroL (ANORO ELLIPTA) 62.5-25 mcg/actuation DsDv Inhale 1 puff  into the lungs once daily. Controller 30 each 11    chlorzoxazone (PARAFON FORTE) 500 mg Tab Take 500 mg by mouth 3 (three) times daily.  1     No current facility-administered medications on file prior to visit.     Review of patient's allergies indicates:   Allergen Reactions    Codeine Nausea And Vomiting     Other reaction(s): Nausea    Lexapro [escitalopram oxalate]      Erectile dysfunction.       Objective:     Physical Exam  Vitals and nursing note reviewed.   Constitutional:       Appearance: He is well-developed.   HENT:      Head: Normocephalic and atraumatic.   Eyes:      Conjunctiva/sclera: Conjunctivae normal.      Pupils: Pupils are equal, round, and reactive to light.   Cardiovascular:      Rate and Rhythm: Normal rate and regular rhythm.      Pulses: Intact distal pulses.      Heart sounds: Normal heart sounds.   Pulmonary:      Effort: Pulmonary effort is normal.      Breath sounds: Normal breath sounds.   Abdominal:      General: Bowel sounds are normal.      Palpations: Abdomen is soft.   Musculoskeletal:      Cervical back: Normal range of motion and neck supple.   Skin:     General: Skin is warm and dry.   Neurological:      Mental Status: He is alert and oriented to person, place, and time.       Assessment:     1. Primary hypertension    2. Pure hypercholesterolemia    3. Anginal equivalent    4. Nicotine dependence, cigarettes, uncomplicated    5. Other fatigue        Plan:     Primary hypertension    Pure hypercholesterolemia    Anginal equivalent    Nicotine dependence, cigarettes, uncomplicated    Other fatigue      Continue losartan-htn  Continue statin-hlp  Asa- Hx of tia

## 2022-10-03 ENCOUNTER — LAB VISIT (OUTPATIENT)
Dept: LAB | Facility: HOSPITAL | Age: 57
End: 2022-10-03
Attending: INTERNAL MEDICINE
Payer: COMMERCIAL

## 2022-10-03 DIAGNOSIS — E78.00 PURE HYPERCHOLESTEROLEMIA: ICD-10-CM

## 2022-10-03 LAB
ALBUMIN SERPL BCP-MCNC: 4 G/DL (ref 3.5–5.2)
ALP SERPL-CCNC: 66 U/L (ref 55–135)
ALT SERPL W/O P-5'-P-CCNC: 22 U/L (ref 10–44)
AST SERPL-CCNC: 16 U/L (ref 10–40)
BILIRUB DIRECT SERPL-MCNC: 0.2 MG/DL (ref 0.1–0.3)
BILIRUB SERPL-MCNC: 0.5 MG/DL (ref 0.1–1)
CHOLEST SERPL-MCNC: 119 MG/DL (ref 120–199)
CHOLEST/HDLC SERPL: 2.5 {RATIO} (ref 2–5)
HDLC SERPL-MCNC: 48 MG/DL (ref 40–75)
HDLC SERPL: 40.3 % (ref 20–50)
LDLC SERPL CALC-MCNC: 56.4 MG/DL (ref 63–159)
NONHDLC SERPL-MCNC: 71 MG/DL
PROT SERPL-MCNC: 6.6 G/DL (ref 6–8.4)
TRIGL SERPL-MCNC: 73 MG/DL (ref 30–150)

## 2022-10-03 PROCEDURE — 80076 HEPATIC FUNCTION PANEL: CPT | Performed by: INTERNAL MEDICINE

## 2022-10-03 PROCEDURE — 36415 COLL VENOUS BLD VENIPUNCTURE: CPT | Mod: PO | Performed by: INTERNAL MEDICINE

## 2022-10-03 PROCEDURE — 80061 LIPID PANEL: CPT | Performed by: INTERNAL MEDICINE

## 2022-11-16 NOTE — TELEPHONE ENCOUNTER
I refilled the requested medication x 1 month.    The patient is due for an visit in the office.  Call the patient on the phone and book the patient with Juanjose Huynh NP for a visit.   ASK HIM IF HE WILL ALLOW ME TO ORDER AND SCHEDULE A COLONOSCOPY ON HIM.     PLEASE DOCUMENT THE FACT THAT YOU HAVE CONTACTED THE PATIENT IN THE CHART FOR FUTURE REFERENCE.    Health Maintenance Due   Topic Date Due    HIV Screening  07/05/1978    Shingles Vaccine (1 of 2) 07/05/2013    Colorectal Cancer Screening  03/17/2020    LDCT Lung Screen  04/18/2020    Lipid Panel  06/13/2020      Problem: Nutrition/Hydration-ADULT  Goal: Nutrient/Hydration intake appropriate for improving, restoring or maintaining nutritional needs  Description: Monitor and assess patient's nutrition/hydration status for malnutrition  Collaborate with interdisciplinary team and initiate plan and interventions as ordered  Monitor patient's weight and dietary intake as ordered or per policy  Utilize nutrition screening tool and intervene as necessary  Determine patient's food preferences and provide high-protein, high-caloric foods as appropriate       INTERVENTIONS:  - Monitor oral intake, urinary output, labs, and treatment plans  - Assess nutrition and hydration status and recommend course of action  - Evaluate amount of meals eaten  - Assist patient with eating if necessary   - Allow adequate time for meals  - Recommend/ encourage appropriate diets, oral nutritional supplements, and vitamin/mineral supplements  - Order, calculate, and assess calorie counts as needed  - Recommend, monitor, and adjust tube feedings and TPN/PPN based on assessed needs  - Assess need for intravenous fluids  - Provide specific nutrition/hydration education as appropriate  - Include patient/family/caregiver in decisions related to nutrition  Outcome: Progressing

## 2022-12-23 ENCOUNTER — PATIENT MESSAGE (OUTPATIENT)
Dept: FAMILY MEDICINE | Facility: CLINIC | Age: 57
End: 2022-12-23
Payer: COMMERCIAL

## 2023-01-04 DIAGNOSIS — I10 HTN (HYPERTENSION): ICD-10-CM

## 2023-01-18 ENCOUNTER — LAB VISIT (OUTPATIENT)
Dept: LAB | Facility: HOSPITAL | Age: 58
End: 2023-01-18
Attending: FAMILY MEDICINE
Payer: COMMERCIAL

## 2023-01-18 DIAGNOSIS — I10 HTN (HYPERTENSION): ICD-10-CM

## 2023-01-18 LAB
ALBUMIN SERPL BCP-MCNC: 4.1 G/DL (ref 3.5–5.2)
ALP SERPL-CCNC: 56 U/L (ref 55–135)
ALT SERPL W/O P-5'-P-CCNC: 29 U/L (ref 10–44)
ANION GAP SERPL CALC-SCNC: 7 MMOL/L (ref 8–16)
AST SERPL-CCNC: 20 U/L (ref 10–40)
BILIRUB SERPL-MCNC: 0.5 MG/DL (ref 0.1–1)
BUN SERPL-MCNC: 17 MG/DL (ref 6–20)
CALCIUM SERPL-MCNC: 9.7 MG/DL (ref 8.7–10.5)
CHLORIDE SERPL-SCNC: 107 MMOL/L (ref 95–110)
CO2 SERPL-SCNC: 24 MMOL/L (ref 23–29)
CREAT SERPL-MCNC: 1 MG/DL (ref 0.5–1.4)
EST. GFR  (NO RACE VARIABLE): >60 ML/MIN/1.73 M^2
GLUCOSE SERPL-MCNC: 96 MG/DL (ref 70–110)
POTASSIUM SERPL-SCNC: 4.1 MMOL/L (ref 3.5–5.1)
PROT SERPL-MCNC: 7.1 G/DL (ref 6–8.4)
SODIUM SERPL-SCNC: 138 MMOL/L (ref 136–145)

## 2023-01-18 PROCEDURE — 36415 COLL VENOUS BLD VENIPUNCTURE: CPT | Mod: PO | Performed by: FAMILY MEDICINE

## 2023-01-18 PROCEDURE — 80053 COMPREHEN METABOLIC PANEL: CPT | Performed by: FAMILY MEDICINE

## 2023-03-27 ENCOUNTER — TELEPHONE (OUTPATIENT)
Dept: OTOLARYNGOLOGY | Facility: CLINIC | Age: 58
End: 2023-03-27
Payer: COMMERCIAL

## 2023-03-27 NOTE — TELEPHONE ENCOUNTER
S/w pt, scheduled him to be seen on 04/11/2023 at 0930 am for his hearing test and 1030 am to see Dr. Geller. Pt accepted appt.

## 2023-03-28 ENCOUNTER — OFFICE VISIT (OUTPATIENT)
Dept: CARDIOLOGY | Facility: CLINIC | Age: 58
End: 2023-03-28
Payer: COMMERCIAL

## 2023-03-28 VITALS
SYSTOLIC BLOOD PRESSURE: 118 MMHG | HEIGHT: 65 IN | DIASTOLIC BLOOD PRESSURE: 66 MMHG | OXYGEN SATURATION: 98 % | BODY MASS INDEX: 25.69 KG/M2 | WEIGHT: 154.19 LBS | HEART RATE: 98 BPM

## 2023-03-28 DIAGNOSIS — E78.00 PURE HYPERCHOLESTEROLEMIA: ICD-10-CM

## 2023-03-28 DIAGNOSIS — F17.210 NICOTINE DEPENDENCE, CIGARETTES, UNCOMPLICATED: ICD-10-CM

## 2023-03-28 DIAGNOSIS — G45.9 TIA (TRANSIENT ISCHEMIC ATTACK): Primary | ICD-10-CM

## 2023-03-28 DIAGNOSIS — R06.02 SOB (SHORTNESS OF BREATH): ICD-10-CM

## 2023-03-28 DIAGNOSIS — I10 PRIMARY HYPERTENSION: ICD-10-CM

## 2023-03-28 PROCEDURE — 4010F ACE/ARB THERAPY RXD/TAKEN: CPT | Mod: CPTII,S$GLB,, | Performed by: INTERNAL MEDICINE

## 2023-03-28 PROCEDURE — 3078F DIAST BP <80 MM HG: CPT | Mod: CPTII,S$GLB,, | Performed by: INTERNAL MEDICINE

## 2023-03-28 PROCEDURE — 99999 PR PBB SHADOW E&M-EST. PATIENT-LVL IV: ICD-10-PCS | Mod: PBBFAC,,, | Performed by: INTERNAL MEDICINE

## 2023-03-28 PROCEDURE — 99999 PR PBB SHADOW E&M-EST. PATIENT-LVL IV: CPT | Mod: PBBFAC,,, | Performed by: INTERNAL MEDICINE

## 2023-03-28 PROCEDURE — 99214 PR OFFICE/OUTPT VISIT, EST, LEVL IV, 30-39 MIN: ICD-10-PCS | Mod: S$GLB,,, | Performed by: INTERNAL MEDICINE

## 2023-03-28 PROCEDURE — 1159F MED LIST DOCD IN RCRD: CPT | Mod: CPTII,S$GLB,, | Performed by: INTERNAL MEDICINE

## 2023-03-28 PROCEDURE — 1160F RVW MEDS BY RX/DR IN RCRD: CPT | Mod: CPTII,S$GLB,, | Performed by: INTERNAL MEDICINE

## 2023-03-28 PROCEDURE — 3008F BODY MASS INDEX DOCD: CPT | Mod: CPTII,S$GLB,, | Performed by: INTERNAL MEDICINE

## 2023-03-28 PROCEDURE — 4010F PR ACE/ARB THEARPY RXD/TAKEN: ICD-10-PCS | Mod: CPTII,S$GLB,, | Performed by: INTERNAL MEDICINE

## 2023-03-28 PROCEDURE — 3078F PR MOST RECENT DIASTOLIC BLOOD PRESSURE < 80 MM HG: ICD-10-PCS | Mod: CPTII,S$GLB,, | Performed by: INTERNAL MEDICINE

## 2023-03-28 PROCEDURE — 3074F PR MOST RECENT SYSTOLIC BLOOD PRESSURE < 130 MM HG: ICD-10-PCS | Mod: CPTII,S$GLB,, | Performed by: INTERNAL MEDICINE

## 2023-03-28 PROCEDURE — 1159F PR MEDICATION LIST DOCUMENTED IN MEDICAL RECORD: ICD-10-PCS | Mod: CPTII,S$GLB,, | Performed by: INTERNAL MEDICINE

## 2023-03-28 PROCEDURE — 1160F PR REVIEW ALL MEDS BY PRESCRIBER/CLIN PHARMACIST DOCUMENTED: ICD-10-PCS | Mod: CPTII,S$GLB,, | Performed by: INTERNAL MEDICINE

## 2023-03-28 PROCEDURE — 3008F PR BODY MASS INDEX (BMI) DOCUMENTED: ICD-10-PCS | Mod: CPTII,S$GLB,, | Performed by: INTERNAL MEDICINE

## 2023-03-28 PROCEDURE — 3074F SYST BP LT 130 MM HG: CPT | Mod: CPTII,S$GLB,, | Performed by: INTERNAL MEDICINE

## 2023-03-28 PROCEDURE — 99214 OFFICE O/P EST MOD 30 MIN: CPT | Mod: S$GLB,,, | Performed by: INTERNAL MEDICINE

## 2023-03-28 NOTE — PROGRESS NOTES
Subjective:   Patient ID:  Hector Rao is a 57 y.o. male who presents for follow-up of No chief complaint on file.  Patient denies CP, angina or anginal equivalent. NMT/stress and echo normal    Hypertension  This is a chronic problem. The current episode started more than 1 year ago. The problem has been gradually improving since onset. The problem is controlled. Pertinent negatives include no chest pain, palpitations or shortness of breath. Past treatments include angiotensin blockers. The current treatment provides moderate improvement. There are no compliance problems.    Hyperlipidemia  This is a chronic problem. The current episode started more than 1 year ago. The problem is controlled. Pertinent negatives include no chest pain or shortness of breath. Current antihyperlipidemic treatment includes statins. The current treatment provides moderate improvement of lipids. There are no compliance problems.  Risk factors for coronary artery disease include hypertension, dyslipidemia and male sex.   Shortness of Breath  This is a chronic problem. The current episode started more than 1 year ago. The problem occurs intermittently. The problem has been gradually worsening. The average episode lasts 5 minutes. Pertinent negatives include no chest pain or leg swelling. The symptoms are aggravated by any activity. He has tried rest and beta agonist inhalers for the symptoms. The treatment provided mild relief.     Review of Systems   Constitutional: Negative. Negative for weight gain.   HENT: Negative.     Eyes: Negative.    Cardiovascular: Negative.  Negative for chest pain, leg swelling and palpitations.   Respiratory: Negative.  Negative for shortness of breath.    Endocrine: Negative.    Hematologic/Lymphatic: Negative.    Skin: Negative.    Musculoskeletal:  Negative for muscle weakness.   Gastrointestinal: Negative.    Genitourinary: Negative.    Neurological: Negative.  Negative for dizziness.    Psychiatric/Behavioral: Negative.     Allergic/Immunologic: Negative.    All other systems reviewed and are negative.  Family History   Problem Relation Age of Onset    Stroke Mother     Hypertension Mother     Stroke Father     Hypertension Father     Stroke Unknown         grandmother/grandfather     Past Medical History:   Diagnosis Date    Anxiety     Asthma, acute     Hyperlipidemia 7/31/2017    Joint pain     TIA (transient ischemic attack) 7/31/2017    TMJ (dislocation of temporomandibular joint)      Social History     Socioeconomic History    Marital status:      Spouse name: Myrtle    Number of children: 2   Occupational History    Occupation: Electronic Tech     Employer: [employer]     Employer:  [employer]   Tobacco Use    Smoking status: Every Day     Packs/day: 2.00     Years: 41.00     Pack years: 82.00     Types: Cigarettes     Start date: 1970    Smokeless tobacco: Never   Substance and Sexual Activity    Alcohol use: Yes     Alcohol/week: 2.0 standard drinks     Types: 2 Cans of beer per week     Comment: He is a reformed alcoholic/ 2 beers per week    Drug use: Yes     Comment: He used to use marijuana.    Sexual activity: Yes     Partners: Female     Current Outpatient Medications on File Prior to Visit   Medication Sig Dispense Refill    albuterol (VENTOLIN HFA) 90 mcg/actuation inhaler Inhale 2 puffs into the lungs every 6 (six) hours as needed. Rescue 54 g 11    albuterol-ipratropium (DUO-NEB) 2.5 mg-0.5 mg/3 mL nebulizer solution INHALE 1 VIAL VIA NEBULIZER EVERY SIX HOURS IF NEEDED FOR WHEEZING 360 mL 11    ANORO ELLIPTA 62.5-25 mcg/actuation DsDv INHALE 1 PUFF INTO THE LUNGS ONCE DAILY. CONTROLLER 60 each 11    aspirin (ECOTRIN) 81 MG EC tablet Take 1 tablet (81 mg total) by mouth once daily. 100 tablet 3    atorvastatin (LIPITOR) 20 MG tablet TAKE 1 TABLET BY MOUTH EVERY DAY 90 tablet 0    chlorzoxazone (PARAFON FORTE) 500 mg Tab Take 500 mg by mouth 3 (three)  times daily.  1    losartan (COZAAR) 25 MG tablet TAKE 1 TABLET (25 MG TOTAL) BY MOUTH ONCE DAILY. 90 tablet 0     No current facility-administered medications on file prior to visit.     Review of patient's allergies indicates:   Allergen Reactions    Codeine Nausea And Vomiting     Other reaction(s): Nausea    Lexapro [escitalopram oxalate]      Erectile dysfunction.       Objective:     Physical Exam  Vitals and nursing note reviewed.   Constitutional:       Appearance: He is well-developed.   HENT:      Head: Normocephalic and atraumatic.   Eyes:      Conjunctiva/sclera: Conjunctivae normal.      Pupils: Pupils are equal, round, and reactive to light.   Cardiovascular:      Rate and Rhythm: Normal rate and regular rhythm.      Pulses: Intact distal pulses.      Heart sounds: Normal heart sounds.   Pulmonary:      Effort: Pulmonary effort is normal.      Breath sounds: Normal breath sounds.   Abdominal:      General: Bowel sounds are normal.      Palpations: Abdomen is soft.   Musculoskeletal:      Cervical back: Normal range of motion and neck supple.   Skin:     General: Skin is warm and dry.   Neurological:      Mental Status: He is alert and oriented to person, place, and time.       Assessment:     1. TIA (transient ischemic attack)    2. SOB (shortness of breath)    3. Primary hypertension    4. Pure hypercholesterolemia    5. Nicotine dependence, cigarettes, uncomplicated        Plan:     TIA (transient ischemic attack)    SOB (shortness of breath)    Primary hypertension    Pure hypercholesterolemia    Nicotine dependence, cigarettes, uncomplicated    Continue losartan-htn  Continue statin-hlp  Asa- Hx of tia   smoking cessation

## 2023-04-11 ENCOUNTER — CLINICAL SUPPORT (OUTPATIENT)
Dept: AUDIOLOGY | Facility: CLINIC | Age: 58
End: 2023-04-11
Payer: COMMERCIAL

## 2023-04-11 ENCOUNTER — PATIENT MESSAGE (OUTPATIENT)
Dept: OTOLARYNGOLOGY | Facility: CLINIC | Age: 58
End: 2023-04-11

## 2023-04-11 ENCOUNTER — OFFICE VISIT (OUTPATIENT)
Dept: OTOLARYNGOLOGY | Facility: CLINIC | Age: 58
End: 2023-04-11
Payer: COMMERCIAL

## 2023-04-11 VITALS — BODY MASS INDEX: 25.71 KG/M2 | WEIGHT: 154.31 LBS | HEIGHT: 65 IN

## 2023-04-11 DIAGNOSIS — H93.13 TINNITUS, BILATERAL: ICD-10-CM

## 2023-04-11 DIAGNOSIS — H93.12 TINNITUS, LEFT: Primary | ICD-10-CM

## 2023-04-11 DIAGNOSIS — H93.8X2 EAR PRESSURE, LEFT: ICD-10-CM

## 2023-04-11 DIAGNOSIS — H90.3 ASYMMETRICAL SENSORINEURAL HEARING LOSS: ICD-10-CM

## 2023-04-11 DIAGNOSIS — H90.3 ASYMMETRICAL SENSORINEURAL HEARING LOSS: Primary | ICD-10-CM

## 2023-04-11 PROCEDURE — 4010F PR ACE/ARB THEARPY RXD/TAKEN: ICD-10-PCS | Mod: CPTII,S$GLB,, | Performed by: OTOLARYNGOLOGY

## 2023-04-11 PROCEDURE — 99999 PR PBB SHADOW E&M-EST. PATIENT-LVL I: ICD-10-PCS | Mod: PBBFAC,,, | Performed by: AUDIOLOGIST-HEARING AID FITTER

## 2023-04-11 PROCEDURE — 99214 PR OFFICE/OUTPT VISIT, EST, LEVL IV, 30-39 MIN: ICD-10-PCS | Mod: S$GLB,,, | Performed by: OTOLARYNGOLOGY

## 2023-04-11 PROCEDURE — 99214 OFFICE O/P EST MOD 30 MIN: CPT | Mod: S$GLB,,, | Performed by: OTOLARYNGOLOGY

## 2023-04-11 PROCEDURE — 1159F PR MEDICATION LIST DOCUMENTED IN MEDICAL RECORD: ICD-10-PCS | Mod: CPTII,S$GLB,, | Performed by: OTOLARYNGOLOGY

## 2023-04-11 PROCEDURE — 1159F MED LIST DOCD IN RCRD: CPT | Mod: CPTII,S$GLB,, | Performed by: OTOLARYNGOLOGY

## 2023-04-11 PROCEDURE — 99999 PR PBB SHADOW E&M-EST. PATIENT-LVL III: CPT | Mod: PBBFAC,,, | Performed by: OTOLARYNGOLOGY

## 2023-04-11 PROCEDURE — 99999 PR PBB SHADOW E&M-EST. PATIENT-LVL III: ICD-10-PCS | Mod: PBBFAC,,, | Performed by: OTOLARYNGOLOGY

## 2023-04-11 PROCEDURE — 4010F ACE/ARB THERAPY RXD/TAKEN: CPT | Mod: CPTII,S$GLB,, | Performed by: OTOLARYNGOLOGY

## 2023-04-11 PROCEDURE — 99999 PR PBB SHADOW E&M-EST. PATIENT-LVL I: CPT | Mod: PBBFAC,,, | Performed by: AUDIOLOGIST-HEARING AID FITTER

## 2023-04-11 PROCEDURE — 3008F PR BODY MASS INDEX (BMI) DOCUMENTED: ICD-10-PCS | Mod: CPTII,S$GLB,, | Performed by: OTOLARYNGOLOGY

## 2023-04-11 PROCEDURE — 1160F PR REVIEW ALL MEDS BY PRESCRIBER/CLIN PHARMACIST DOCUMENTED: ICD-10-PCS | Mod: CPTII,S$GLB,, | Performed by: OTOLARYNGOLOGY

## 2023-04-11 PROCEDURE — 92557 PR COMPREHENSIVE HEARING TEST: ICD-10-PCS | Mod: S$GLB,,, | Performed by: AUDIOLOGIST-HEARING AID FITTER

## 2023-04-11 PROCEDURE — 3008F BODY MASS INDEX DOCD: CPT | Mod: CPTII,S$GLB,, | Performed by: OTOLARYNGOLOGY

## 2023-04-11 PROCEDURE — 92557 COMPREHENSIVE HEARING TEST: CPT | Mod: S$GLB,,, | Performed by: AUDIOLOGIST-HEARING AID FITTER

## 2023-04-11 PROCEDURE — 1160F RVW MEDS BY RX/DR IN RCRD: CPT | Mod: CPTII,S$GLB,, | Performed by: OTOLARYNGOLOGY

## 2023-04-11 NOTE — PROGRESS NOTES
Hector Rao was seen 04/11/2023 for an audiological evaluation. Pertinent complaints today include hearing loss AU, significant ear pressure AS and tinnitus AS. Pt confirms history of loud noise exposure and denies early onset of genetic family history of hearing loss. Otoscopy revealed minimal cerumen in the left ear. The tympanic membrane was visualized AU prior to proceeding with the hearing testing. He has been told he has Meniere's disease in the past but would like to confirm that Dx.     Results reveal a mild-to-moderately severe sensorineural hearing loss for the right ear and  moderate sloping to severe sensorineural hearing loss for the left ear.    Speech Reception Thresholds were  20 dBHL for the right ear and 65 dBHL for the left ear.    Word recognition scores were excellent for the right ear and fair for the left ear.  Some significant improvement was noted AS with both pure tones and WR when compared to previous hearing testing from 11/16/20.     Audiogram results were reviewed in detail with patient and all questions were answered. Results will be reviewed by the referring provider at the completion of this note. Recommend further medical evaluation with an ENT provider for the asymmetry and ear pressure, binaural amplification pending medical clearance, repeat hearing testing in one year due to asymmetry, tinnitus and loud noise exposure and bilateral hearing protection with either muffs or in-ear protection in loud noises.

## 2023-04-11 NOTE — PROGRESS NOTES
Subjective:       Patient ID: Hector Rao is a 57 y.o. male.    Chief Complaint: Tinnitus      Hector is here for tinnitus.   Length of symptoms: years.  Onset: Gradual  Symptoms occur Every day  He has associated hearing loss and fullness in the ear.   Therapies tried include: none  He used to get dizziness episodes and imbalance issues when this began but it has improved.  He has seen multiple ENT in the past.     Patient validated questionnaires (if applicable):      %       No flowsheet data found.  No flowsheet data found.  No flowsheet data found.         Social History     Tobacco Use   Smoking Status Every Day    Packs/day: 2.00    Years: 41.00    Pack years: 82.00    Types: Cigarettes    Start date: 1970   Smokeless Tobacco Never     Social History     Substance and Sexual Activity   Alcohol Use Yes    Alcohol/week: 2.0 standard drinks    Types: 2 Cans of beer per week    Comment: He is a reformed alcoholic/ 2 beers per week          Objective:        Constitutional:   Vital signs are normal. He appears well-developed and well-nourished.     Head:  Normocephalic and atraumatic.     Ears:  Hearing normal to normal and whispered voice; external ear normal without scars, lesions, or masses; ear canal, tympanic membrane, and middle ear normal..     Nose:  Nose normal including turbinates, nasal mucosa, sinuses and nasal septum.     Mouth/Throat  Oropharynx clear and moist without lesions or asymmetry.     Neck:  Neck normal without thyromegaly masses, asymmetry, normal tracheal structure, crepitus, and tenderness.       Tests / Results:  MRI IAC 2020:  COMPARISON:  None     FINDINGS:  No acute ischemia or infarction.  Brain volume normal.  There are a few scattered focal punctate areas of abnormal white matter signal predominantly involving the subcortical white matter consistent with mild chronic microvascular white matter change.  Ventricles, sulci, cisterns are normal with no mass effect or midline shift.   No intra or extra-axial mass or hemorrhage.  The posterior fossa and brainstem are normal.  The sella and orbits are grossly normal.  Paranasal sinuses are clear.     Internal auditory canals demonstrate normal bilaterally symmetric appearance with no abnormal enhancement to indicate acoustic neuroma.  Visualized portions of the 7th and 8th nerves are normal.  Cerebellopontine angles are clear.  No significant vascular anomalies.  Inner ear structures including the cochlea and semicircular canals are normal.  Mastoid bones demonstrate normal signal.     Impression:     No acute intracranial abnormality.  Minimal chronic microvascular white matter ischemic change.     Normal MRI of the internal auditory canals.                 Assessment:       1. Asymmetrical sensorineural hearing loss    2. Tinnitus, bilateral          Plan:         Discussed HL, tinnitus  Unlikely to benefit substantially with aid, though can try.   Discussed tinnitus and relationship to HL  Questions answered

## 2023-05-26 DIAGNOSIS — I10 ESSENTIAL HYPERTENSION: ICD-10-CM

## 2023-05-26 RX ORDER — LOSARTAN POTASSIUM 25 MG/1
25 TABLET ORAL DAILY
Qty: 90 TABLET | Refills: 0 | Status: SHIPPED | OUTPATIENT
Start: 2023-05-26 | End: 2023-08-17

## 2023-05-26 NOTE — TELEPHONE ENCOUNTER
No care due was identified.  [facility] Embedded Care Due Messages. Reference number: [id number].   5/26/2023 1:08:17 PM CDT

## 2023-08-17 DIAGNOSIS — I10 ESSENTIAL HYPERTENSION: ICD-10-CM

## 2023-08-17 RX ORDER — LOSARTAN POTASSIUM 25 MG/1
25 TABLET ORAL
Qty: 90 TABLET | Refills: 0 | Status: SHIPPED | OUTPATIENT
Start: 2023-08-17 | End: 2023-09-22 | Stop reason: SDUPTHER

## 2023-08-17 NOTE — TELEPHONE ENCOUNTER
Care Due:                  Date            Visit Type   Department     Provider  --------------------------------------------------------------------------------                                EP -                              PRIMARY      [facility] FAMILY  Last Visit: 12-      CARE ([facility])   MEDICINE       Jerome Arnold  Next Visit: None Scheduled  None         None Found                                                            Last  Test          Frequency    Reason                     Performed    Due Date  --------------------------------------------------------------------------------    Office Visit  12 months..  ANORO atorvastatin,       12- 12-                             losartan.................    Lipid Panel.  12 months..  atorvastatin.............  10-   09-    [employer] Embedded Care Due Messages. Reference number: [id number].   8/17/2023 11:59:47 AM CDT

## 2023-08-17 NOTE — TELEPHONE ENCOUNTER
I refilled the requested medication x 1 month.  The patient is due for a visit.  Call the patient on the phone and book the patient with   Juanjose Huynh NP or   Melinda Perez DNP.    PLEASE DOCUMENT THE FACT THAT YOU HAVE CONTACTED THE PATIENT IN THE CHART FOR FUTURE REFERENCE.    Health Maintenance Due   Topic Date Due    COVID-19 Vaccine (1) Never done    Hemoglobin A1c (Diabetic Prevention Screening)  Never done    Shingles Vaccine (1 of 2) Never done    Pneumococcal Vaccines (Age 0-64) (2 - PCV) 04/05/2018    Colorectal Cancer Screening  03/17/2020    PROSTATE-SPECIFIC ANTIGEN  12/29/2022    LDCT Lung Screen  01/05/2023

## 2023-09-22 ENCOUNTER — OFFICE VISIT (OUTPATIENT)
Dept: FAMILY MEDICINE | Facility: CLINIC | Age: 58
End: 2023-09-22
Payer: COMMERCIAL

## 2023-09-22 VITALS
HEIGHT: 66 IN | DIASTOLIC BLOOD PRESSURE: 70 MMHG | BODY MASS INDEX: 24.59 KG/M2 | HEART RATE: 85 BPM | SYSTOLIC BLOOD PRESSURE: 115 MMHG | WEIGHT: 153 LBS | TEMPERATURE: 98 F

## 2023-09-22 DIAGNOSIS — I10 ESSENTIAL HYPERTENSION: ICD-10-CM

## 2023-09-22 DIAGNOSIS — Z86.73 HISTORY OF TRANSIENT ISCHEMIC ATTACK (TIA): ICD-10-CM

## 2023-09-22 DIAGNOSIS — I70.0 AORTIC CALCIFICATION: ICD-10-CM

## 2023-09-22 DIAGNOSIS — E78.00 PURE HYPERCHOLESTEROLEMIA: ICD-10-CM

## 2023-09-22 DIAGNOSIS — J44.89 ASTHMA WITH COPD: Primary | Chronic | ICD-10-CM

## 2023-09-22 PROBLEM — I20.89 ANGINAL EQUIVALENT: Status: RESOLVED | Noted: 2022-01-19 | Resolved: 2023-09-22

## 2023-09-22 PROBLEM — E78.5 HYPERLIPIDEMIA: Chronic | Status: ACTIVE | Noted: 2017-07-31

## 2023-09-22 PROBLEM — G45.9 TIA (TRANSIENT ISCHEMIC ATTACK): Status: RESOLVED | Noted: 2017-07-31 | Resolved: 2023-09-22

## 2023-09-22 PROCEDURE — 99214 OFFICE O/P EST MOD 30 MIN: CPT | Mod: S$GLB,,, | Performed by: FAMILY MEDICINE

## 2023-09-22 PROCEDURE — 1159F MED LIST DOCD IN RCRD: CPT | Mod: CPTII,S$GLB,, | Performed by: FAMILY MEDICINE

## 2023-09-22 PROCEDURE — 3078F PR MOST RECENT DIASTOLIC BLOOD PRESSURE < 80 MM HG: ICD-10-PCS | Mod: CPTII,S$GLB,, | Performed by: FAMILY MEDICINE

## 2023-09-22 PROCEDURE — 3008F BODY MASS INDEX DOCD: CPT | Mod: CPTII,S$GLB,, | Performed by: FAMILY MEDICINE

## 2023-09-22 PROCEDURE — 1159F PR MEDICATION LIST DOCUMENTED IN MEDICAL RECORD: ICD-10-PCS | Mod: CPTII,S$GLB,, | Performed by: FAMILY MEDICINE

## 2023-09-22 PROCEDURE — 3078F DIAST BP <80 MM HG: CPT | Mod: CPTII,S$GLB,, | Performed by: FAMILY MEDICINE

## 2023-09-22 PROCEDURE — 3008F PR BODY MASS INDEX (BMI) DOCUMENTED: ICD-10-PCS | Mod: CPTII,S$GLB,, | Performed by: FAMILY MEDICINE

## 2023-09-22 PROCEDURE — 99214 PR OFFICE/OUTPT VISIT, EST, LEVL IV, 30-39 MIN: ICD-10-PCS | Mod: S$GLB,,, | Performed by: FAMILY MEDICINE

## 2023-09-22 PROCEDURE — 4010F PR ACE/ARB THEARPY RXD/TAKEN: ICD-10-PCS | Mod: CPTII,S$GLB,, | Performed by: FAMILY MEDICINE

## 2023-09-22 PROCEDURE — 4010F ACE/ARB THERAPY RXD/TAKEN: CPT | Mod: CPTII,S$GLB,, | Performed by: FAMILY MEDICINE

## 2023-09-22 PROCEDURE — 99999 PR PBB SHADOW E&M-EST. PATIENT-LVL IV: CPT | Mod: PBBFAC,,, | Performed by: FAMILY MEDICINE

## 2023-09-22 PROCEDURE — 3074F PR MOST RECENT SYSTOLIC BLOOD PRESSURE < 130 MM HG: ICD-10-PCS | Mod: CPTII,S$GLB,, | Performed by: FAMILY MEDICINE

## 2023-09-22 PROCEDURE — 1160F PR REVIEW ALL MEDS BY PRESCRIBER/CLIN PHARMACIST DOCUMENTED: ICD-10-PCS | Mod: CPTII,S$GLB,, | Performed by: FAMILY MEDICINE

## 2023-09-22 PROCEDURE — 99999 PR PBB SHADOW E&M-EST. PATIENT-LVL IV: ICD-10-PCS | Mod: PBBFAC,,, | Performed by: FAMILY MEDICINE

## 2023-09-22 PROCEDURE — 3074F SYST BP LT 130 MM HG: CPT | Mod: CPTII,S$GLB,, | Performed by: FAMILY MEDICINE

## 2023-09-22 PROCEDURE — 1160F RVW MEDS BY RX/DR IN RCRD: CPT | Mod: CPTII,S$GLB,, | Performed by: FAMILY MEDICINE

## 2023-09-22 RX ORDER — LOSARTAN POTASSIUM 25 MG/1
25 TABLET ORAL DAILY
Qty: 90 TABLET | Refills: 0 | Status: SHIPPED | OUTPATIENT
Start: 2023-09-22 | End: 2024-01-30

## 2023-09-22 RX ORDER — IPRATROPIUM BROMIDE AND ALBUTEROL SULFATE 2.5; .5 MG/3ML; MG/3ML
SOLUTION RESPIRATORY (INHALATION)
Qty: 360 ML | Refills: 11 | Status: SHIPPED | OUTPATIENT
Start: 2023-09-22

## 2023-09-22 RX ORDER — ATORVASTATIN CALCIUM 20 MG/1
20 TABLET, FILM COATED ORAL DAILY
Qty: 90 TABLET | Refills: 2 | Status: SHIPPED | OUTPATIENT
Start: 2023-09-22

## 2023-09-22 NOTE — ASSESSMENT & PLAN NOTE
Patient advised follow-up with his PCP to discuss if he can stop the medications.  I have refilled them and placed orders for blood work.

## 2023-09-22 NOTE — PROGRESS NOTES
PLAN:      Problem List Items Addressed This Visit       Hyperlipidemia (Chronic)     Patient is new to me.  Cholesterol is well controlled.  Patient reports intermittent compliance with medications.  Questionable TIA per patient.    Counseled on hyperlipidemia disease course, healthy diet and increased need for exercise.  Please be advised of the risk of cardiovascular disease, increase stroke and heart attack risk with uncontrolled/untreated hyperlipidemia.     Patient voiced understanding and understood the treatment plan. All questions were answered.              Asthma with COPD / Emphysema - Primary (Chronic)     Continue inhalers.  Patient is new to me.  Patient will follow-up with PCP.  Asthma/COPD precautions.  ER precautions.  Smoking cessation recommended.         Relevant Medications    albuterol-ipratropium (DUO-NEB) 2.5 mg-0.5 mg/3 mL nebulizer solution    Other Relevant Orders    CBC Without Differential    Comprehensive Metabolic Panel    TSH    Hemoglobin A1C    Lipid Panel    Essential hypertension     Patient reports he only takes losartan twice per week.  He wants to get off of the medication.  Blood pressure well controlled today.  Patient will start checking blood pressure at home and update us with a blood pressure log.  I recommend continuing losartan if blood pressure readings elevated above 140/90 on average.  Counseled on importance of hypertension disease course, I recommend ongoing Education for DASH-diet and exercise.  Counseled on medication regimen importance of treating high blood pressure.  Please be advised of risk of untreated blood pressure as discussed.  Please advised of ER precautions were given for symptoms of hypertensive urgency and emergency.           Relevant Medications    losartan (COZAAR) 25 MG tablet    Other Relevant Orders    CBC Without Differential    Comprehensive Metabolic Panel    TSH    Hemoglobin A1C    Lipid Panel    History of transient ischemic attack  (TIA)     Patient advised follow-up with PCP to discuss stopping medications if appropriate.  Patient has no neurological deficits on exam today.    Stroke precautions.  Continue aspirin.         Relevant Medications    atorvastatin (LIPITOR) 20 MG tablet    Other Relevant Orders    CBC Without Differential    Comprehensive Metabolic Panel    TSH    Hemoglobin A1C    Lipid Panel    Aortic calcification     Patient advised follow-up with his PCP to discuss if he can stop the medications.  I have refilled them and placed orders for blood work.         Relevant Orders    CBC Without Differential    Comprehensive Metabolic Panel    TSH    Hemoglobin A1C    Lipid Panel     Future Appointments       Date Provider Specialty Appt Notes    9/22/2023 Jose Moy MD Family Medicine f/u Patitent will be here at 1:00    9/25/2023 Juanjose Huynh NP Family Medicine f/u med refill    3/26/2024 Farhat Samano MD Cardiology annual           Medication Management for assessment above:   Medication List with Changes/Refills   Current Medications    ALBUTEROL (VENTOLIN HFA) 90 MCG/ACTUATION INHALER    Inhale 2 puffs into the lungs every 6 (six) hours as needed. Rescue    ANORO ELLIPTA 62.5-25 MCG/ACTUATION DSDV    INHALE 1 PUFF INTO THE LUNGS ONCE DAILY. CONTROLLER    ASPIRIN (ECOTRIN) 81 MG EC TABLET    Take 1 tablet (81 mg total) by mouth once daily.    CHLORZOXAZONE (PARAFON FORTE) 500 MG TAB    Take 500 mg by mouth 3 (three) times daily.   Changed and/or Refilled Medications    Modified Medication Previous Medication    ALBUTEROL-IPRATROPIUM (DUO-NEB) 2.5 MG-0.5 MG/3 ML NEBULIZER SOLUTION albuterol-ipratropium (DUO-NEB) 2.5 mg-0.5 mg/3 mL nebulizer solution       INHALE 1 VIAL VIA NEBULIZER EVERY SIX HOURS IF NEEDED FOR WHEEZING    INHALE 1 VIAL VIA NEBULIZER EVERY SIX HOURS IF NEEDED FOR WHEEZING    ATORVASTATIN (LIPITOR) 20 MG TABLET atorvastatin (LIPITOR) 20 MG tablet       Take 1 tablet (20 mg total) by mouth  once daily.    TAKE 1 TABLET BY MOUTH EVERY DAY    LOSARTAN (COZAAR) 25 MG TABLET losartan (COZAAR) 25 MG tablet       Take 1 tablet (25 mg total) by mouth once daily.    TAKE 1 TABLET BY MOUTH EVERY DAY       Jose Moy M.D.  ==========================================================================  Subjective:   Patient ID: Hector Rao is a 58 y.o. male.  has a past medical history of Anxiety, Asthma, acute, Hyperlipidemia (7/31/2017), Joint pain, TIA (transient ischemic attack) (7/31/2017), and TMJ (dislocation of temporomandibular joint).   Chief Complaint: Follow-up      Problem List Items Addressed This Visit       Hyperlipidemia (Chronic)    Overview     He has hyperlipidemia.    He is on lipitor for that.  Lab Results   Component Value Date    CHOL 119 (L) 10/03/2022    CHOL 192 12/29/2021    CHOL 181 11/03/2020       Lab Results   Component Value Date    HDL 48 10/03/2022    HDL 48 12/29/2021    HDL 53 11/03/2020       Lab Results   Component Value Date    LDLCALC 56.4 (L) 10/03/2022    LDLCALC 124.4 12/29/2021    LDLCALC 111.4 11/03/2020       Lab Results   Component Value Date    TRIG 73 10/03/2022    TRIG 98 12/29/2021    TRIG 83 11/03/2020       Lab Results   Component Value Date    CHOLHDL 40.3 10/03/2022    CHOLHDL 25.0 12/29/2021    CHOLHDL 29.3 11/03/2020     Lab Results   Component Value Date    ALT 29 01/18/2023    AST 20 01/18/2023    ALKPHOS 56 01/18/2023    BILITOT 0.5 01/18/2023   The ASCVD Risk score (Damaris LUTZ, et al., 2019) failed to calculate for the following reasons:    The valid total cholesterol range is 130 to 320 mg/dL           Current Assessment & Plan     Patient is new to me.  Cholesterol is well controlled.  Patient reports intermittent compliance with medications.  Questionable TIA per patient.    Counseled on hyperlipidemia disease course, healthy diet and increased need for exercise.  Please be advised of the risk of cardiovascular disease, increase stroke and  heart attack risk with uncontrolled/untreated hyperlipidemia.     Patient voiced understanding and understood the treatment plan. All questions were answered.              Asthma with COPD / Emphysema - Primary (Chronic)    Overview     September 2023: Patient is new to me.  Reviewed history from PCP.  Patient is a current smoker.  The patient presents with asthma/copd brought on and exacerbated by his smoking.  He has used anoro and it has helped him.  He has used it every 2 days. He has been self medicating with sildenafil and he requested a refill on this for his lungs.  We discussed the possibility of pulmnonary hypertension but he has not had a workup of this.  He and I discussed seeing a pulmonary doc for this and he will think about it.     Current medications used to help asthma include:   Your Quick Relief Medication: (7000h ago through 1000h from now)          Stop Sig     ANORO ELLIPTA 62.5-25 mcg/actuation DsDv  Daily        Sig: INHALE 1 PUFF INTO THE LUNGS ONCE DAILY. CONTROLLER       -- INHALE 1 PUFF INTO THE LUNGS ONCE DAILY. CONTROLLER     albuterol (VENTOLIN HFA) 90 mcg/actuation inhaler  Every 6 hours PRN        Sig: Inhale 2 puffs into the lungs every 6 (six) hours as needed. Rescue (Patient not taking: Reported on 9/22/2023)       -- Inhale 2 puffs into the lungs every 6 (six) hours as needed. Rescue        Patient not taking: Reported on 9/22/2023          Your Controller Medications: (7000h ago through 1000h from now)      None                    Current Assessment & Plan     Continue inhalers.  Patient is new to me.  Patient will follow-up with PCP.  Asthma/COPD precautions.  ER precautions.  Smoking cessation recommended.         Essential hypertension    Overview     Previous history:  The patient presents with essential hypertension.   He used to be on losartan but he is not on that now and his bp is fine today. So he is diet controlled.  CHRONIC. STABLE. BP Reviewed.  Intermittent  compliance with BP medications. No SE reported.   (-) CP, SOB, palpitations, dizziness, lightheadedness, HA, arm numbness, tingling or weakness, syncope.  Creatinine   Date Value Ref Range Status   01/18/2023 1.0 0.5 - 1.4 mg/dL Final     No results found for this or any previous visit.           Current Assessment & Plan     Patient reports he only takes losartan twice per week.  He wants to get off of the medication.  Blood pressure well controlled today.  Patient will start checking blood pressure at home and update us with a blood pressure log.  I recommend continuing losartan if blood pressure readings elevated above 140/90 on average.  Counseled on importance of hypertension disease course, I recommend ongoing Education for DASH-diet and exercise.  Counseled on medication regimen importance of treating high blood pressure.  Please be advised of risk of untreated blood pressure as discussed.  Please advised of ER precautions were given for symptoms of hypertensive urgency and emergency.           History of transient ischemic attack (TIA)    Overview     Patient is new to me.  Reports questionable history of TIA.  Patient reports that he was under a lot of stress at the time.  Patient is on treatment for blood pressure and cholesterol.  Patient takes aspirin twice weekly along with his other medications.  Narrative & Impression  EXAMINATION:  MRI BRAIN and internal auditory canals w WO CONTRAST     CLINICAL HISTORY:  tinnitus/vertigo; Tinnitus, left ear     TECHNIQUE:  Multiplanar multisequence MR imaging of the brain was performed before and after the administration of 15 mL Gadavist intravenous contrast.  MRI internal auditory canals performed.     COMPARISON:  None     FINDINGS:  No acute ischemia or infarction.  Brain volume normal.  There are a few scattered focal punctate areas of abnormal white matter signal predominantly involving the subcortical white matter consistent with mild chronic microvascular  white matter change.  Ventricles, sulci, cisterns are normal with no mass effect or midline shift.  No intra or extra-axial mass or hemorrhage.  The posterior fossa and brainstem are normal.  The sella and orbits are grossly normal.  Paranasal sinuses are clear.     Internal auditory canals demonstrate normal bilaterally symmetric appearance with no abnormal enhancement to indicate acoustic neuroma.  Visualized portions of the 7th and 8th nerves are normal.  Cerebellopontine angles are clear.  No significant vascular anomalies.  Inner ear structures including the cochlea and semicircular canals are normal.  Mastoid bones demonstrate normal signal.     Impression:     No acute intracranial abnormality.  Minimal chronic microvascular white matter ischemic change.     Normal MRI of the internal auditory canals.        Electronically signed by: Monica Russell MD  Date:                                            11/28/2020  Time:                                           08:39           Exam Ended: 11/27/20 19:35                    Current Assessment & Plan     Patient advised follow-up with PCP to discuss stopping medications if appropriate.  Patient has no neurological deficits on exam today.    Stroke precautions.  Continue aspirin.         Aortic calcification    Overview     Seen on previous imaging.  Patient is on statin and aspirin again patient is only taking the medications twice per week.  Patient is wanting to stop the medication.         Current Assessment & Plan     Patient advised follow-up with his PCP to discuss if he can stop the medications.  I have refilled them and placed orders for blood work.             Review of patient's allergies indicates:   Allergen Reactions    Codeine Nausea And Vomiting     Other reaction(s): Nausea    Lexapro [escitalopram oxalate]      Erectile dysfunction.     Current Outpatient Medications   Medication Instructions    albuterol (VENTOLIN HFA) 90 mcg/actuation inhaler  "2 puffs, Inhalation, Every 6 hours PRN, Rescue    albuterol-ipratropium (DUO-NEB) 2.5 mg-0.5 mg/3 mL nebulizer solution INHALE 1 VIAL VIA NEBULIZER EVERY SIX HOURS IF NEEDED FOR WHEEZING        ANORO ELLIPTA 62.5-25 mcg/actuation DsDv 1 puff, Inhalation, Daily, Controller    aspirin (ECOTRIN) 81 mg, Oral, Daily    atorvastatin (LIPITOR) 20 mg, Oral, Daily    chlorzoxazone (PARAFON FORTE) 500 mg, Oral, 3 times daily    losartan (COZAAR) 25 mg, Oral, Daily      I have reviewed the PMH, social history, FamilyHx, surgical history, allergies and medications documented / confirmed by the patient at the time of this visit.  Review of Systems   Constitutional:  Negative for chills, fatigue, fever and unexpected weight change.   HENT:  Negative for ear pain and sore throat.    Eyes:  Negative for redness and visual disturbance.   Respiratory:  Negative for cough and shortness of breath.    Cardiovascular:  Negative for chest pain and palpitations.   Gastrointestinal:  Negative for nausea and vomiting.   Endocrine: Negative for cold intolerance and heat intolerance.   Genitourinary:  Negative for difficulty urinating and hematuria.   Musculoskeletal:  Negative for arthralgias and myalgias.   Skin:  Negative for rash and wound.   Allergic/Immunologic: Negative for environmental allergies and food allergies.   Neurological:  Negative for weakness and headaches.   Hematological:  Negative for adenopathy. Does not bruise/bleed easily.   Psychiatric/Behavioral:  Negative for sleep disturbance. The patient is not nervous/anxious.      Objective:   /70   Pulse 85   Temp 98 °F (36.7 °C)   Ht 5' 6" (1.676 m)   Wt 69.4 kg (153 lb)   BMI 24.69 kg/m²   Physical Exam  Vitals and nursing note reviewed.   Constitutional:       General: He is not in acute distress.     Appearance: He is well-developed. He is not diaphoretic.   HENT:      Head: Normocephalic and atraumatic.      Right Ear: External ear normal.      Left Ear: " External ear normal.      Nose: Nose normal. No rhinorrhea.   Eyes:      Extraocular Movements: Extraocular movements intact.      Pupils: Pupils are equal, round, and reactive to light.   Neck:      Vascular: No carotid bruit.   Cardiovascular:      Rate and Rhythm: Normal rate.      Pulses: Normal pulses.   Pulmonary:      Effort: Pulmonary effort is normal. No respiratory distress.      Breath sounds: Normal breath sounds.   Musculoskeletal:         General: Normal range of motion.      Cervical back: Normal range of motion and neck supple.   Skin:     General: Skin is warm and dry.      Capillary Refill: Capillary refill takes less than 2 seconds.      Findings: No rash.   Neurological:      General: No focal deficit present.      Mental Status: He is alert and oriented to person, place, and time. Mental status is at baseline.      Cranial Nerves: No cranial nerve deficit.      Motor: No weakness.      Gait: Gait normal.   Psychiatric:         Attention and Perception: He is attentive.         Mood and Affect: Mood normal. Mood is not anxious or depressed. Affect is not labile, blunt, angry or inappropriate.         Speech: He is communicative. Speech is not rapid and pressured, delayed, slurred or tangential.         Behavior: Behavior normal. Behavior is not agitated, slowed, aggressive, withdrawn, hyperactive or combative.         Thought Content: Thought content normal. Thought content is not paranoid or delusional. Thought content does not include homicidal or suicidal ideation. Thought content does not include homicidal or suicidal plan.         Cognition and Memory: Memory is not impaired.         Judgment: Judgment normal. Judgment is not impulsive or inappropriate.       Assessment:     1. Asthma with COPD / Emphysema    2. Essential hypertension    3. History of transient ischemic attack (TIA)    4. Aortic calcification    5. Pure hypercholesterolemia      MDM:   Moderate medical complexity.  Moderate  risk.  Total time: 33 minutes.  This includes total time spent on the encounter, which includes face to face time and non-face to face time preparing to see the patient (eg, review of previous medical records, tests), Obtaining and/or reviewing separately obtained history, documenting clinical information in the electronic or other health record, independently interpreting results (not separately reported)/communicating results to the patient/family/caregiver, and/or care coordination (not separately reported).    I have Reviewed and summarized old records.  I have performed thorough medication reconciliation today and discussed risk and benefits of medications.  I have reviewed MRI ,labs and discussed with patient.  All questions were answered.  Patient declines all health maintenance items that are due and recommended today.  Follow-up with PCP to discuss in detail.      I have signed for the following orders AND/OR meds.  Orders Placed This Encounter   Procedures    CBC Without Differential     Standing Status:   Future     Standing Expiration Date:   11/20/2024    Comprehensive Metabolic Panel     Standing Status:   Future     Standing Expiration Date:   11/20/2024    TSH     Standing Status:   Future     Standing Expiration Date:   11/20/2024    Hemoglobin A1C     Standing Status:   Future     Standing Expiration Date:   11/20/2024    Lipid Panel     Standing Status:   Future     Standing Expiration Date:   11/20/2024     Medications Ordered This Encounter   Medications    albuterol-ipratropium (DUO-NEB) 2.5 mg-0.5 mg/3 mL nebulizer solution     Sig: INHALE 1 VIAL VIA NEBULIZER EVERY SIX HOURS IF NEEDED FOR WHEEZING     Dispense:  360 mL     Refill:  11    atorvastatin (LIPITOR) 20 MG tablet     Sig: Take 1 tablet (20 mg total) by mouth once daily.     Dispense:  90 tablet     Refill:  2    losartan (COZAAR) 25 MG tablet     Sig: Take 1 tablet (25 mg total) by mouth once daily.     Dispense:  90 tablet      Refill:  0     .        Follow up if symptoms worsen or fail to improve.  Future Appointments       Date Provider Specialty Appt Notes    9/22/2023 Jose Moy MD Family Medicine f/u Pat[facility] will be here at 1:00    9/25/2023 Juanjose Huynh NP Family Medicine f/u med refill    3/26/2024 Farhat Samano MD Cardiology annual          If no improvement in symptoms or symptoms worsen, advised to call/follow-up at clinic or go to ER. Patient voiced understanding and all questions/concerns were addressed.   DISCLAIMER: This note was compiled by using a speech recognition dictation system and therefore please be aware that typographical / speech recognition errors can and do occur.  Please contact me if you see any errors specifically.    Jose Moy M.D.       Office: [phone]   [address] [place] [address]  FAX: [phone]

## 2023-09-22 NOTE — ASSESSMENT & PLAN NOTE
Patient reports he only takes losartan twice per week.  He wants to get off of the medication.  Blood pressure well controlled today.  Patient will start checking blood pressure at home and update us with a blood pressure log.  I recommend continuing losartan if blood pressure readings elevated above 140/90 on average.  Counseled on importance of hypertension disease course, I recommend ongoing Education for DASH-diet and exercise.  Counseled on medication regimen importance of treating high blood pressure.  Please be advised of risk of untreated blood pressure as discussed.  Please advised of ER precautions were given for symptoms of hypertensive urgency and emergency.

## 2023-09-22 NOTE — ASSESSMENT & PLAN NOTE
Continue inhalers.  Patient is new to me.  Patient will follow-up with PCP.  Asthma/COPD precautions.  ER precautions.  Smoking cessation recommended.

## 2023-09-22 NOTE — ASSESSMENT & PLAN NOTE
Patient is new to me.  Cholesterol is well controlled.  Patient reports intermittent compliance with medications.  Questionable TIA per patient.    Counseled on hyperlipidemia disease course, healthy diet and increased need for exercise.  Please be advised of the risk of cardiovascular disease, increase stroke and heart attack risk with uncontrolled/untreated hyperlipidemia.     Patient voiced understanding and understood the treatment plan. All questions were answered.

## 2023-09-22 NOTE — PATIENT INSTRUCTIONS
Follow up if symptoms worsen or fail to improve.     Dear patient,   As a result of recent federal legislation (The Federal Cures Act), you may receive lab or pathology results from your visit in your [facility] account before your physician is able to contact you. Your physician or their representative will relay the results to you with their recommendations at their soonest availability.     If no improvement in symptoms or symptoms worsen, please be advised to call MD, follow-up at clinic and/or go to ER if becomes severe.    Jose Moy M.D.        We Offer TELEHEALTH & Same Day Appointments!   Book your Telehealth appointment with me through my nurse or   Clinic appointments on [employer]!    [address] [place] [address]    Office: [phone]   FAX: [phone]    Check out my Facebook Page and Follow Me at: [email or web address]    Check out my website at [employer] by clicking on: [email or web address]    To Schedule appointments online, go to [facility]: [email or web address]

## 2023-09-22 NOTE — ASSESSMENT & PLAN NOTE
Patient advised follow-up with PCP to discuss stopping medications if appropriate.  Patient has no neurological deficits on exam today.    Stroke precautions.  Continue aspirin.

## 2023-10-31 NOTE — TELEPHONE ENCOUNTER
----- Message from Veronica Broderick sent at 12/19/2018  4:52 PM CST -----  Contact: pt's wife  She's returning a missed call, please advise [phone]   Thank you for choosing [facility] as your Pain Management provider!    It is recommended you schedule a follow-up appointment with WILIAM Barboza in 2-3 months   IMAGING  You have been recommended to undergo an MRI of your lumbar to help us better understand and treat your symptoms.To schedule your imagining appointment contact [facility] at [phone]. We will call you with your results.    CONTACTING YOUR PROVIDER  Our office hours are 8:00 am to 4:30 pm Monday through Friday. For urgent medical need contact 9-1-1. For routine medical questions you can leave a message for your provider on your [employer] account. We recommend utilizing [employer] for a faster response by messaging your provider directly. To sign up for [employer] you can visit [email or web address] or stop at any of our reception desk. If you do not have access to [employer] you can contact our pain line at  [phone] for routine medical questions. All calls will be answered within 2 business days.

## 2024-01-29 DIAGNOSIS — I10 ESSENTIAL HYPERTENSION: ICD-10-CM

## 2024-01-29 NOTE — TELEPHONE ENCOUNTER
Care Due:                  Date            Visit Type   Department     Provider  --------------------------------------------------------------------------------                                ESTABLISHED   [facility] FAMILY  Last Visit: 09-      PATIENT      MEDICINE       Jose Moy  Next Visit: None Scheduled  None         None Found                                                            Last  Test          Frequency    Reason                     Performed    Due Date  --------------------------------------------------------------------------------    CMP.........  12 months..  atorvastatin, losartan...  01- 01-    Lipid Panel.  12 months..  atorvastatin.............  10-   09-    [facility] Embedded Care Due Messages. Reference number: [id number].   1/29/2024 2:14:28 PM CST

## 2024-01-30 RX ORDER — LOSARTAN POTASSIUM 25 MG/1
25 TABLET ORAL DAILY
Qty: 90 TABLET | Refills: 0 | Status: SHIPPED | OUTPATIENT
Start: 2024-01-30 | End: 2024-04-29

## 2024-01-30 NOTE — TELEPHONE ENCOUNTER
Refill Routing Note   Medication(s) are not appropriate for processing by [facility] for the following reason(s):        Patient not seen by provider within 15 months  Required labs outdated    [facility] action(s):  Defer     Requires labs : Yes             Appointments  past 12m or future 3m with PCP    Date Provider   Last Visit   12/29/2021 Jerome Arnold MD   Next Visit   Visit date not found Jerome Arnold MD   ED visits in past 90 days: 0        Note composed:3:06 AM 01/30/2024

## 2024-01-30 NOTE — TELEPHONE ENCOUNTER
The patient is overdue to see me.     The patient's last visit with me was on 12/29/2021.  Arrange and appointment and schedule labs/immunizations needed in Health Maintenance to close the care gaps.  If appropriate, a virtual visit may be used for this.  I have refilled the requested medicine x 1.  Arrange health maintenance to be completed prior to visit if the patient is able to do that.  Health Maintenance Due   Topic Date Due    COVID-19 Vaccine (1) Never done    Aspirin/Antiplatelet Therapy  Never done    Shingles Vaccine (1 of 2) Never done    Pneumococcal Vaccines (Age 0-64) (2 of 2 - PCV) 04/05/2018    Colorectal Cancer Screening  03/17/2020    PROSTATE-SPECIFIC ANTIGEN  12/29/2022    LDCT Lung Screen  01/05/2023    RSV Vaccine (Age 60+ and Pregnant patients) (1 - 1-dose 60+ series) Never done    Influenza Vaccine (1) 09/01/2023    Lipid Panel  10/03/2023

## 2024-04-03 NOTE — TELEPHONE ENCOUNTER
Care Due:                  Date            Visit Type   Department     Provider  --------------------------------------------------------------------------------                                ESTABLISHED   [facility]  Last Visit: 09-      PATIENT      MEDICINE       Jose Moy  Next Visit: None Scheduled  None         None Found                                                            Last  Test          Frequency    Reason                     Performed    Due Date  --------------------------------------------------------------------------------    CMP.........  12 months..  atorvastatin, losartan...  01- 01-    Lipid Panel.  12 months..  atorvastatin.............  10-   09-    [facility] Embedded Care Due Messages. Reference number: [id number].   4/03/2024 5:03:06 PM CDT

## 2024-04-04 NOTE — TELEPHONE ENCOUNTER
Refill Routing Note   Medication(s) are not appropriate for processing by [facility] for the following reason(s):        Patient not seen by provider within 15 months    [facility] action(s):  Defer   Requires labs : Yes             Appointments  past 12m or future 3m with PCP    Date Provider   Last Visit   12/29/2021 Jerome Arnold MD   Next Visit   Visit date not found Jerome Arnold MD   ED visits in past 90 days: 0        Note composed:9:22 AM 04/04/2024

## 2024-04-05 RX ORDER — UMECLIDINIUM BROMIDE AND VILANTEROL TRIFENATATE 62.5; 25 UG/1; UG/1
1 POWDER RESPIRATORY (INHALATION) DAILY
Qty: 180 EACH | Refills: 0 | Status: SHIPPED | OUTPATIENT
Start: 2024-04-05

## 2024-04-09 ENCOUNTER — OFFICE VISIT (OUTPATIENT)
Dept: CARDIOLOGY | Facility: CLINIC | Age: 59
End: 2024-04-09
Payer: COMMERCIAL

## 2024-04-09 VITALS
OXYGEN SATURATION: 98 % | WEIGHT: 158.69 LBS | HEIGHT: 66 IN | DIASTOLIC BLOOD PRESSURE: 78 MMHG | HEART RATE: 77 BPM | BODY MASS INDEX: 25.5 KG/M2 | SYSTOLIC BLOOD PRESSURE: 136 MMHG

## 2024-04-09 DIAGNOSIS — I10 ESSENTIAL HYPERTENSION: Primary | ICD-10-CM

## 2024-04-09 DIAGNOSIS — Z86.73 HISTORY OF TRANSIENT ISCHEMIC ATTACK (TIA): ICD-10-CM

## 2024-04-09 DIAGNOSIS — J44.89 ASTHMA WITH COPD: Chronic | ICD-10-CM

## 2024-04-09 DIAGNOSIS — I70.0 AORTIC CALCIFICATION: ICD-10-CM

## 2024-04-09 DIAGNOSIS — I20.89 ANGINAL EQUIVALENT: ICD-10-CM

## 2024-04-09 PROCEDURE — 1159F MED LIST DOCD IN RCRD: CPT | Mod: CPTII,S$GLB,, | Performed by: INTERNAL MEDICINE

## 2024-04-09 PROCEDURE — 3008F BODY MASS INDEX DOCD: CPT | Mod: CPTII,S$GLB,, | Performed by: INTERNAL MEDICINE

## 2024-04-09 PROCEDURE — 99214 OFFICE O/P EST MOD 30 MIN: CPT | Mod: S$GLB,,, | Performed by: INTERNAL MEDICINE

## 2024-04-09 PROCEDURE — 3075F SYST BP GE 130 - 139MM HG: CPT | Mod: CPTII,S$GLB,, | Performed by: INTERNAL MEDICINE

## 2024-04-09 PROCEDURE — 4010F ACE/ARB THERAPY RXD/TAKEN: CPT | Mod: CPTII,S$GLB,, | Performed by: INTERNAL MEDICINE

## 2024-04-09 PROCEDURE — 99999 PR PBB SHADOW E&M-EST. PATIENT-LVL IV: CPT | Mod: PBBFAC,,, | Performed by: INTERNAL MEDICINE

## 2024-04-09 PROCEDURE — 3078F DIAST BP <80 MM HG: CPT | Mod: CPTII,S$GLB,, | Performed by: INTERNAL MEDICINE

## 2024-04-09 NOTE — PROGRESS NOTES
Subjective:   Patient ID:  Hector Rao is a 58 y.o. male who presents for follow-up of No chief complaint on file.  NMT/stress and echo 2022 nml  Patient denies CP, angina or anginal equivalent.  Has cut down on smoking.  Exercises every morning  Hypertension  This is a chronic problem. The current episode started more than 1 year ago. The problem has been gradually improving since onset. The problem is controlled. Pertinent negatives include no chest pain, palpitations or shortness of breath. Past treatments include angiotensin blockers. The current treatment provides moderate improvement. There are no compliance problems.    Hyperlipidemia  This is a chronic problem. The current episode started more than 1 year ago. The problem is controlled. Pertinent negatives include no chest pain or shortness of breath. Current antihyperlipidemic treatment includes statins. The current treatment provides moderate improvement of lipids. There are no compliance problems.  Risk factors for coronary artery disease include hypertension, dyslipidemia and male sex.   Shortness of Breath  This is a chronic problem. The current episode started more than 1 year ago. The problem occurs intermittently. The problem has been gradually worsening. The average episode lasts 5 minutes. Pertinent negatives include no chest pain or leg swelling. The symptoms are aggravated by any activity. He has tried rest and beta agonist inhalers for the symptoms. The treatment provided mild relief.       Review of Systems   Constitutional: Negative. Negative for weight gain.   HENT: Negative.     Eyes: Negative.    Cardiovascular: Negative.  Negative for chest pain, leg swelling and palpitations.   Respiratory: Negative.  Negative for shortness of breath.    Endocrine: Negative.    Hematologic/Lymphatic: Negative.    Skin: Negative.    Musculoskeletal:  Negative for muscle weakness.   Gastrointestinal: Negative.    Genitourinary: Negative.    Neurological:  Negative.  Negative for dizziness.   Psychiatric/Behavioral: Negative.     Allergic/Immunologic: Negative.    All other systems reviewed and are negative.    Family History   Problem Relation Age of Onset    Stroke Mother     Hypertension Mother     Stroke Father     Hypertension Father     Stroke Other         grandmother/grandfather     Past Medical History:   Diagnosis Date    Anxiety     Asthma, acute     Hyperlipidemia 7/31/2017    Joint pain     TIA (transient ischemic attack) 7/31/2017    TMJ (dislocation of temporomandibular joint)      Social History     Socioeconomic History    Marital status:      Spouse name: Myrtle    Number of children: 2   Occupational History    Occupation: Electronic Tech     Employer: [employer]     Employer:  [employer]   Tobacco Use    Smoking status: Light Smoker     Current packs/day: 2.00     Average packs/day: 2.0 packs/day for 54.3 years (108.5 total pack years)     Types: Cigarettes     Start date: 1/1/1970    Smokeless tobacco: Never   Substance and Sexual Activity    Alcohol use: Not Currently     Comment: He is a reformed alcoholic/ 2 beers per week    Drug use: Not Currently     Comment: He used to use marijuana.    Sexual activity: Yes     Partners: Female     Current Outpatient Medications on File Prior to Visit   Medication Sig Dispense Refill    albuterol (VENTOLIN HFA) 90 mcg/actuation inhaler Inhale 2 puffs into the lungs every 6 (six) hours as needed. Rescue (Patient not taking: Reported on 9/22/2023) 54 g 11    albuterol-ipratropium (DUO-NEB) 2.5 mg-0.5 mg/3 mL nebulizer solution INHALE 1 VIAL VIA NEBULIZER EVERY SIX HOURS IF NEEDED FOR WHEEZING 360 mL 11    ANORO ELLIPTA 62.5-25 mcg/actuation DsDv INHALE 1 PUFF INTO THE LUNGS ONCE DAILY. CONTROLLER 180 each 0    aspirin (ECOTRIN) 81 MG EC tablet Take 1 tablet (81 mg total) by mouth once daily. 100 tablet 3    atorvastatin (LIPITOR) 20 MG tablet Take 1 tablet (20 mg total) by mouth once  daily. 90 tablet 2    chlorzoxazone (PARAFON FORTE) 500 mg Tab Take 500 mg by mouth 3 (three) times daily.  1    losartan (COZAAR) 25 MG tablet TAKE 1 TABLET (25 MG TOTAL) BY MOUTH ONCE DAILY. 90 tablet 0     No current facility-administered medications on file prior to visit.     Review of patient's allergies indicates:   Allergen Reactions    Codeine Nausea And Vomiting     Other reaction(s): Nausea    Lexapro [escitalopram oxalate]      Erectile dysfunction.       Objective:     Physical Exam  Vitals and nursing note reviewed.   Constitutional:       Appearance: He is well-developed.   HENT:      Head: Normocephalic and atraumatic.   Eyes:      Conjunctiva/sclera: Conjunctivae normal.      Pupils: Pupils are equal, round, and reactive to light.   Cardiovascular:      Rate and Rhythm: Normal rate and regular rhythm.      Pulses: Intact distal pulses.      Heart sounds: Normal heart sounds.   Pulmonary:      Effort: Pulmonary effort is normal.      Breath sounds: Normal breath sounds.   Abdominal:      General: Bowel sounds are normal.      Palpations: Abdomen is soft.   Musculoskeletal:      Cervical back: Normal range of motion and neck supple.   Skin:     General: Skin is warm and dry.   Neurological:      Mental Status: He is alert and oriented to person, place, and time.         Assessment:     1. Essential hypertension    2. Aortic calcification    3. Anginal equivalent    4. Asthma with COPD / Emphysema    5. History of transient ischemic attack (TIA)        Plan:     Essential hypertension    Aortic calcification    Anginal equivalent    Asthma with COPD / Emphysema    History of transient ischemic attack (TIA)        Continue losartan-htn  Continue statin-hlp  Asa- Hx of tia   smoking cessation

## 2024-04-26 DIAGNOSIS — I10 ESSENTIAL HYPERTENSION: ICD-10-CM

## 2024-04-26 NOTE — TELEPHONE ENCOUNTER
No care due was identified.  [facility] Embedded Care Due Messages. Reference number: [id number].   4/26/2024 4:25:57 PM CDT

## 2024-04-27 NOTE — TELEPHONE ENCOUNTER
Refill Routing Note   Refill Routing Note   Medication(s) are not appropriate for processing by [facility] for the following reason(s):        Patient not seen by provider within 15 months  Required labs outdated    [facility] action(s):  Defer             Appointments  past 12m or future 3m with PCP    Date Provider   Last Visit   12/29/2021 Jerome Arnold MD   Next Visit   Visit date not found Jerome Arnold MD   ED visits in past 90 days: 0        Note composed:5:18 PM 04/27/2024

## 2024-04-29 ENCOUNTER — PATIENT MESSAGE (OUTPATIENT)
Dept: FAMILY MEDICINE | Facility: CLINIC | Age: 59
End: 2024-04-29
Payer: COMMERCIAL

## 2024-04-29 RX ORDER — LOSARTAN POTASSIUM 25 MG/1
25 TABLET ORAL DAILY
Qty: 30 TABLET | Refills: 0 | Status: SHIPPED | OUTPATIENT
Start: 2024-04-29 | End: 2024-05-17

## 2024-04-29 NOTE — TELEPHONE ENCOUNTER
I have signed for the following orders AND/OR meds.  Please call the patient and ask the patient to complete the E-Visit I sent him.    No orders of the defined types were placed in this encounter.      Medications Ordered This Encounter   Medications    losartan (COZAAR) 25 MG tablet     Sig: TAKE 1 TABLET (25 MG TOTAL) BY MOUTH ONCE DAILY.     Dispense:  30 tablet     Refill:  0     .       [unfilled]

## 2024-05-06 NOTE — TELEPHONE ENCOUNTER
I have signed for the following orders AND/OR meds.  Please call the patient and ask the patient to schedule the testing AND/OR inform about any medications that were sent.      Orders Placed This Encounter   Procedures    MRI Temporomandibular Joints     Standing Status:   Future     Standing Expiration Date:   11/20/2021     Order Specific Question:   Does the patient have a pacemaker or a defibrilator (Note: Some facilities may not be able to schedule an MRI for patients with pacemakers and defibrillators. You should contact your local radiology department to determine if this is the case.)?     Answer:   No     Order Specific Question:   Does the patient have a cerebral aneurysm or surgical clip, pump, nerve or brain stimulator, middle or inner ear prosthesis, or other metal implant or  been injured by a metal object(i.e. bullet, bb, shrapnel)?     Answer:   No     Order Specific Question:   Is the patient claustrophobic?     Answer:   No     Order Specific Question:   Will the patient require sedation?     Answer:   No     Order Specific Question:   Does the patient have any of the following conditions? Diabetes, History of Renal Disease or Hypertension requiring medical therapy?     Answer:   Yes     Order Specific Question:   May the Radiologist modify the order per protocol to meet the clinical needs of the patient?     Answer:   Yes     Order Specific Question:   Is this part of a Research Study?     Answer:   No     Order Specific Question:   Does the patient have on a skin patch for medication with aluminized backing?     Answer:   No    MRI Brain W WO Contrast     Standing Status:   Future     Standing Expiration Date:   11/20/2021     Order Specific Question:   Does the patient have a pacemaker or a defibrilator (Note: Some facilities may not be able to schedule an MRI for patients with pacemakers and defibrillators. You should contact your local radiology department to determine if this is the  case.)?     Answer:   No     Order Specific Question:   Does the patient have a cerebral aneurysm or surgical clip, pump, nerve or brain stimulator, middle or inner ear prosthesis, or other metal implant or  been injured by a metal object(i.e. bullet, bb, shrapnel)?     Answer:   No     Order Specific Question:   Is the patient claustrophobic?     Answer:   No     Order Specific Question:   Will the patient require sedation?     Answer:   No     Order Specific Question:   Does the patient have any of the following conditions? Diabetes, History of Renal Disease or Hypertension requiring medical therapy?     Answer:   Yes     Order Specific Question:   May the Radiologist modify the order per protocol to meet the clinical needs of the patient?     Answer:   Yes     Order Specific Question:   Is this part of a Research Study?     Answer:   No     Order Specific Question:   Will this service be billed to a Worker's Comp policy?     Answer:   No     Order Specific Question:   Does the patient have on a skin patch for medication with aluminized backing?     Answer:   No    MRI Cervical Spine Without Contrast     Standing Status:   Future     Standing Expiration Date:   11/20/2021     Order Specific Question:   Does the patient have a pacemaker or a defibrilator (Note: Some facilities may not be able to schedule an MRI for patients with pacemakers and defibrillators. You should contact your local radiology department to determine if this is the case.)?     Answer:   No     Order Specific Question:   Does the patient have a cerebral aneurysm or surgical clip, pump, nerve or brain stimulator, middle or inner ear prosthesis, or other metal implant or  been injured by a metal object(i.e. bullet, bb, shrapnel)?     Answer:   No     Order Specific Question:   Is the patient claustrophobic?     Answer:   No     Order Specific Question:   Will the patient require sedation?     Answer:   No     Order Specific Question:   Does the  patient have any of the following conditions? Diabetes, History of Renal Disease or Hypertension requiring medical therapy?     Answer:   Yes     Order Specific Question:   May the Radiologist modify the order per protocol to meet the clinical needs of the patient?     Answer:   Yes     Order Specific Question:   Is this part of a Research Study?     Answer:   No     Order Specific Question:   Recist criteria?     Answer:   Yes     Order Specific Question:   Does the patient have on a skin patch for medication with aluminized backing?     Answer:   No           I, Jaron Stein, have performed a history and physical exam on this patient, and discussed their management with the ALISSON. I have fully participated in the care of this patient. I agree with the history, physical exam, and plan as documented by the ALISSON

## 2024-05-10 ENCOUNTER — TELEPHONE (OUTPATIENT)
Dept: FAMILY MEDICINE | Facility: CLINIC | Age: 59
End: 2024-05-10
Payer: COMMERCIAL

## 2024-05-10 NOTE — TELEPHONE ENCOUNTER
FW: Unread Message Notification  Received: Today  Jerome Arnold MD [facility] Staff  He has not seen this. Please get him to do the evisit or see jhon soon.          Previous Messages       ----- Message -----  From: Jerome Arnold MD  Sent: 4/29/2024  To: Hector Rao  Subject: Unread Message Notification                      Dear Hector,    I received your request to refill your blood pressure medicine.  In review of your chart, your last blood pressure was high, or it has been over a year since your blood pressure was documented.  I need to confirm if your blood pressure is in control or not and need to plan follow up. You can arrange to be seen in person, virtually or through a simple E-Visit.    I do feel that this can be managed quickly and easily through the E-Visit if this is agreeable with you. If you would like to proceed, please [email or web address] your appointments] to complete the E-Visit message.    The last 3 blood pressures we have in your chart are below:  BP Readings from Last 3 Encounters:  04/09/24 136/78  09/22/23 115/70  03/28/23 118/66          What is an E-Visit?    An E-Visit is a way to get care for certain conditions without needing to schedule a virtual visit or to come into the clinic. We'll ask you some clinically based questions about yourself and your symptoms and your provider will evaluate, make a diagnosis, and respond with a care plan with recommendations including testing and medications as indicated, just as they would in an in-person setting.    Will I be billed for an E-Visit?    If the E-Visit results in a clinical evaluation, it will be billable to your insurance. You may receive a bill for this service, including any applicable copay amount, after the service is rendered.    Please allow up to 1 business day (Monday-Friday) for a reply. At any point in the E-Visit process, if you have not received a response within 3 business days, please call your  clinic.    If you would prefer to do an in-person or virtual visit, on video, please [email or web address] an appointment].

## 2024-05-13 NOTE — TELEPHONE ENCOUNTER
A virtual visit with dr prater was schedule per pt request to follow up pn bp meds. Appt was schedule for 5/17/24 . Pt vu

## 2024-05-17 ENCOUNTER — TELEPHONE (OUTPATIENT)
Dept: FAMILY MEDICINE | Facility: CLINIC | Age: 59
End: 2024-05-17

## 2024-05-17 ENCOUNTER — OFFICE VISIT (OUTPATIENT)
Dept: FAMILY MEDICINE | Facility: CLINIC | Age: 59
End: 2024-05-17
Payer: COMMERCIAL

## 2024-05-17 VITALS — DIASTOLIC BLOOD PRESSURE: 91 MMHG | SYSTOLIC BLOOD PRESSURE: 130 MMHG

## 2024-05-17 DIAGNOSIS — I10 ESSENTIAL HYPERTENSION: Primary | ICD-10-CM

## 2024-05-17 PROCEDURE — 1159F MED LIST DOCD IN RCRD: CPT | Mod: CPTII,95,, | Performed by: FAMILY MEDICINE

## 2024-05-17 PROCEDURE — 4010F ACE/ARB THERAPY RXD/TAKEN: CPT | Mod: CPTII,95,, | Performed by: FAMILY MEDICINE

## 2024-05-17 PROCEDURE — 3080F DIAST BP >= 90 MM HG: CPT | Mod: CPTII,95,, | Performed by: FAMILY MEDICINE

## 2024-05-17 PROCEDURE — 1160F RVW MEDS BY RX/DR IN RCRD: CPT | Mod: CPTII,95,, | Performed by: FAMILY MEDICINE

## 2024-05-17 PROCEDURE — 3075F SYST BP GE 130 - 139MM HG: CPT | Mod: CPTII,95,, | Performed by: FAMILY MEDICINE

## 2024-05-17 PROCEDURE — 99214 OFFICE O/P EST MOD 30 MIN: CPT | Mod: 95,,, | Performed by: FAMILY MEDICINE

## 2024-05-17 RX ORDER — AMLODIPINE BESYLATE 2.5 MG/1
2.5 TABLET ORAL DAILY
Qty: 90 TABLET | Refills: 0 | Status: SHIPPED | OUTPATIENT
Start: 2024-05-17 | End: 2025-05-17

## 2024-05-17 NOTE — PROGRESS NOTES
Primary Care Telemedicine Note   The patient location is:  Patient Home    The chief complaint leading to consultation is: Hypertension   Total time spent with patient: 25 min   Visit type: Virtual visit with synchronous audio only and video   Each patient to whom he or she provides medical services by telemedicine is:  (1) informed of the relationship between the physician and patient and the respective role of any other health care provider with respect to management of the patient; and (2) notified that he or she may decline to receive medical services by telemedicine and may withdraw from such care at any time.       CC:As Above   HPI:   Problem List Items Addressed This Visit       Essential hypertension - Primary    Overview     Previous history:  The patient presents with essential hypertension.   He is on losartan but he is not that compliant with it, he states.  it makes him feel weak sometimes so he takes it intermittently.   CHRONIC. STABLE. BP Reviewed.  Intermittent compliance with BP medications. No SE reported.   (-) CP, SOB, palpitations, dizziness, lightheadedness, HA, arm numbness, tingling or weakness, syncope.  Creatinine   Date Value Ref Range Status   01/18/2023 1.0 0.5 - 1.4 mg/dL Final     No results found for this or any previous visit.           Relevant Orders    Comprehensive Metabolic Panel    Lipid Panel    PSA, Screening          The patient's Health Maintenance was reviewed and the following appears to be due at this time:   Health Maintenance Due   Topic Date Due    Hemoglobin A1c (Diabetic Prevention Screening)  Never done    Shingles Vaccine (1 of 2) Never done    Pneumococcal Vaccines (Age 0-64) (2 of 2 - PCV) 04/05/2018    Colorectal Cancer Screening  03/17/2020    PROSTATE-SPECIFIC ANTIGEN  12/29/2022    LDCT Lung Screen  01/05/2023    RSV Vaccine (Age 60+ and Pregnant patients) (1 - 1-dose 60+ series) Never done    COVID-19 Vaccine (1 - 2023-24 season) Never done    Lipid Panel   10/03/2023          Outpatient Medications Prior to Visit   Medication Sig Dispense Refill    albuterol (VENTOLIN HFA) 90 mcg/actuation inhaler Inhale 2 puffs into the lungs every 6 (six) hours as needed. Rescue (Patient not taking: Reported on 4/9/2024) 54 g 11    albuterol-ipratropium (DUO-NEB) 2.5 mg-0.5 mg/3 mL nebulizer solution INHALE 1 VIAL VIA NEBULIZER EVERY SIX HOURS IF NEEDED FOR WHEEZING 360 mL 11    ANORO ELLIPTA 62.5-25 mcg/actuation DsDv INHALE 1 PUFF INTO THE LUNGS ONCE DAILY. CONTROLLER 180 each 0    aspirin (ECOTRIN) 81 MG EC tablet Take 1 tablet (81 mg total) by mouth once daily. 100 tablet 3    atorvastatin (LIPITOR) 20 MG tablet Take 1 tablet (20 mg total) by mouth once daily. 90 tablet 2    chlorzoxazone (PARAFON FORTE) 500 mg Tab Take 500 mg by mouth 3 (three) times daily. (Patient not taking: Reported on 4/9/2024)  1    losartan (COZAAR) 25 MG tablet TAKE 1 TABLET (25 MG TOTAL) BY MOUTH ONCE DAILY. 30 tablet 0     No facility-administered medications prior to visit.         PMH:  Past Medical History:   Diagnosis Date    Anxiety     Asthma, acute     Hyperlipidemia 7/31/2017    Joint pain     TIA (transient ischemic attack) 7/31/2017    TMJ (dislocation of temporomandibular joint)      Past Surgical History:   Procedure Laterality Date    KNEE SURGERY      left side     Review of patient's allergies indicates:   Allergen Reactions    Codeine Nausea And Vomiting     Other reaction(s): Nausea    Lexapro [escitalopram oxalate]      Erectile dysfunction.     Current Outpatient Medications on File Prior to Visit   Medication Sig Dispense Refill    albuterol (VENTOLIN HFA) 90 mcg/actuation inhaler Inhale 2 puffs into the lungs every 6 (six) hours as needed. Rescue (Patient not taking: Reported on 4/9/2024) 54 g 11    albuterol-ipratropium (DUO-NEB) 2.5 mg-0.5 mg/3 mL nebulizer solution INHALE 1 VIAL VIA NEBULIZER EVERY SIX HOURS IF NEEDED FOR WHEEZING 360 mL 11    ANORO ELLIPTA 62.5-25  mcg/actuation DsDv INHALE 1 PUFF INTO THE LUNGS ONCE DAILY. CONTROLLER 180 each 0    aspirin (ECOTRIN) 81 MG EC tablet Take 1 tablet (81 mg total) by mouth once daily. 100 tablet 3    atorvastatin (LIPITOR) 20 MG tablet Take 1 tablet (20 mg total) by mouth once daily. 90 tablet 2    chlorzoxazone (PARAFON FORTE) 500 mg Tab Take 500 mg by mouth 3 (three) times daily. (Patient not taking: Reported on 4/9/2024)  1    [DISCONTINUED] losartan (COZAAR) 25 MG tablet TAKE 1 TABLET (25 MG TOTAL) BY MOUTH ONCE DAILY. 30 tablet 0     No current facility-administered medications on file prior to visit.     Social History     Socioeconomic History    Marital status:      Spouse name: Myrtle    Number of children: 2   Occupational History    Occupation: Electronic Tech     Employer: [employer]     Employer:  [employer]   Tobacco Use    Smoking status: Light Smoker     Current packs/day: 2.00     Average packs/day: 2.0 packs/day for 54.4 years (108.7 ttl pk-yrs)     Types: Cigarettes     Start date: 1/1/1970    Smokeless tobacco: Never   Substance and Sexual Activity    Alcohol use: Not Currently     Comment: He is a reformed alcoholic/ 2 beers per week    Drug use: Not Currently     Comment: He used to use marijuana.    Sexual activity: Yes     Partners: Female     Social Determinants of Health     Financial Resource Strain: Low Risk  (5/16/2024)    Overall Financial Resource Strain (CARDIA)     Difficulty of Paying Living Expenses: Not hard at all   Food Insecurity: No Food Insecurity (5/16/2024)    Hunger Vital Sign     Worried About Running Out of Food in the Last Year: Never true     Ran Out of Food in the Last Year: Never true   Transportation Needs: Unknown (10/18/2018)    [facility]    PRAPARE - Transportation     Lack of Transportation (Medical): Patient declined     Lack of Transportation (Non-Medical): Patient declined   Physical Activity:  Insufficiently Active (5/16/2024)    Exercise Vital Sign     Days of Exercise per Week: 5 days     Minutes of Exercise per Session: 20 min   Stress: No Stress Concern Present (5/16/2024)    [employer] - Occupational Stress Questionnaire     Feeling of Stress : Only a little   Housing Stability: Unknown (5/16/2024)    Housing Stability Vital Sign     Unable to Pay for Housing in the Last Year: No     Family History   Problem Relation Name Age of Onset    Stroke Mother Janice     Hypertension Mother Janice     Stroke Father Ry     Hypertension Father Ry     Stroke Other          grandmother/grandfather       Review of Systems   Constitutional: Positive for malaise/fatigue.   Eyes:  Positive for blurred vision.   Cardiovascular:  Positive for paroxysmal nocturnal dyspnea. Negative for chest pain, orthopnea and palpitations.   Respiratory:  Positive for shortness of breath.    Musculoskeletal:  Positive for neck pain.   Neurological:  Negative for headaches.      Physical Exam    (Vitals taken at home and reported to us and documented)   Pulse If Self Reported:       No data to display                 Last 5 Patient Entered Readings                Current 30 Day Average:   Recent Readings        SBP (mmHg)        DBP (mmHg)        Pulse                   BP If Self Reported:       No data to display               Pulse Readings from Last 3 Encounters:   04/09/24 77   09/22/23 85   03/28/23 98      Weight If Self Reported:       No data to display               Glucose If Self Reported:       No data to display               Last 5 Patient Entered Readings                                          Most Recent A1c:            No data to display               BP (!) 130/91  if verbally reported and entered.    Constitutional: The patient is oriented to person, place, and time and appears well-developed and well-nourished with no distress.    Eyes: EOM are normal.    Pulmonary/Chest:  Effort normal. No respiratory distress.    Neurological: The patient is alert and oriented to person, place, and time.    Skin: the patient is not diaphoretic.    Psychiatric: The patient has a normal mood and affect. The behavior is normal. Judgment, thought and content normal.        DIAGNOSIS  Encounter Diagnosis   Name Primary?    Essential hypertension Yes          PLAN:     I have discontinued Hector Rao's losartan. I am also having him start on amLODIPine. Additionally, I am having him maintain his chlorzoxazone, aspirin, albuterol, albuterol-ipratropium, atorvastatin, and ANORO ELLIPTA.  No problem-specific Assessment & Plan notes found for this encounter.      No follow-ups on file.    Diagnoses and all orders for this visit:    Essential hypertension  -     Comprehensive Metabolic Panel; Future  -     Lipid Panel; Future  -     PSA, Screening; Future    Other orders  -     amLODIPine (NORVASC) 2.5 MG tablet; Take 1 tablet (2.5 mg total) by mouth once daily.    Stop the losartan as he feels that it is causing side effects.   Medications Ordered This Encounter   Medications    amLODIPine (NORVASC) 2.5 MG tablet     Sig: Take 1 tablet (2.5 mg total) by mouth once daily.     Dispense:  90 tablet     Refill:  0     .     The patient was instructed to stop the following meds:  Medications Discontinued During This Encounter   Medication Reason    losartan (COZAAR) 25 MG tablet      Orders Placed This Encounter   Procedures    Comprehensive Metabolic Panel     Standing Status:   Future     Standing Expiration Date:   5/17/2025    Lipid Panel     Standing Status:   Future     Standing Expiration Date:   5/17/2025    PSA, Screening     Standing Status:   Future     Standing Expiration Date:   5/18/2025       Medication List with Changes/Refills   New Medications    AMLODIPINE (NORVASC) 2.5 MG TABLET    Take 1 tablet (2.5 mg total) by mouth once daily.   Current Medications    ALBUTEROL (VENTOLIN HFA) 90  MCG/ACTUATION INHALER    Inhale 2 puffs into the lungs every 6 (six) hours as needed. Rescue    ALBUTEROL-IPRATROPIUM (DUO-NEB) 2.5 MG-0.5 MG/3 ML NEBULIZER SOLUTION    INHALE 1 VIAL VIA NEBULIZER EVERY SIX HOURS IF NEEDED FOR WHEEZING    ANORO ELLIPTA 62.5-25 MCG/ACTUATION DSDV    INHALE 1 PUFF INTO THE LUNGS ONCE DAILY. CONTROLLER    ASPIRIN (ECOTRIN) 81 MG EC TABLET    Take 1 tablet (81 mg total) by mouth once daily.    ATORVASTATIN (LIPITOR) 20 MG TABLET    Take 1 tablet (20 mg total) by mouth once daily.    CHLORZOXAZONE (PARAFON FORTE) 500 MG TAB    Take 500 mg by mouth 3 (three) times daily.   Discontinued Medications    LOSARTAN (COZAAR) 25 MG TABLET    TAKE 1 TABLET (25 MG TOTAL) BY MOUTH ONCE DAILY.      Medication List with Changes/Refills   New Medications    AMLODIPINE (NORVASC) 2.5 MG TABLET    Take 1 tablet (2.5 mg total) by mouth once daily.       Start Date: 5/17/2024 End Date: 5/17/2025   Current Medications    ALBUTEROL (VENTOLIN HFA) 90 MCG/ACTUATION INHALER    Inhale 2 puffs into the lungs every 6 (six) hours as needed. Rescue       Start Date: 12/29/2021End Date: --    ALBUTEROL-IPRATROPIUM (DUO-NEB) 2.5 MG-0.5 MG/3 ML NEBULIZER SOLUTION    INHALE 1 VIAL VIA NEBULIZER EVERY SIX HOURS IF NEEDED FOR WHEEZING       Start Date: 9/22/2023 End Date: --    ANORO ELLIPTA 62.5-25 MCG/ACTUATION DSDV    INHALE 1 PUFF INTO THE LUNGS ONCE DAILY. CONTROLLER       Start Date: 4/5/2024  End Date: --    ASPIRIN (ECOTRIN) 81 MG EC TABLET    Take 1 tablet (81 mg total) by mouth once daily.       Start Date: 10/30/2020End Date: 4/9/2024    ATORVASTATIN (LIPITOR) 20 MG TABLET    Take 1 tablet (20 mg total) by mouth once daily.       Start Date: 9/22/2023 End Date: --    CHLORZOXAZONE (PARAFON FORTE) 500 MG TAB    Take 500 mg by mouth 3 (three) times daily.       Start Date: 1/3/2019  End Date: --   Discontinued Medications    LOSARTAN (COZAAR) 25 MG TABLET    TAKE 1 TABLET (25 MG TOTAL) BY MOUTH ONCE DAILY.        Start Date: 4/29/2024 End Date: 5/17/2024                   F/u in 2-4 weeks with Juanjose for a bp check.           Answers submitted by the patient for this visit:  High Blood Pressure Questionnaire (Submitted on 5/16/2024)  Chief Complaint: Hypertension  Chronicity: recurrent  Onset: more than 1 year ago  Progression since onset: unchanged  Condition status: controlled  anxiety: Yes  peripheral edema: No  sweats: No  Agents associated with hypertension: no associated agents  CAD risks: family history  Compliance problems: psychosocial issues  Past treatments: nothing  Improvement on treatment: mild

## 2024-05-17 NOTE — TELEPHONE ENCOUNTER
----- Message from Jerome Arnold MD sent at 5/17/2024  9:37 AM CDT -----  Regarding: Follow up from todays visit   I have ordered labs on him to schedule fasting.  I have sent his medicine in. Please schedule him with Juanjose in 3 weeks to follow up on blood pressure med changes.  Thank you.

## 2024-05-21 ENCOUNTER — LAB VISIT (OUTPATIENT)
Dept: LAB | Facility: HOSPITAL | Age: 59
End: 2024-05-21
Attending: FAMILY MEDICINE
Payer: COMMERCIAL

## 2024-05-21 DIAGNOSIS — I10 ESSENTIAL HYPERTENSION: ICD-10-CM

## 2024-05-21 LAB
ALBUMIN SERPL BCP-MCNC: 4 G/DL (ref 3.5–5.2)
ALP SERPL-CCNC: 57 U/L (ref 55–135)
ALT SERPL W/O P-5'-P-CCNC: 31 U/L (ref 10–44)
ANION GAP SERPL CALC-SCNC: 9 MMOL/L (ref 8–16)
AST SERPL-CCNC: 23 U/L (ref 10–40)
BILIRUB SERPL-MCNC: 0.4 MG/DL (ref 0.1–1)
BUN SERPL-MCNC: 15 MG/DL (ref 6–20)
CALCIUM SERPL-MCNC: 9.7 MG/DL (ref 8.7–10.5)
CHLORIDE SERPL-SCNC: 108 MMOL/L (ref 95–110)
CHOLEST SERPL-MCNC: 149 MG/DL (ref 120–199)
CHOLEST/HDLC SERPL: 2.8 {RATIO} (ref 2–5)
CO2 SERPL-SCNC: 25 MMOL/L (ref 23–29)
COMPLEXED PSA SERPL-MCNC: 0.69 NG/ML (ref 0–4)
CREAT SERPL-MCNC: 0.9 MG/DL (ref 0.5–1.4)
EST. GFR  (NO RACE VARIABLE): >60 ML/MIN/1.73 M^2
GLUCOSE SERPL-MCNC: 91 MG/DL (ref 70–110)
HDLC SERPL-MCNC: 53 MG/DL (ref 40–75)
HDLC SERPL: 35.6 % (ref 20–50)
LDLC SERPL CALC-MCNC: 84 MG/DL (ref 63–159)
NONHDLC SERPL-MCNC: 96 MG/DL
POTASSIUM SERPL-SCNC: 4.1 MMOL/L (ref 3.5–5.1)
PROT SERPL-MCNC: 7.2 G/DL (ref 6–8.4)
SODIUM SERPL-SCNC: 142 MMOL/L (ref 136–145)
TRIGL SERPL-MCNC: 60 MG/DL (ref 30–150)

## 2024-05-21 PROCEDURE — 80061 LIPID PANEL: CPT | Performed by: FAMILY MEDICINE

## 2024-05-21 PROCEDURE — 84153 ASSAY OF PSA TOTAL: CPT | Performed by: FAMILY MEDICINE

## 2024-05-21 PROCEDURE — 36415 COLL VENOUS BLD VENIPUNCTURE: CPT | Mod: PO | Performed by: FAMILY MEDICINE

## 2024-05-21 PROCEDURE — 80053 COMPREHEN METABOLIC PANEL: CPT | Performed by: FAMILY MEDICINE

## 2024-05-27 NOTE — TELEPHONE ENCOUNTER
No care due was identified.  [facility] Embedded Care Due Messages. Reference number: [id number].   5/27/2024 12:38:18 PM CDT

## 2024-05-28 RX ORDER — LOSARTAN POTASSIUM 25 MG/1
25 TABLET ORAL
Qty: 30 TABLET | Refills: 0 | OUTPATIENT
Start: 2024-05-28

## 2024-05-28 NOTE — TELEPHONE ENCOUNTER
Quick DC. Inappropriate Request    Refill Authorization Note   Hector Rao  is requesting a refill authorization.  Brief Assessment and Rationale for Refill:  Quick Discontinue  Medication Therapy Plan:  Discontinued by: Jerome Arnold MD on 5/17/2024 09:32    Medication Reconciliation Completed:  No      Comments:     Note composed:3:52 AM 05/28/2024

## 2024-06-28 ENCOUNTER — PATIENT MESSAGE (OUTPATIENT)
Dept: FAMILY MEDICINE | Facility: CLINIC | Age: 59
End: 2024-06-28
Payer: COMMERCIAL

## 2024-07-09 ENCOUNTER — PATIENT MESSAGE (OUTPATIENT)
Dept: FAMILY MEDICINE | Facility: CLINIC | Age: 59
End: 2024-07-09
Payer: COMMERCIAL

## 2024-08-16 NOTE — TELEPHONE ENCOUNTER
No care due was identified.  [facility] Embedded Care Due Messages. Reference number: [id number].   8/16/2024 4:51:21 PM CDT

## 2024-08-17 ENCOUNTER — PATIENT MESSAGE (OUTPATIENT)
Dept: FAMILY MEDICINE | Facility: CLINIC | Age: 59
End: 2024-08-17
Payer: COMMERCIAL

## 2024-08-17 RX ORDER — AMLODIPINE BESYLATE 2.5 MG/1
2.5 TABLET ORAL EVERY MORNING
Qty: 90 TABLET | Refills: 0 | Status: SHIPPED | OUTPATIENT
Start: 2024-08-17

## 2024-08-17 NOTE — TELEPHONE ENCOUNTER
Refill Routing Note   Medication(s) are not appropriate for processing by [facility] for the following reason(s):        Required vitals abnormal    [facility] action(s):  Defer        Medication Therapy Plan: 05/17/24 (!) 130/91      Appointments  past 12m or future 3m with PCP    Date Provider   Last Visit   5/17/2024 Jerome Arnold MD   Next Visit   Visit date not found Jerome Arnold MD   ED visits in past 90 days: 0        Note composed:11:26 AM 08/17/2024

## 2024-08-22 ENCOUNTER — TELEPHONE (OUTPATIENT)
Dept: PRIMARY CARE CLINIC | Facility: CLINIC | Age: 59
End: 2024-08-22
Payer: COMMERCIAL

## 2024-08-23 NOTE — TELEPHONE ENCOUNTER
It was in May and at that time, I stopped one blood pressure medicine and started another.  He was to follow up in 2-4 weeks with Juanjose and he has not done that.  His last blood pressure was high as evidenced below and we need a follow up to do so.  He can do that via an in person visit, virtual visit or E-Visit.      BP Readings from Last 3 Encounters:   05/17/24 (!) 130/91   04/09/24 136/78   09/22/23 115/70

## 2024-08-26 NOTE — TELEPHONE ENCOUNTER
I spoke with the patient about this. Pt reports home blood pressure reading was 148/62. Pt aware of e-visit.

## 2024-09-11 ENCOUNTER — PATIENT MESSAGE (OUTPATIENT)
Dept: FAMILY MEDICINE | Facility: CLINIC | Age: 59
End: 2024-09-11
Payer: COMMERCIAL

## 2024-10-08 ENCOUNTER — TELEPHONE (OUTPATIENT)
Dept: PHARMACY | Facility: CLINIC | Age: 59
End: 2024-10-08
Payer: COMMERCIAL

## 2024-10-08 NOTE — TELEPHONE ENCOUNTER
[facility]/Population Health Chart Review & Patient Outreach Details For Medication Adherence Project    Reason for Outreach Encounter: 3rd Party payor non-compliance report ([employer], [employer], C, etc)  2.  Patient Outreach Method: Reviewed patient chart   3.   Medication in question:   Hypertension Medications               amLODIPine (NORVASC) 2.5 MG tablet TAKE ONE TABLET BY MOUTH EVERY MORNING               4.  Reviewed and or Updates Made To: Patient Chart  5. Outreach Outcomes and/or actions taken: Medication discontinued  Additional Notes:  LOSARTAN  Discontinued by: Jerome Arnold MD on 5/17/2024

## 2024-11-07 RX ORDER — UMECLIDINIUM BROMIDE AND VILANTEROL TRIFENATATE 62.5; 25 UG/1; UG/1
1 POWDER RESPIRATORY (INHALATION) DAILY
Qty: 180 EACH | Refills: 1 | Status: SHIPPED | OUTPATIENT
Start: 2024-11-07

## 2024-11-07 NOTE — TELEPHONE ENCOUNTER
No care due was identified.  [facility] Embedded Care Due Messages. Reference number: [id number].   11/07/2024 8:09:21 AM CST

## 2024-11-07 NOTE — TELEPHONE ENCOUNTER
Refill Routing Note   Medication(s) are not appropriate for processing by [facility] for the following reason(s):        Drug-disease interaction  Required vitals abnormal    [facility] action(s):  Defer             Pharmacist review requested: Yes     Appointments  past 12m or future 3m with PCP    Date Provider   Last Visit   5/17/2024 Jerome Arnold MD   Next Visit   3/19/2025 Jerome Arnold MD   ED visits in past 90 days: 0        Note composed:4:35 PM 11/07/2024

## 2024-11-07 NOTE — TELEPHONE ENCOUNTER
Refill Routing Note   Medication(s) are not appropriate for processing by [facility] for the following reason(s):        Required vitals abnormal: 148/62     [facility] action(s):  Defer  Approve           Pharmacist review requested: Yes   Extended chart review required: Yes     Appointments  past 12m or future 3m with PCP    Date Provider   Last Visit   5/17/2024 Jerome Arnold MD   Next Visit   3/19/2025 Jerome Arnold MD   ED visits in past 90 days: 0        Note composed:5:53 PM 11/07/2024

## 2024-11-08 RX ORDER — AMLODIPINE BESYLATE 2.5 MG/1
2.5 TABLET ORAL EVERY MORNING
Qty: 90 TABLET | Refills: 0 | Status: SHIPPED | OUTPATIENT
Start: 2024-11-08

## 2024-11-08 NOTE — TELEPHONE ENCOUNTER
Please call the patient and try to document a good blood pressure in the chart to meet his blood pressure requirements of control and if they remain high, let me know so that I can address this. The last 3 blood pressures are below to inform the patient.   BP Readings from Last 3 Encounters:   08/26/24 (!) 148/62   05/17/24 (!) 130/91   04/09/24 136/78

## 2024-11-22 ENCOUNTER — HOSPITAL ENCOUNTER (OUTPATIENT)
Dept: RADIOLOGY | Facility: HOSPITAL | Age: 59
Discharge: HOME | End: 2024-11-22
Attending: NURSE PRACTITIONER
Payer: COMMERCIAL

## 2024-11-22 ENCOUNTER — OFFICE VISIT (OUTPATIENT)
Dept: FAMILY MEDICINE | Facility: CLINIC | Age: 59
End: 2024-11-22
Payer: COMMERCIAL

## 2024-11-22 VITALS
SYSTOLIC BLOOD PRESSURE: 139 MMHG | DIASTOLIC BLOOD PRESSURE: 82 MMHG | HEIGHT: 66 IN | BODY MASS INDEX: 24.53 KG/M2 | OXYGEN SATURATION: 97 % | WEIGHT: 152.63 LBS | TEMPERATURE: 98 F | HEART RATE: 85 BPM

## 2024-11-22 DIAGNOSIS — R50.9 FEVER, UNSPECIFIED FEVER CAUSE: ICD-10-CM

## 2024-11-22 DIAGNOSIS — R05.9 COUGH, UNSPECIFIED TYPE: ICD-10-CM

## 2024-11-22 DIAGNOSIS — U09.9 POST-COVID SYNDROME: Primary | ICD-10-CM

## 2024-11-22 LAB
CTP QC/QA: YES
SARS-COV-2 RDRP RESP QL NAA+PROBE: NEGATIVE

## 2024-11-22 PROCEDURE — 71046 X-RAY EXAM CHEST 2 VIEWS: CPT | Mod: 26,,, | Performed by: RADIOLOGY

## 2024-11-22 PROCEDURE — 99999 PR PBB SHADOW E&M-EST. PATIENT-LVL V: CPT | Mod: PBBFAC,,, | Performed by: NURSE PRACTITIONER

## 2024-11-22 PROCEDURE — 71046 X-RAY EXAM CHEST 2 VIEWS: CPT | Mod: TC,PO

## 2024-11-22 RX ORDER — AZITHROMYCIN 250 MG/1
TABLET, FILM COATED ORAL
Qty: 6 TABLET | Refills: 0 | Status: SHIPPED | OUTPATIENT
Start: 2024-11-22 | End: 2024-11-27

## 2024-11-22 RX ORDER — DEXAMETHASONE SODIUM PHOSPHATE 4 MG/ML
8 INJECTION, SOLUTION INTRA-ARTICULAR; INTRALESIONAL; INTRAMUSCULAR; INTRAVENOUS; SOFT TISSUE
Status: COMPLETED | OUTPATIENT
Start: 2024-11-22 | End: 2024-11-22

## 2024-11-22 RX ADMIN — DEXAMETHASONE SODIUM PHOSPHATE 8 MG: 4 INJECTION, SOLUTION INTRA-ARTICULAR; INTRALESIONAL; INTRAMUSCULAR; INTRAVENOUS; SOFT TISSUE at 08:11

## 2024-11-22 NOTE — PATIENT INSTRUCTIONS
Hydrate well  Rest  F/U with PCP   Report to ER immediately if symptoms worsen or persist    Mina Young,     If you are due for any health screening(s) below please notify me so we can arrange them to be ordered and scheduled. Most healthy patients at your age complete them, but you are free to accept or refuse.     If you can't do it, I'll definitely understand. If you can, I'd certainly appreciate it!    Tests to Keep You Healthy    Colon Cancer Screening: DUE  Last Blood Pressure <= 139/89 (5/17/2024): NO  Tobacco Cessation: NO      Its time for your colon cancer screening     Colorectal cancer is one of the leading causes of cancer death for men and women but it doesnt have to be. Screenings can prevent colorectal cancer or find it early enough to treat and cure the disease.     Our records indicate that you may be overdue for colon cancer screening. A colonoscopy or stool screening test can help identify patients at risk for developing colon cancer. Cancer screenings save lives, so schedule yours today to stay healthy.     A colonoscopy is the preferred test for detecting colon cancer. It is needed only once every 10 years if results are negative. While you are sedated, a flexible, lighted tube with a tiny camera is inserted into the rectum and advanced through the colon to look for cancers.     An alternative screening test that is used at home and returned to the lab may also be used. It detects hidden blood in bowel movements which could indicate cancer in the colon. If results are positive, you will need a colonoscopy to determine if the blood is a sign of cancer. This type of follow up (diagnostic) colonoscopy usually requires additional copays as required by your insurance provider.     If you recently had your colon cancer screening performed outside of [facility], please let your Health care team know so that they can update your health record. Please contact your PCP if you have any  questions.    Were here to help you quit smoking     Our records indicated that you are still smoking. One of the best things you can do for your health is to stop smoking and we are here to help.     Talk with your provider about our Smoking Cessation Program and how we can support you on your journey.

## 2024-11-22 NOTE — PROGRESS NOTES
Subjective     Patient ID: Hector Rao is a 59 y.o. male.    Chief Complaint: Fever, Generalized Body Aches, Nasal Congestion, and Headache    Cough  This is a new (Pt states went to urgent care last week and tested positive for COVID-19) problem. The current episode started 1 to 4 weeks ago. The problem has been waxing and waning. The problem occurs every few minutes. The cough is Productive of sputum. Associated symptoms include a fever, nasal congestion, rhinorrhea and shortness of breath. Pertinent negatives include no chest pain, chills, ear congestion, ear pain, headaches, heartburn, hemoptysis, myalgias, postnasal drip, rash, sore throat, sweats, weight loss or wheezing. Nothing aggravates the symptoms. He has tried prescription cough suppressant and a beta-agonist inhaler (Prescription nasal spray) for the symptoms. The treatment provided mild (COVID-19 test negative in clinic today; pt requests steroid injection) relief. His past medical history is significant for asthma and COPD. There is no history of bronchiectasis, bronchitis, emphysema, environmental allergies or pneumonia.     Past Medical History:   Diagnosis Date    Anxiety     Asthma, acute     Hyperlipidemia 7/31/2017    Joint pain     TIA (transient ischemic attack) 7/31/2017    TMJ (dislocation of temporomandibular joint)      Social History     Socioeconomic History    Marital status:      Spouse name: Myrtle    Number of children: 2   Occupational History    Occupation: Electronic Tech     Employer: [employer]     Employer:  [employer]   Tobacco Use    Smoking status: Light Smoker     Current packs/day: 2.00     Average packs/day: 2.0 packs/day for 54.9 years (109.8 ttl pk-yrs)     Types: Cigarettes     Start date: 1/1/1970    Smokeless tobacco: Never   Substance and Sexual Activity    Alcohol use: Not Currently     Comment: He is a reformed alcoholic/ 2 beers per week    Drug use: Not Currently     Comment: He used to  use marijuana.    Sexual activity: Yes     Partners: Female     Social Drivers of Health     Financial Resource Strain: Low Risk  (5/16/2024)    Overall Financial Resource Strain (CARDIA)     Difficulty of Paying Living Expenses: Not hard at all   Food Insecurity: No Food Insecurity (5/16/2024)    Hunger Vital Sign     Worried About Running Out of Food in the Last Year: Never true     Ran Out of Food in the Last Year: Never true   Transportation Needs: Unknown (10/18/2018)    Received from Health system, Health system    PRAPARE - Transportation     Lack of Transportation (Medical): Patient declined     Lack of Transportation (Non-Medical): Patient declined   Physical Activity: Insufficiently Active (5/16/2024)    Exercise Vital Sign     Days of Exercise per Week: 5 days     Minutes of Exercise per Session: 20 min   Stress: No Stress Concern Present (5/16/2024)    [place] [employer] - Occupational Stress Questionnaire     Feeling of Stress : Only a little   Housing Stability: Unknown (5/16/2024)    Housing Stability Vital Sign     Unable to Pay for Housing in the Last Year: No     Past Surgical History:   Procedure Laterality Date    KNEE SURGERY      left side         Review of Systems   Constitutional:  Positive for fever. Negative for chills and weight loss.   HENT:  Positive for rhinorrhea and sinus pressure/congestion. Negative for ear pain, postnasal drip and sore throat.    Eyes: Negative.    Respiratory:  Positive for cough and shortness of breath. Negative for hemoptysis and wheezing.    Cardiovascular: Negative.  Negative for chest pain.   Gastrointestinal: Negative.  Negative for heartburn.   Endocrine: Negative.    Genitourinary: Negative.    Musculoskeletal: Negative.  Negative for myalgias.   Integumentary:  Negative for rash. Negative.   Allergic/Immunologic: Negative.  Negative for environmental allergies.   Neurological: Negative.  Negative for headaches.    Psychiatric/Behavioral: Negative.            Objective     Physical Exam  Vitals and nursing note reviewed.   Constitutional:       Appearance: Normal appearance.   HENT:      Head: Normocephalic.      Right Ear: Tympanic membrane, ear canal and external ear normal.      Left Ear: Tympanic membrane, ear canal and external ear normal.      Nose: Nose normal.      Mouth/Throat:      Mouth: Mucous membranes are moist.      Pharynx: Oropharynx is clear.   Eyes:      Conjunctiva/sclera: Conjunctivae normal.      Pupils: Pupils are equal, round, and reactive to light.   Cardiovascular:      Rate and Rhythm: Regular rhythm.      Pulses: Normal pulses.      Heart sounds: Normal heart sounds.   Pulmonary:      Effort: Pulmonary effort is normal.      Breath sounds: Normal breath sounds.   Abdominal:      General: Bowel sounds are normal.      Palpations: Abdomen is soft.   Musculoskeletal:         General: Normal range of motion.      Cervical back: Normal range of motion and neck supple.   Skin:     General: Skin is warm and dry.      Capillary Refill: Capillary refill takes 2 to 3 seconds.   Neurological:      Mental Status: He is alert and oriented to person, place, and time.   Psychiatric:         Mood and Affect: Mood normal.         Behavior: Behavior normal.         Thought Content: Thought content normal.         Judgment: Judgment normal.            Assessment and Plan     1. Post-COVID syndrome  2. Cough, unspecified type  3. Fever, unspecified fever cause  -     POCT COVID-19 Rapid Screening  -     X-Ray Chest PA And Lateral; Future; Expected date: 11/22/2024  -     azithromycin (Z-ANETTE) 250 MG tablet; Take 2 tablets by mouth on day 1; Take 1 tablet by mouth on days 2-5  Dispense: 6 tablet; Refill: 0  -     dexAMETHasone injection 8 mg  Hydrate well  Rest  F/U with PCP   Report to ER immediately if symptoms worsen or persist             No follow-ups on file.

## 2024-12-11 DIAGNOSIS — J44.89 ASTHMA WITH COPD: Chronic | ICD-10-CM

## 2024-12-11 RX ORDER — IPRATROPIUM BROMIDE AND ALBUTEROL SULFATE 2.5; .5 MG/3ML; MG/3ML
SOLUTION RESPIRATORY (INHALATION)
Qty: 360 ML | Refills: 1 | Status: SHIPPED | OUTPATIENT
Start: 2024-12-11

## 2024-12-11 NOTE — TELEPHONE ENCOUNTER
No care due was identified.  [facility] Embedded Care Due Messages. Reference number: [id number].   12/11/2024 9:21:03 AM CST

## 2024-12-11 NOTE — TELEPHONE ENCOUNTER
Refill Routing Note   Medication(s) are not appropriate for processing by [facility] for the following reason(s):        Drug-drug interaction    [facility] action(s):  Defer      Medication Therapy Plan: Duplicate Therapy: albuterol-ipratropium, ANORO ELLIPTA    Pharmacist review requested: Yes     Appointments  past 12m or future 3m with PCP    Date Provider   Last Visit   5/17/2024 Jerome Arnold MD   Next Visit   3/19/2025 Jerome Arnold MD   ED visits in past 90 days: 0        Note composed:12:41 PM 12/11/2024

## 2025-03-19 ENCOUNTER — OFFICE VISIT (OUTPATIENT)
Dept: FAMILY MEDICINE | Facility: CLINIC | Age: 60
End: 2025-03-19
Payer: COMMERCIAL

## 2025-03-19 VITALS
SYSTOLIC BLOOD PRESSURE: 136 MMHG | HEIGHT: 66 IN | DIASTOLIC BLOOD PRESSURE: 86 MMHG | BODY MASS INDEX: 25.01 KG/M2 | WEIGHT: 155.63 LBS | OXYGEN SATURATION: 97 % | TEMPERATURE: 98 F | RESPIRATION RATE: 18 BRPM | HEART RATE: 79 BPM

## 2025-03-19 DIAGNOSIS — Z23 IMMUNIZATION DUE: ICD-10-CM

## 2025-03-19 DIAGNOSIS — F17.200 SMOKER: ICD-10-CM

## 2025-03-19 DIAGNOSIS — Z86.0100 HISTORY OF COLON POLYPS: ICD-10-CM

## 2025-03-19 DIAGNOSIS — Z86.73 HISTORY OF TRANSIENT ISCHEMIC ATTACK (TIA): ICD-10-CM

## 2025-03-19 DIAGNOSIS — Z12.11 COLON CANCER SCREENING: ICD-10-CM

## 2025-03-19 DIAGNOSIS — I10 ESSENTIAL HYPERTENSION: ICD-10-CM

## 2025-03-19 DIAGNOSIS — Z00.00 ANNUAL PHYSICAL EXAM: Primary | ICD-10-CM

## 2025-03-19 DIAGNOSIS — Z12.5 ENCOUNTER FOR PROSTATE CANCER SCREENING: ICD-10-CM

## 2025-03-19 DIAGNOSIS — J44.89 ASTHMA WITH COPD: ICD-10-CM

## 2025-03-19 PROCEDURE — 99999 PR PBB SHADOW E&M-EST. PATIENT-LVL V: CPT | Mod: PBBFAC,,, | Performed by: FAMILY MEDICINE

## 2025-03-19 RX ORDER — IPRATROPIUM BROMIDE AND ALBUTEROL SULFATE 2.5; .5 MG/3ML; MG/3ML
SOLUTION RESPIRATORY (INHALATION)
Qty: 360 ML | Refills: 3 | Status: SHIPPED | OUTPATIENT
Start: 2025-03-19

## 2025-03-19 RX ORDER — AMLODIPINE BESYLATE 2.5 MG/1
2.5 TABLET ORAL EVERY MORNING
Qty: 90 TABLET | Refills: 3 | Status: SHIPPED | OUTPATIENT
Start: 2025-03-19

## 2025-03-19 RX ORDER — ATORVASTATIN CALCIUM 20 MG/1
20 TABLET, FILM COATED ORAL DAILY
Qty: 90 TABLET | Refills: 3 | Status: SHIPPED | OUTPATIENT
Start: 2025-03-19

## 2025-03-19 RX ORDER — UMECLIDINIUM BROMIDE AND VILANTEROL TRIFENATATE 62.5; 25 UG/1; UG/1
1 POWDER RESPIRATORY (INHALATION) DAILY
Qty: 180 EACH | Refills: 3 | Status: SHIPPED | OUTPATIENT
Start: 2025-03-19

## 2025-03-19 NOTE — PATIENT INSTRUCTIONS
Mina Young,     If you are due for any health screening(s) below please notify me so we can arrange them to be ordered and scheduled. Most healthy patients at your age complete them, but you are free to accept or refuse.     If you can't do it, I'll definitely understand. If you can, I'd certainly appreciate it!    Tests to Keep You Healthy    Colon Cancer Screening: DUE  Last Blood Pressure <= 139/89 (3/19/2025): Yes      Its time for your colon cancer screening     Colorectal cancer is one of the leading causes of cancer death for men and women but it doesnt have to be. Screenings can prevent colorectal cancer or find it early enough to treat and cure the disease.     Our records indicate that you may be overdue for colon cancer screening. A colonoscopy or stool screening test can help identify patients at risk for developing colon cancer. Cancer screenings save lives, so schedule yours today to stay healthy.     A colonoscopy is the preferred test for detecting colon cancer. It is needed only once every 10 years if results are negative. While you are sedated, a flexible, lighted tube with a tiny camera is inserted into the rectum and advanced through the colon to look for cancers.     An alternative screening test that is used at home and returned to the lab may also be used. It detects hidden blood in bowel movements which could indicate cancer in the colon. If results are positive, you will need a colonoscopy to determine if the blood is a sign of cancer. This type of follow up (diagnostic) colonoscopy usually requires additional copays as required by your insurance provider.     If you recently had your colon cancer screening performed outside of [facility], please let your Health care team know so that they can update your health record. Please contact your PCP if you have any questions.    Were here to help you quit smoking     Our records indicated that you are still smoking. One of the best  things you can do for your health is to stop smoking and we are here to help.     Talk with your provider about our Smoking Cessation Program and how we can support you on your journey.

## 2025-03-19 NOTE — PROGRESS NOTES
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  (5/16/2024)    Overall Financial Resource Strain (CARDIA)     Difficulty of Paying Living Expenses: Not hard at all   Food Insecurity: No Food Insecurity (5/16/2024)    Hunger Vital Sign     Worried About Running Out of Food in the Last Year: Never true     Ran Out of Food in the Last Year: Never true   Transportation Needs: Unknown (10/18/2018)    Received from [facility]    PRAPARE - Transportation     Lack of Transportation (Medical): Patient declined     Lack of Transportation (Non-Medical): Patient declined   Physical Activity: Insufficiently Active (5/16/2024)    Exercise Vital Sign     Days of Exercise per Week: 5 days     Minutes of Exercise per Session: 20 min   Stress: No Stress Concern Present (5/16/2024)    [employer] - Occupational Stress Questionnaire     Feeling of Stress : Only a little   Housing Stability: Unknown (5/16/2024)    Housing Stability Vital Sign     Unable to Pay for Housing in the Last Year: No

## 2025-03-20 ENCOUNTER — LAB VISIT (OUTPATIENT)
Dept: LAB | Facility: HOSPITAL | Age: 60
End: 2025-03-20
Attending: FAMILY MEDICINE
Payer: COMMERCIAL

## 2025-03-20 DIAGNOSIS — I10 ESSENTIAL HYPERTENSION: ICD-10-CM

## 2025-03-20 DIAGNOSIS — Z12.5 ENCOUNTER FOR PROSTATE CANCER SCREENING: ICD-10-CM

## 2025-03-20 LAB
ALBUMIN SERPL BCP-MCNC: 3.9 G/DL (ref 3.5–5.2)
ALP SERPL-CCNC: 62 U/L (ref 40–150)
ALT SERPL W/O P-5'-P-CCNC: 18 U/L (ref 10–44)
ANION GAP SERPL CALC-SCNC: 8 MMOL/L (ref 8–16)
AST SERPL-CCNC: 17 U/L (ref 10–40)
BILIRUB SERPL-MCNC: 0.4 MG/DL (ref 0.1–1)
BUN SERPL-MCNC: 16 MG/DL (ref 8–23)
CALCIUM SERPL-MCNC: 9.3 MG/DL (ref 8.7–10.5)
CHLORIDE SERPL-SCNC: 106 MMOL/L (ref 95–110)
CHOLEST SERPL-MCNC: 142 MG/DL (ref 120–199)
CHOLEST/HDLC SERPL: 2.8 {RATIO} (ref 2–5)
CO2 SERPL-SCNC: 25 MMOL/L (ref 23–29)
COMPLEXED PSA SERPL-MCNC: 0.48 NG/ML (ref 0–4)
CREAT SERPL-MCNC: 1 MG/DL (ref 0.5–1.4)
EST. GFR  (NO RACE VARIABLE): >60 ML/MIN/1.73 M^2
GLUCOSE SERPL-MCNC: 94 MG/DL (ref 70–110)
HDLC SERPL-MCNC: 50 MG/DL (ref 40–75)
HDLC SERPL: 35.2 % (ref 20–50)
LDLC SERPL CALC-MCNC: 80.4 MG/DL (ref 63–159)
NONHDLC SERPL-MCNC: 92 MG/DL
POTASSIUM SERPL-SCNC: 4.3 MMOL/L (ref 3.5–5.1)
PROT SERPL-MCNC: 7.1 G/DL (ref 6–8.4)
SODIUM SERPL-SCNC: 139 MMOL/L (ref 136–145)
TRIGL SERPL-MCNC: 58 MG/DL (ref 30–150)

## 2025-03-20 PROCEDURE — 80061 LIPID PANEL: CPT | Performed by: FAMILY MEDICINE

## 2025-03-20 PROCEDURE — 80053 COMPREHEN METABOLIC PANEL: CPT | Performed by: FAMILY MEDICINE

## 2025-03-20 PROCEDURE — 36415 COLL VENOUS BLD VENIPUNCTURE: CPT | Mod: PO | Performed by: FAMILY MEDICINE

## 2025-03-20 PROCEDURE — 84153 ASSAY OF PSA TOTAL: CPT | Performed by: FAMILY MEDICINE

## 2025-03-21 ENCOUNTER — RESULTS FOLLOW-UP (OUTPATIENT)
Dept: FAMILY MEDICINE | Facility: CLINIC | Age: 60
End: 2025-03-21

## 2025-03-21 NOTE — PROGRESS NOTES
I have reviewed the labs and am recommending the following:  LIPID NORMAL ON MEDS-The cholesterol panel screening showed levels that are considered at target with the current medications. Continue meds & check annually.    Health maintenance items that remain on your list that need to be arranged are listed below.   Please notify me if you are pre  pared to get them completed.    Hemoglobin A1c (Diabetic Prevention Screening) Never done  LDCT Lung Screen due on 01/05/2023  RSV Vaccine (Age 60+ and Pregnant patients)(1 - Risk 60-74 years 1-dose series) Never done    Dr. Jerome Arnold

## 2025-03-22 ENCOUNTER — HOSPITAL ENCOUNTER (OUTPATIENT)
Dept: RADIOLOGY | Facility: HOSPITAL | Age: 60
Discharge: HOME | End: 2025-03-22
Attending: FAMILY MEDICINE
Payer: COMMERCIAL

## 2025-03-22 DIAGNOSIS — F17.200 SMOKER: ICD-10-CM

## 2025-03-22 PROCEDURE — 71271 CT THORAX LUNG CANCER SCR C-: CPT | Mod: TC,PO

## 2025-03-22 PROCEDURE — 71271 CT THORAX LUNG CANCER SCR C-: CPT | Mod: 26,,, | Performed by: RADIOLOGY

## 2025-03-24 ENCOUNTER — PATIENT MESSAGE (OUTPATIENT)
Dept: FAMILY MEDICINE | Facility: CLINIC | Age: 60
End: 2025-03-24
Payer: COMMERCIAL

## 2025-03-26 ENCOUNTER — TELEPHONE (OUTPATIENT)
Dept: FAMILY MEDICINE | Facility: CLINIC | Age: 60
End: 2025-03-26
Payer: COMMERCIAL

## 2025-03-26 DIAGNOSIS — R91.8 ABNORMAL CT LUNG SCREENING: Primary | ICD-10-CM

## 2025-03-26 NOTE — TELEPHONE ENCOUNTER
Please call the patient and let him know that I have placed an order for the referral so that the lung specialist can see him and give recommendations on how to work this abnormality up.       I have signed for the following orders AND/OR meds.  Please call the patient and ask the patient to schedule the testing AND/OR inform about any medications that were sent.      Orders Placed This Encounter   Procedures    Ambulatory referral/consult to Pulmonology     Standing Status:   Future     Expected Date:   4/2/2025     Expiration Date:   4/26/2026     Referral Priority:   Routine     Referral Type:   Consultation     Referral Reason:   Specialty Services Required     Requested Specialty:   Pulmonary Disease     Number of Visits Requested:   1

## 2025-03-26 NOTE — TELEPHONE ENCOUNTER
----- Message from Nurse Chou' sent at 3/26/2025  9:47 AM CDT -----  Regarding: FW: Lung Screen #3  Received message from radiology in regards to pt's LDCT results. I can get pt into our lung nodule clinic here at the cancer center, if appropriate. Would just need pt to be notified and pulmonology referral placed. Thanks,Effie WoodruffOncology Nurse NavigatorSt Tammany Cancer CenterA [facility] of [facility] HealthP:[phone] F:[phone]  ----- Message -----  From: Sonya Mckeon RT  Sent: 3/24/2025  10:02 AM CDT  To: [facility] Navigation Outpatient  Subject: Lung Screen #3                                   This patients lung rad score is also 4B. This is it for today. Lol! Thank you. Have a great day!Kendell

## 2025-03-27 ENCOUNTER — TELEPHONE (OUTPATIENT)
Facility: CLINIC | Age: 60
End: 2025-03-27
Payer: COMMERCIAL

## 2025-03-27 NOTE — TELEPHONE ENCOUNTER
Pt aware of test results , he is aware he has a pulmonary nodule . Pt verbalized understanding and he said he has a visit with the lung doctor

## 2025-03-27 NOTE — NURSING
Received message for pt with pulmonary nodule.   Reached out to patient and scheduled a visit in our lung clinic for 4/1 with Dr Banegas.  Date, time, and location of appt has been confirmed.      Oncology Navigation   Intake  Cancer Type: LDCT/Incidental Lung Finding  Type of Referral: Internal  Date of Referral: 03/26/25  Initial Nurse Navigator Contact: 03/27/25  Referral to Initial Contact Timeline (days): 1  First Appointment Available: 04/01/25  Appointment Date: 04/01/25  First Available Date vs. Scheduled Date (days): 0     Treatment  Current Status: Staging work-up             Procedures: CT  CT Schedule Date: 03/22/25                 Acuity      Follow Up  No follow-ups on file.

## 2025-03-27 NOTE — PROGRESS NOTES
Confirm that he has scheduled a pulmonary appointment as I have placed an order in another encounter.

## 2025-04-01 ENCOUNTER — DOCUMENTATION ONLY (OUTPATIENT)
Facility: CLINIC | Age: 60
End: 2025-04-01

## 2025-04-01 ENCOUNTER — TUMOR BOARD CONFERENCE (OUTPATIENT)
Dept: HEMATOLOGY/ONCOLOGY | Facility: CLINIC | Age: 60
End: 2025-04-01
Payer: COMMERCIAL

## 2025-04-01 ENCOUNTER — OFFICE VISIT (OUTPATIENT)
Facility: CLINIC | Age: 60
End: 2025-04-01
Payer: COMMERCIAL

## 2025-04-01 ENCOUNTER — TELEPHONE (OUTPATIENT)
Facility: CLINIC | Age: 60
End: 2025-04-01

## 2025-04-01 VITALS
RESPIRATION RATE: 16 BRPM | SYSTOLIC BLOOD PRESSURE: 166 MMHG | TEMPERATURE: 98 F | BODY MASS INDEX: 25.78 KG/M2 | OXYGEN SATURATION: 97 % | HEART RATE: 71 BPM | DIASTOLIC BLOOD PRESSURE: 86 MMHG | HEIGHT: 65 IN | WEIGHT: 154.75 LBS

## 2025-04-01 DIAGNOSIS — R91.8 ABNORMAL CT LUNG SCREENING: Primary | ICD-10-CM

## 2025-04-01 DIAGNOSIS — Z72.0 TOBACCO ABUSE: ICD-10-CM

## 2025-04-01 PROCEDURE — 99999 PR PBB SHADOW E&M-EST. PATIENT-LVL V: CPT | Mod: PBBFAC,,, | Performed by: INTERNAL MEDICINE

## 2025-04-01 PROCEDURE — 3077F SYST BP >= 140 MM HG: CPT | Mod: CPTII,S$GLB,, | Performed by: INTERNAL MEDICINE

## 2025-04-01 PROCEDURE — 99205 OFFICE O/P NEW HI 60 MIN: CPT | Mod: S$GLB,,, | Performed by: INTERNAL MEDICINE

## 2025-04-01 PROCEDURE — 1159F MED LIST DOCD IN RCRD: CPT | Mod: CPTII,S$GLB,, | Performed by: INTERNAL MEDICINE

## 2025-04-01 PROCEDURE — 3008F BODY MASS INDEX DOCD: CPT | Mod: CPTII,S$GLB,, | Performed by: INTERNAL MEDICINE

## 2025-04-01 PROCEDURE — 3079F DIAST BP 80-89 MM HG: CPT | Mod: CPTII,S$GLB,, | Performed by: INTERNAL MEDICINE

## 2025-04-01 NOTE — PROGRESS NOTES
Met with patient and wife in pulmonary clinic today.  Explained my role as navigator.  Reviewed plan of care and answered questions.    Plan is for PET and bronch.  My contact information was provided and pt was encouraged to call with any questions or concerns.  Pt and wife verbalized understanding.

## 2025-04-01 NOTE — PROGRESS NOTES
[facility]   Initial Office Visit      SUBJECTIVE:     History of Present Illness:  Patient is a 59 y.o. male presents for No chief complaint on file.      History of Present Illness    CHIEF COMPLAINT:  - Mr. Rao presents for follow-up on a suspicious lung nodule found on a recent CT.    HPI:  Mr. Rao is a 60-year-old male here to discuss the results of a recent CT that revealed a suspicious lung nodule in the left lower lobe. The nodule was not present on a CT performed 3 years ago. He reports no current symptoms related to this finding. He had a respiratory infection earlier this year with productive cough of greenish sputum until he received antibiotics and a steroid injection. The infection resolved after treatment.    He has a history of childhood asthma which improved.    He has a significant smoking history, having started at age 11 and smoking up to 2.5-3 packs per day. In the last couple of years, he has reduced his smoking to 14-15 cigarettes per day. He began cutting back about 3 years ago, with a goal to stop when he turned 58 but has found it difficult to quit.    He reports feeling well currently, only noting some shortness of breath when carrying a ladder for a couple hundred yards. He uses a Noro inhaler and a nebulizer for his breathing.    He has a history of asbestos exposure as well as other potential chemical exposures from working at [employer] in the past.    He denies pain, cough, or other respiratory symptoms related to the lung nodule. He denies having had any surgeries, radiation, or pneumonia in his right upper lobe.    MEDICATIONS:  - Norvasc, for high blood pressure  - Nebulizer, for breathing issues    MEDICAL HISTORY:  - Asthma: Childhood, resolved  - COPD  - Emphysema  - Hypertension  - Diabetes    SURGICAL HISTORY:  - Knee surgery: Date not specified, indication and details not provided    SOCIAL HISTORY:  - Smoking: Current smoker, reduced from 2.5-3  "packs/day to 14-15 cigarettes/day over the last 3 years. Started smoking at age 11.  - Occupation: Worked at [employer] in the past    Marital status:       ROS:  Respiratory: +exertional dyspnea, +productive cough  Psychiatric: +anxiety         Past Medical History:   Diagnosis Date    Anxiety     Asthma, acute     Hyperlipidemia 7/31/2017    Joint pain     TIA (transient ischemic attack) 7/31/2017    TMJ (dislocation of temporomandibular joint)      Past Surgical History:   Procedure Laterality Date    KNEE SURGERY      left side     Family History   Problem Relation Name Age of Onset    Stroke Mother Janice     Hypertension Mother Janice     Stroke Father Ry     Hypertension Father Ry     Stroke Other          grandmother/grandfather     Social History[1]     Review of patient's allergies indicates:   Allergen Reactions    Codeine Nausea And Vomiting     Other reaction(s): Nausea    Lexapro [escitalopram oxalate]      Erectile dysfunction.       Medications Ordered Prior to Encounter[2]      OBJECTIVE:     Vital Signs (Most Recent)  Visit Vitals  BP (!) 166/86 (BP Location: Right arm, Patient Position: Sitting)   Pulse 71   Temp 97.9 °F (36.6 °C) (Temporal)   Resp 16   Ht 5' 5" (1.651 m)   Wt 70.2 kg (154 lb 12.2 oz)   SpO2 97%   BMI 25.75 kg/m²     5' 5" (1.651 m)  70.2 kg (154 lb 12.2 oz)     Physical Exam    General: No acute distress. Well-developed. Well-nourished.  Eyes: Sclerae anicteric. Normal conjunctivae.  HENT: Normocephalic. Atraumatic.  Cardiovascular: Normal S1, S2. Regular rate. Regular rhythm.  Respiratory: Normal respiratory effort. Clear to auscultation bilaterally. No rales. No rhonchi. No wheezing.  Musculoskeletal: No  obvious deformity.  Extremities: No lower extremity edema. No clubbing.  Neurological: Awake. Alert. No slurred speech.    TEST RESULTS:  - CT Chest: 2022, No corresponding nodule present in left lower lobe, old scarring noted in right upper lobe  - CT Chest: " 3/22/25, personally reviewed and independently interpreted. New 2.5 cm nodule in left lower lobe, suspicious for lung cancer. Old scarring in right upper lobe still present. Some emphysema/COPD changes noted.           Diagnostic Results:    Echocardiogram  Results for orders placed during the hospital encounter of 02/21/22    Echo    Interpretation Summary  · The left ventricle is normal in size with concentric remodeling and normal systolic function.  · The estimated ejection fraction is 60%.  · Normal left ventricular diastolic function.  · Normal right ventricular size with normal right ventricular systolic function.  · Normal central venous pressure (3 mmHg).    Most Recent Nuclear Stress Test Results  Results for orders placed during the hospital encounter of 02/21/22    Nuclear Stress - 3rd Party    Interpretation Summary    Normal myocardial perfusion scan. There is no evidence of myocardial ischemia or infarction.    There is a  mild intensity fixed perfusion abnormality in the inferior wall of the left ventricle secondary to diaphragm attenuation.    The gated perfusion images showed an ejection fraction of 67% at rest. The gated perfusion images showed an ejection fraction of 67% post stress.    There is normal wall motion at rest and post stress.    LV cavity size is normal at rest and normal at stress.    The EKG portion of this study is negative for ischemia.    The patient reported no chest pain during the stress test.    There were no arrhythmias during stress.    ADT staff assisted with procedure: Easton Omalley CV tech assisted with procedure.IV placed and administered lexiscan and nuclear tracer.After procedure iv removed and dressing applied with instructions to patient. Xavier Omalley [facility] performed nuclear imaging.    Most Recent Cardiac PET Stress Test Results  No results found for this or any previous visit.    Most Recent Cardiovascular Angiogram  No results found for this or any previous  visit.    SpO2: 97 %        Test(s) Reviewed  I have personally reviewed the following in detail:  [] Chest Xray Images   [x] CT Images   [] Echocardiogram   [] Labs   [] Other:       Assessment:         ICD-10-CM ICD-9-CM   1. Abnormal CT lung screening  R91.8 793.19   2. Tobacco abuse  Z72.0 305.1        Plan:   Assessment & Plan    Suspicious lung nodule in left lower lobe, approximately 2.5 cm, not present on previous CT from 2022.  Nodule characteristics (spiculation) apparent growth rate over 3 years highly suggestive of lung cancer.  Surgery (lobectomy) would be preferred treatment if lung function adequate; radiation therapy as alternative.  Adjuvant chemotherapy may be recommended post-op to reduce recurrence risk.  COPD/emphysema, but likely can tolerate surgery based on current functional status.    LUNG NODULE:  - Explained nature of suspicious lung nodule, reasons for concern about malignancy, and importance of early detection and intervention for best outcomes.  - Discussed biopsy procedure using bronchoscopy and robotic navigation, clarifying that biopsy will not cause cancer to spread.  - Explained PET scan process and purpose.  - Reviewed potential treatment options if cancer is confirmed, including surgery and radiation.  - Ordered CT-guided robotic bronchoscopy for lung nodule biopsy and lymph node sampling.  - Ordered PET scan prior to biopsy if scheduling allows.    NICOTINE DEPENDENCE:  - Discussed smoking cessation strongly advised but acknowledged as challenging at this time.  - Mr. Rao to work on smoking cessation when he is ready.    CHRONIC OBSTRUCTIVE PULMONARY DISEASE:  - Ordered updated pulmonary function tests.    FOLLOW-UP:  - Follow up after biopsy results to discuss findings and treatment plan.  - Contact the office to schedule biopsy procedure and PET scan.           Abnormal CT lung screening  -     Ambulatory referral/consult to Pulmonology  -     NM PET CT FDG Skull Base to Mid  Thigh; Future; Expected date: 04/01/2025  -     Place in Outpatient; Standing  -     Case Request Endoscopy: ROBOTIC BRONCHOSCOPY, ENDOBRONCHIAL ULTRASOUND (EBUS); Standing  -     Vital signs; Standing  -     Verify informed consent; Standing  -     Assess per unit routine; Standing  -     Insert peripheral IV if not present; Standing  -     Remove glasses and/or dentures; Standing  -     Undress from the waist up; Standing  -     Notify Physician; Standing  -     Diet NPO; Standing  -     Full code; Standing    Tobacco abuse       A total of 64 minutes were spent on this encounter on the date of the visit. This time includes both face-to-face and non-face-to-face time and encompasses data review, examination, and documentation, as well as any necessary medication management, personal interpretation of imaging and other studies, counseling, and coordination of care. This time is exclusive of any separately billed procedures or other services.    See labs and med orders.    Medications have been reviewed and reconciled.      Portions of this note may have been created with voice recognition software.  Grammatical, syntax and spelling errors may be inevitable.    This note was generated with the assistance of ambient listening technology. Verbal consent was obtained by the patient and accompanying visitor(s) for the recording of patient appointment to facilitate this note. I attest to having reviewed and edited the generated note for accuracy, though some syntax or spelling errors may persist. Please contact the author of this note for any clarification.               [1]   Social History  Tobacco Use    Smoking status: Light Smoker     Current packs/day: 2.00     Average packs/day: 2.0 packs/day for 55.2 years (110.5 ttl pk-yrs)     Types: Cigarettes     Start date: 1/1/1970    Smokeless tobacco: Never   Substance Use Topics    Alcohol use: Not Currently     Comment: He is a reformed alcoholic/ 2 beers per week     Drug use: Not Currently     Comment: He used to use marijuana.   [2]   Current Outpatient Medications on File Prior to Visit   Medication Sig Dispense Refill    albuterol-ipratropium (DUO-NEB) 2.5 mg-0.5 mg/3 mL nebulizer solution USE 1 VIAL IN NEBULIZER  EVERY SIX HOURS IF NEEDED FOR WHEEZING 360 mL 3    amLODIPine (NORVASC) 2.5 MG tablet Take 1 tablet (2.5 mg total) by mouth every morning. 90 tablet 3    atorvastatin (LIPITOR) 20 MG tablet Take 1 tablet (20 mg total) by mouth once daily. 90 tablet 3    umeclidinium-vilanteroL (ANORO ELLIPTA) 62.5-25 mcg/actuation DsDv Inhale 1 puff into the lungs once daily. Controller 180 each 3     No current facility-administered medications on file prior to visit.

## 2025-04-02 NOTE — PROGRESS NOTES
[facility]      THORACIC MULTIDISCIPLINARY TUMOR BOARD  PATIENT REVIEW FORM  ___________________________________________________________________    CLINIC #: [id number]  DATE: 04/02/2025    TUMOR SITE:   Cancer Type: Other (lung nodule)     Cancer Site: lower lobe     Tumor Laterality: Left     Cancer Staging Complete: No     Presenting Hospital / Clinic: [facility]     Virtual Tumor Board Conference: In person       PRESENTER:   Presenter: Dr. Banegas     Specialties Present: Medical Oncology; Hematology; Radiation Oncology; Surgical Oncology; Pathology; Navigation; Research; Integrative Oncology; Radiology; Pulmonology       PATIENT SUMMARY:   59-year-old male here to discuss the results of a recent CT that revealed a suspicious lung nodule in the left lower lobe. The nodule was not present on a CT performed 3 years ago. He reports no current symptoms related to this finding. He had a respiratory infection earlier this year with productive cough of greenish sputum until he received antibiotics and a steroid injection. The infection resolved after treatment.     He has a history of childhood asthma which improved.  He has a significant smoking history, having started at age 11 and smoking up to 2.5-3 packs per day. In the last couple of years, he has reduced his smoking to 14-15 cigarettes per day. He began cutting back about 3 years ago, with a goal to stop when he turned 58 but has found it difficult to quit.     He reports feeling well currently, only noting some shortness of breath when carrying a ladder for a couple hundred yards. He uses a Noro inhaler and a nebulizer for his breathing.     He has a history of asbestos exposure as well as other potential chemical exposures from working at [employer] in the past.    LDCT 3/22/25:  FINDINGS:  Lungs: There are abnormal opacities that require further evaluation.   There remains a large linear calcified scar at the right lung apex unchanged from prior studies.  There is a new spiculated 2.6 cm mass now seen in the superior segment of the left lower lobe suspicious for neoplasm.  The lungs show findings consistent with emphysema.     Pleura:   No effusion..  Heart and pericardium: Normal size without effusion.  Aorta and vasculature: Atherosclerosis including coronary arteries.  Chest wall and skeletal structures: Unremarkable except age-appropriate degenerative changes.  Upper abdomen: Unremarkable.     Impression:  Lung-RADS Category: 4B - Suspicious - consultation advised - possible next steps  Chest CT, tissue sampling and-or PET/CT.    Background Information  Patient Status: a new patient  Reason for Consultation: Initial Presentation  Treatment to Date: None       BOARD RECOMMENDATIONS:   Recommended Plan (General)  Recommended Plan: Imaging; Biopsy  Recommended Plan Note: PET CT, and Robotic Bronch w/EBUS       CONSULT NEEDED:     [] Surgery    [] Hem/Onc    [] Rad/Onc    [] Dietary                 [] Social Service    [] Psychology       [] Pulmonology    Cancer Staging   No matching staging information was found for the patient.      [x] Angi'l Treatment Guidelines reviewed and care planned is consistent with guidelines.         (i.e., NCCN, NCI, PD, ACO, AUA, etc.)    PRESENTATION AT CANCER CONFERENCE:   Presentation at Cancer Conference: Prospective       CLINICAL TRIAL ELIGIBILITY:   Clinical Trial Eligibility  Clinical Trial Eligibility: Not discussed         Verenice TIFFANIE Barrientos

## 2025-04-07 ENCOUNTER — HOSPITAL ENCOUNTER (OUTPATIENT)
Dept: RADIOLOGY | Facility: HOSPITAL | Age: 60
Discharge: HOME | End: 2025-04-07
Attending: INTERNAL MEDICINE
Payer: COMMERCIAL

## 2025-04-07 DIAGNOSIS — R91.8 ABNORMAL CT LUNG SCREENING: ICD-10-CM

## 2025-04-07 LAB — GLUCOSE SERPL-MCNC: 85 MG/DL (ref 70–110)

## 2025-04-07 PROCEDURE — 78815 PET IMAGE W/CT SKULL-THIGH: CPT | Mod: TC,PN

## 2025-04-07 PROCEDURE — A9552 F18 FDG: HCPCS | Mod: PN | Performed by: INTERNAL MEDICINE

## 2025-04-07 PROCEDURE — 78815 PET IMAGE W/CT SKULL-THIGH: CPT | Mod: 26,PI,, | Performed by: STUDENT IN AN ORGANIZED HEALTH CARE EDUCATION/TRAINING PROGRAM

## 2025-04-07 RX ORDER — FLUDEOXYGLUCOSE F18 500 MCI/ML
13.4 INJECTION INTRAVENOUS
Status: COMPLETED | OUTPATIENT
Start: 2025-04-07 | End: 2025-04-07

## 2025-04-07 RX ADMIN — FLUDEOXYGLUCOSE F-18 13.4 MILLICURIE: 500 INJECTION INTRAVENOUS at 01:04

## 2025-04-07 NOTE — PROGRESS NOTES
PET Imaging Questionnaire    Are you a Diabetic? Recent Blood Sugar level? No    Are you anemic? Bone Marrow Stimulation Meds? No    Have you had a CT Scan, if so when & where was your last one? Yes -     Have you had a PET Scan, if so when & where was your last one? No    Chemotherapy or currently on Chemotherapy? No    Radiation therapy? No    Surgical History:   Past Surgical History:   Procedure Laterality Date    KNEE SURGERY      left side        Have you been fasting for at least 6 hours? Yes    Is there any chance you may be pregnant or breastfeeding? No    Assay: 13.7 MCi@:13:20   Injection Site:RT AC    Residual: 0.3 mCi@: 13:24   Technologist: Kirill Hanks Injected:13.4 mCi

## 2025-04-10 ENCOUNTER — HOSPITAL ENCOUNTER (OUTPATIENT)
Dept: CARDIOLOGY | Facility: HOSPITAL | Age: 60
Discharge: HOME | End: 2025-04-10
Attending: ANESTHESIOLOGY
Payer: COMMERCIAL

## 2025-04-10 ENCOUNTER — OFFICE VISIT (OUTPATIENT)
Dept: CARDIOLOGY | Facility: CLINIC | Age: 60
End: 2025-04-10
Payer: COMMERCIAL

## 2025-04-10 VITALS
WEIGHT: 156.13 LBS | OXYGEN SATURATION: 97 % | SYSTOLIC BLOOD PRESSURE: 130 MMHG | HEIGHT: 65 IN | HEART RATE: 78 BPM | BODY MASS INDEX: 26.01 KG/M2 | DIASTOLIC BLOOD PRESSURE: 80 MMHG

## 2025-04-10 DIAGNOSIS — I10 ESSENTIAL HYPERTENSION: ICD-10-CM

## 2025-04-10 DIAGNOSIS — R91.8 ABNORMAL CT LUNG SCREENING: ICD-10-CM

## 2025-04-10 DIAGNOSIS — Z86.73 HISTORY OF TRANSIENT ISCHEMIC ATTACK (TIA): Primary | ICD-10-CM

## 2025-04-10 DIAGNOSIS — J44.89 ASTHMA WITH COPD: Chronic | ICD-10-CM

## 2025-04-10 DIAGNOSIS — I70.0 AORTIC CALCIFICATION: ICD-10-CM

## 2025-04-10 DIAGNOSIS — E78.2 MIXED HYPERLIPIDEMIA: Chronic | ICD-10-CM

## 2025-04-10 DIAGNOSIS — R06.02 SOB (SHORTNESS OF BREATH): ICD-10-CM

## 2025-04-10 DIAGNOSIS — I20.89 ANGINAL EQUIVALENT: ICD-10-CM

## 2025-04-10 DIAGNOSIS — R53.83 OTHER FATIGUE: ICD-10-CM

## 2025-04-10 LAB
OHS QRS DURATION: 64 MS
OHS QTC CALCULATION: 417 MS

## 2025-04-10 PROCEDURE — 99999 PR PBB SHADOW E&M-EST. PATIENT-LVL III: CPT | Mod: PBBFAC,,, | Performed by: INTERNAL MEDICINE

## 2025-04-10 PROCEDURE — 93005 ELECTROCARDIOGRAM TRACING: CPT | Mod: PO

## 2025-04-10 PROCEDURE — 93010 ELECTROCARDIOGRAM REPORT: CPT | Mod: ,,, | Performed by: INTERNAL MEDICINE

## 2025-04-10 NOTE — PROGRESS NOTES
Subjective:   Patient ID:  Hector Rao is a 59 y.o. male who presents for follow-up of No chief complaint on file.  4-24:  NMT/stress and echo 2022 nml  Patient denies CP, angina or anginal equivalent.  Has cut down on smoking.  Exercises every morning    Today:  Patient denies CP, angina or anginal equivalent.  BP stable at home  -160  EKG is normal    Hypertension  This is a chronic problem. The current episode started more than 1 year ago. The problem has been gradually improving since onset. The problem is controlled. Pertinent negatives include no chest pain, palpitations or shortness of breath. Past treatments include angiotensin blockers. The current treatment provides moderate improvement. There are no compliance problems.    Hyperlipidemia  This is a chronic problem. The current episode started more than 1 year ago. The problem is controlled. Pertinent negatives include no chest pain or shortness of breath. Current antihyperlipidemic treatment includes statins. The current treatment provides moderate improvement of lipids. There are no compliance problems.  Risk factors for coronary artery disease include hypertension, dyslipidemia and male sex.   Shortness of Breath  This is a chronic problem. The current episode started more than 1 year ago. The problem occurs intermittently. The problem has been gradually worsening. The average episode lasts 5 minutes. Pertinent negatives include no chest pain or leg swelling. The symptoms are aggravated by any activity. He has tried rest and beta agonist inhalers for the symptoms. The treatment provided mild relief.     Interpretation Summary NMT/stress 2022  Show Result Comparison     Normal myocardial perfusion scan. There is no evidence of myocardial ischemia or infarction.    There is a  mild intensity fixed perfusion abnormality in the inferior wall of the left ventricle secondary to diaphragm attenuation.    The gated perfusion images showed an ejection  fraction of 67% at rest. The gated perfusion images showed an ejection fraction of 67% post stress.    There is normal wall motion at rest and post stress.    LV cavity size is normal at rest and normal at stress.    The EKG portion of this study is negative for ischemia.    The patient reported no chest pain during the stress test.    There were no arrhythmias during stress.   Summary echo  Show Result ComparisonThe left ventricle is normal in size with concentric remodeling and normal systolic function.  The estimated ejection fraction is 60%.  Normal left ventricular diastolic function.  Normal right ventricular size with normal right ventricular systolic function.  Normal central venous pressure (3 mmHg).         Review of Systems   Constitutional: Negative. Negative for weight gain.   HENT: Negative.     Eyes: Negative.    Cardiovascular: Negative.  Negative for chest pain, leg swelling and palpitations.   Respiratory: Negative.  Negative for shortness of breath.    Endocrine: Negative.    Hematologic/Lymphatic: Negative.    Skin: Negative.    Musculoskeletal:  Negative for muscle weakness.   Gastrointestinal: Negative.    Genitourinary: Negative.    Neurological: Negative.  Negative for dizziness.   Psychiatric/Behavioral: Negative.     Allergic/Immunologic: Negative.    All other systems reviewed and are negative.    Family History   Problem Relation Name Age of Onset    Stroke Mother Janice     Hypertension Mother Janice     Stroke Father Ry     Hypertension Father Ry     Stroke Other          grandmother/grandfather     Past Medical History:   Diagnosis Date    Anxiety     Asthma, acute     COPD (chronic obstructive pulmonary disease)     Digestive disorder     GE reflux     Hyperlipidemia 07/31/2017    Hypertension     Joint pain     TIA (transient ischemic attack) 07/31/2017    TMJ (dislocation of temporomandibular joint)      Social History[1]  Medications Ordered Prior to Encounter[2]  Review of  patient's allergies indicates:   Allergen Reactions    Codeine Nausea And Vomiting     Other reaction(s): Nausea    Lexapro [escitalopram oxalate]      Erectile dysfunction.       Objective:     Physical Exam  Vitals and nursing note reviewed.   Constitutional:       Appearance: He is well-developed.   HENT:      Head: Normocephalic and atraumatic.   Eyes:      Conjunctiva/sclera: Conjunctivae normal.      Pupils: Pupils are equal, round, and reactive to light.   Cardiovascular:      Rate and Rhythm: Normal rate and regular rhythm.      Pulses: Intact distal pulses.      Heart sounds: Normal heart sounds.   Pulmonary:      Effort: Pulmonary effort is normal.      Breath sounds: Normal breath sounds.   Abdominal:      General: Bowel sounds are normal.      Palpations: Abdomen is soft.   Musculoskeletal:      Cervical back: Normal range of motion and neck supple.   Skin:     General: Skin is warm and dry.   Neurological:      Mental Status: He is alert and oriented to person, place, and time.         Assessment:     1. History of transient ischemic attack (TIA)    2. Asthma with COPD / Emphysema    3. SOB (shortness of breath)    4. Mixed hyperlipidemia    5. Essential hypertension    6. Aortic calcification    7. Anginal equivalent    8. Other fatigue        Plan:     History of transient ischemic attack (TIA)    Asthma with COPD / Emphysema    SOB (shortness of breath)    Mixed hyperlipidemia    Essential hypertension    Aortic calcification    Anginal equivalent    Other fatigue      Continue losartan-htn  Continue statin-hlp  Asa- Hx of tia   smoking cessation  BP diary       [1]   Social History  Socioeconomic History    Marital status:      Spouse name: Myrtle    Number of children: 2   Occupational History    Occupation: Electronic Tech     Employer: [employer]     Employer:  [employer]   Tobacco Use    Smoking status: Light Smoker     Current packs/day: 0.50     Average packs/day: 1.9  packs/day for 55.3 years (107.1 ttl pk-yrs)     Types: Cigarettes     Start date: 1/1/1970    Smokeless tobacco: Never   Substance and Sexual Activity    Alcohol use: Not Currently     Comment: He is a reformed alcoholic/ 2 beers per week    Drug use: Not Currently     Comment: He used to use marijuana.    Sexual activity: Yes     Partners: Female     Social Drivers of Health     Financial Resource Strain: Patient Declined (4/6/2025)    Overall Financial Resource Strain (CARDIA)     Difficulty of Paying Living Expenses: Patient declined   Food Insecurity: No Food Insecurity (4/6/2025)    Hunger Vital Sign     Worried About Running Out of Food in the Last Year: Never true     Ran Out of Food in the Last Year: Never true   Transportation Needs: No Transportation Needs (4/6/2025)    PRAPARE - Transportation     Lack of Transportation (Medical): No     Lack of Transportation (Non-Medical): No   Physical Activity: Sufficiently Active (4/6/2025)    Exercise Vital Sign     Days of Exercise per Week: 7 days     Minutes of Exercise per Session: 30 min   Stress: Stress Concern Present (4/6/2025)    [employer] - Occupational Stress Questionnaire     Feeling of Stress : To some extent   Housing Stability: Low Risk  (4/6/2025)    Housing Stability Vital Sign     Unable to Pay for Housing in the Last Year: No     Homeless in the Last Year: No   [2]   Current Outpatient Medications on File Prior to Visit   Medication Sig Dispense Refill    albuterol-ipratropium (DUO-NEB) 2.5 mg-0.5 mg/3 mL nebulizer solution USE 1 VIAL IN NEBULIZER  EVERY SIX HOURS IF NEEDED FOR WHEEZING 360 mL 3    amLODIPine (NORVASC) 2.5 MG tablet Take 1 tablet (2.5 mg total) by mouth every morning. 90 tablet 3    atorvastatin (LIPITOR) 20 MG tablet Take 1 tablet (20 mg total) by mouth once daily. 90 tablet 3    multivitamin (ONE DAILY MULTIVITAMIN) per tablet Take 1 tablet by mouth once daily.      RED BEET ORAL Take 1 tablet by  mouth once daily.      umeclidinium-vilanteroL (ANORO ELLIPTA) 62.5-25 mcg/actuation DsDv Inhale 1 puff into the lungs once daily. Controller 180 each 3     No current facility-administered medications on file prior to visit.

## 2025-07-17 ENCOUNTER — HOSPITAL ENCOUNTER (OUTPATIENT)
Dept: RADIOLOGY | Facility: HOSPITAL | Age: 60
Discharge: HOME | End: 2025-07-17
Attending: INTERNAL MEDICINE
Payer: COMMERCIAL

## 2025-07-17 DIAGNOSIS — R91.8 ABNORMAL CT LUNG SCREENING: ICD-10-CM

## 2025-07-17 PROCEDURE — 71250 CT THORAX DX C-: CPT | Mod: TC,PO

## 2025-07-17 PROCEDURE — 71250 CT THORAX DX C-: CPT | Mod: 26,,, | Performed by: RADIOLOGY

## 2025-07-23 ENCOUNTER — LAB VISIT (OUTPATIENT)
Dept: LAB | Facility: HOSPITAL | Age: 60
End: 2025-07-23
Attending: INTERNAL MEDICINE
Payer: COMMERCIAL

## 2025-07-23 DIAGNOSIS — R91.8 ABNORMAL CT LUNG SCREENING: ICD-10-CM

## 2025-07-23 PROCEDURE — 36415 COLL VENOUS BLD VENIPUNCTURE: CPT | Mod: PO

## 2025-07-23 PROCEDURE — 86698 HISTOPLASMA ANTIBODY: CPT

## 2025-07-23 PROCEDURE — 87449 NOS EACH ORGANISM AG IA: CPT

## 2025-07-23 PROCEDURE — 86403 PARTICLE AGGLUT ANTBDY SCRN: CPT

## 2025-07-25 LAB
1,3 BETA GLUCAN SER QL: NEGATIVE
1,3 BETA GLUCAN SER-MCNC: <31 PG/ML
CRYPTOC AG SER QL IA.RAPID: NEGATIVE

## 2025-07-28 LAB
ASPERGILLUS AB TITR SER IA: NOT DETECTED {TITER}
B DERMAT AB SER QL ID: NOT DETECTED
COCCIDIOIDES AB SER QL ID: NOT DETECTED
H CAPSUL AB SER QL ID: NOT DETECTED